# Patient Record
Sex: FEMALE | Race: WHITE | NOT HISPANIC OR LATINO | Employment: OTHER | URBAN - METROPOLITAN AREA
[De-identification: names, ages, dates, MRNs, and addresses within clinical notes are randomized per-mention and may not be internally consistent; named-entity substitution may affect disease eponyms.]

---

## 2023-01-01 ENCOUNTER — PATIENT OUTREACH (OUTPATIENT)
Dept: PRIMARY CARE | Facility: CLINIC | Age: 88
End: 2023-01-01
Payer: MEDICARE

## 2023-01-01 ENCOUNTER — HOSPITAL ENCOUNTER (EMERGENCY)
Facility: HOSPITAL | Age: 88
Discharge: ANOTHER HOSPITAL | End: 2023-11-30
Attending: EMERGENCY MEDICINE
Payer: MEDICARE

## 2023-01-01 ENCOUNTER — HOME VISIT (OUTPATIENT)
Dept: POST ACUTE CARE | Facility: EXTERNAL LOCATION | Age: 88
End: 2023-01-01

## 2023-01-01 ENCOUNTER — APPOINTMENT (OUTPATIENT)
Dept: SURGERY | Facility: CLINIC | Age: 88
End: 2023-01-01
Payer: MEDICARE

## 2023-01-01 ENCOUNTER — CLINICAL SUPPORT (OUTPATIENT)
Dept: SURGERY | Facility: CLINIC | Age: 88
End: 2023-01-01
Payer: MEDICARE

## 2023-01-01 ENCOUNTER — LAB REQUISITION (OUTPATIENT)
Dept: LAB | Facility: HOSPITAL | Age: 88
End: 2023-01-01
Payer: MEDICARE

## 2023-01-01 ENCOUNTER — APPOINTMENT (OUTPATIENT)
Dept: RADIOLOGY | Facility: HOSPITAL | Age: 88
End: 2023-01-01
Payer: MEDICARE

## 2023-01-01 ENCOUNTER — APPOINTMENT (OUTPATIENT)
Dept: RADIOLOGY | Facility: HOSPITAL | Age: 88
DRG: 392 | End: 2023-01-01
Payer: MEDICARE

## 2023-01-01 ENCOUNTER — DOCUMENTATION (OUTPATIENT)
Dept: PRIMARY CARE | Facility: CLINIC | Age: 88
End: 2023-01-01
Payer: MEDICARE

## 2023-01-01 ENCOUNTER — TELEPHONE (OUTPATIENT)
Dept: PRIMARY CARE | Facility: CLINIC | Age: 88
End: 2023-01-01
Payer: MEDICARE

## 2023-01-01 ENCOUNTER — NURSING HOME VISIT (OUTPATIENT)
Dept: POST ACUTE CARE | Facility: EXTERNAL LOCATION | Age: 88
End: 2023-01-01
Payer: MEDICARE

## 2023-01-01 ENCOUNTER — HOME VISIT (OUTPATIENT)
Dept: POST ACUTE CARE | Facility: EXTERNAL LOCATION | Age: 88
End: 2023-01-01
Payer: MEDICARE

## 2023-01-01 ENCOUNTER — HOSPITAL ENCOUNTER (INPATIENT)
Facility: HOSPITAL | Age: 88
LOS: 8 days | Discharge: NURSING, REHAB OR LONG-TERM CARE | DRG: 392 | End: 2023-12-08
Attending: EMERGENCY MEDICINE | Admitting: SURGERY
Payer: MEDICARE

## 2023-01-01 ENCOUNTER — HOME VISIT (OUTPATIENT)
Dept: PRIMARY CARE | Facility: CLINIC | Age: 88
End: 2023-01-01
Payer: MEDICARE

## 2023-01-01 ENCOUNTER — OFFICE VISIT (OUTPATIENT)
Dept: PRIMARY CARE | Facility: CLINIC | Age: 88
End: 2023-01-01
Payer: MEDICARE

## 2023-01-01 VITALS
WEIGHT: 110.1 LBS | HEART RATE: 73 BPM | BODY MASS INDEX: 26.06 KG/M2 | RESPIRATION RATE: 18 BRPM | DIASTOLIC BLOOD PRESSURE: 68 MMHG | SYSTOLIC BLOOD PRESSURE: 146 MMHG

## 2023-01-01 VITALS
SYSTOLIC BLOOD PRESSURE: 130 MMHG | HEART RATE: 59 BPM | TEMPERATURE: 97.2 F | OXYGEN SATURATION: 99 % | DIASTOLIC BLOOD PRESSURE: 47 MMHG | RESPIRATION RATE: 18 BRPM | HEIGHT: 60 IN | WEIGHT: 119.05 LBS | BODY MASS INDEX: 23.37 KG/M2

## 2023-01-01 VITALS
HEIGHT: 59 IN | DIASTOLIC BLOOD PRESSURE: 63 MMHG | OXYGEN SATURATION: 93 % | WEIGHT: 119.05 LBS | SYSTOLIC BLOOD PRESSURE: 126 MMHG | HEART RATE: 86 BPM | TEMPERATURE: 101.1 F | BODY MASS INDEX: 24 KG/M2 | RESPIRATION RATE: 14 BRPM

## 2023-01-01 VITALS
OXYGEN SATURATION: 97 % | TEMPERATURE: 98.3 F | SYSTOLIC BLOOD PRESSURE: 147 MMHG | DIASTOLIC BLOOD PRESSURE: 73 MMHG | HEART RATE: 71 BPM

## 2023-01-01 VITALS
HEIGHT: 55 IN | DIASTOLIC BLOOD PRESSURE: 50 MMHG | OXYGEN SATURATION: 96 % | WEIGHT: 111.6 LBS | SYSTOLIC BLOOD PRESSURE: 130 MMHG | BODY MASS INDEX: 25.83 KG/M2 | HEART RATE: 82 BPM

## 2023-01-01 VITALS
HEART RATE: 73 BPM | SYSTOLIC BLOOD PRESSURE: 132 MMHG | DIASTOLIC BLOOD PRESSURE: 65 MMHG | WEIGHT: 109.4 LBS | BODY MASS INDEX: 25.9 KG/M2 | RESPIRATION RATE: 18 BRPM

## 2023-01-01 VITALS
TEMPERATURE: 98 F | OXYGEN SATURATION: 90 % | SYSTOLIC BLOOD PRESSURE: 119 MMHG | DIASTOLIC BLOOD PRESSURE: 51 MMHG | HEART RATE: 63 BPM | RESPIRATION RATE: 18 BRPM

## 2023-01-01 VITALS — RESPIRATION RATE: 18 BRPM | DIASTOLIC BLOOD PRESSURE: 60 MMHG | HEART RATE: 75 BPM | SYSTOLIC BLOOD PRESSURE: 120 MMHG

## 2023-01-01 VITALS — DIASTOLIC BLOOD PRESSURE: 58 MMHG | SYSTOLIC BLOOD PRESSURE: 114 MMHG | HEART RATE: 74 BPM | RESPIRATION RATE: 18 BRPM

## 2023-01-01 DIAGNOSIS — I25.10 ASCVD (ARTERIOSCLEROTIC CARDIOVASCULAR DISEASE): Chronic | ICD-10-CM

## 2023-01-01 DIAGNOSIS — I10 ESSENTIAL (PRIMARY) HYPERTENSION: ICD-10-CM

## 2023-01-01 DIAGNOSIS — I61.9 NONTRAUMATIC INTRACEREBRAL HEMORRHAGE, UNSPECIFIED CEREBRAL LOCATION, UNSPECIFIED LATERALITY (MULTI): ICD-10-CM

## 2023-01-01 DIAGNOSIS — I61.5 IVH (INTRAVENTRICULAR HEMORRHAGE) (MULTI): ICD-10-CM

## 2023-01-01 DIAGNOSIS — E78.00 HYPERCHOLESTEROLEMIA: ICD-10-CM

## 2023-01-01 DIAGNOSIS — Z71.9 HEALTH EDUCATION/COUNSELING: ICD-10-CM

## 2023-01-01 DIAGNOSIS — F32.1 MODERATE MAJOR DEPRESSION (MULTI): ICD-10-CM

## 2023-01-01 DIAGNOSIS — Z48.03 CHANGE OR REMOVAL OF DRAINS: Primary | ICD-10-CM

## 2023-01-01 DIAGNOSIS — Z00.00 MEDICARE ANNUAL WELLNESS VISIT, SUBSEQUENT: ICD-10-CM

## 2023-01-01 DIAGNOSIS — M06.9 RHEUMATOID ARTHRITIS INVOLVING BOTH KNEES, UNSPECIFIED WHETHER RHEUMATOID FACTOR PRESENT (MULTI): ICD-10-CM

## 2023-01-01 DIAGNOSIS — E78.2 COMBINED HYPERLIPIDEMIA: Chronic | ICD-10-CM

## 2023-01-01 DIAGNOSIS — E03.9 HYPOTHYROIDISM, UNSPECIFIED: ICD-10-CM

## 2023-01-01 DIAGNOSIS — I10 PRIMARY HYPERTENSION: Primary | ICD-10-CM

## 2023-01-01 DIAGNOSIS — M06.9 RHEUMATOID ARTHRITIS INVOLVING BOTH KNEES, UNSPECIFIED WHETHER RHEUMATOID FACTOR PRESENT (MULTI): Primary | ICD-10-CM

## 2023-01-01 DIAGNOSIS — M15.9 PRIMARY OSTEOARTHRITIS INVOLVING MULTIPLE JOINTS: ICD-10-CM

## 2023-01-01 DIAGNOSIS — S32.019A CLOSED FRACTURE OF FIRST LUMBAR VERTEBRA, UNSPECIFIED FRACTURE MORPHOLOGY, INITIAL ENCOUNTER (MULTI): ICD-10-CM

## 2023-01-01 DIAGNOSIS — K57.92 DIVERTICULITIS: ICD-10-CM

## 2023-01-01 DIAGNOSIS — F32.1 DEPRESSION, MAJOR, SINGLE EPISODE, MODERATE (MULTI): ICD-10-CM

## 2023-01-01 DIAGNOSIS — K57.80 DIVERTICULITIS OF INTESTINE WITH ABSCESS WITHOUT BLEEDING, UNSPECIFIED PART OF INTESTINAL TRACT: ICD-10-CM

## 2023-01-01 DIAGNOSIS — K57.92 DIVERTICULITIS: Primary | ICD-10-CM

## 2023-01-01 DIAGNOSIS — I10 ESSENTIAL (PRIMARY) HYPERTENSION: Primary | Chronic | ICD-10-CM

## 2023-01-01 DIAGNOSIS — N39.0 URINARY TRACT INFECTION, SITE NOT SPECIFIED: ICD-10-CM

## 2023-01-01 DIAGNOSIS — K65.1 INTRA-ABDOMINAL ABSCESS (MULTI): ICD-10-CM

## 2023-01-01 DIAGNOSIS — S32.019A CLOSED FRACTURE OF FIRST LUMBAR VERTEBRA, UNSPECIFIED FRACTURE MORPHOLOGY, INITIAL ENCOUNTER (MULTI): Primary | ICD-10-CM

## 2023-01-01 DIAGNOSIS — E78.5 HYPERLIPIDEMIA, UNSPECIFIED: ICD-10-CM

## 2023-01-01 DIAGNOSIS — S62.101A WRIST FRACTURE, CLOSED, RIGHT, INITIAL ENCOUNTER: ICD-10-CM

## 2023-01-01 LAB
25(OH)D3 SERPL-MCNC: 46 NG/ML (ref 30–100)
ABO GROUP (TYPE) IN BLOOD: NORMAL
ALBUMIN SERPL BCP-MCNC: 2.5 G/DL (ref 3.4–5)
ALBUMIN SERPL BCP-MCNC: 2.8 G/DL (ref 3.4–5)
ALBUMIN SERPL BCP-MCNC: 2.8 G/DL (ref 3.4–5)
ALBUMIN SERPL BCP-MCNC: 3 G/DL (ref 3.4–5)
ALBUMIN SERPL BCP-MCNC: 3.4 G/DL (ref 3.4–5)
ALBUMIN SERPL BCP-MCNC: 3.5 G/DL (ref 3.4–5)
ALBUMIN SERPL BCP-MCNC: 4.1 G/DL (ref 3.4–5)
ALP SERPL-CCNC: 116 U/L (ref 33–136)
ALP SERPL-CCNC: 133 U/L (ref 33–136)
ALP SERPL-CCNC: 141 U/L (ref 33–136)
ALT SERPL W P-5'-P-CCNC: 18 U/L (ref 7–45)
ALT SERPL W P-5'-P-CCNC: 19 U/L (ref 7–45)
ALT SERPL W P-5'-P-CCNC: 20 U/L (ref 7–45)
ANION GAP SERPL CALC-SCNC: 13 MMOL/L (ref 10–20)
ANION GAP SERPL CALC-SCNC: 13 MMOL/L (ref 10–20)
ANION GAP SERPL CALC-SCNC: 15 MMOL/L (ref 10–20)
ANION GAP SERPL CALC-SCNC: 16 MMOL/L (ref 10–20)
ANION GAP SERPL CALC-SCNC: 16 MMOL/L (ref 10–20)
ANTIBODY SCREEN: NORMAL
APPEARANCE UR: ABNORMAL
APPEARANCE UR: CLEAR
APTT PPP: 30 SECONDS (ref 27–38)
AST SERPL W P-5'-P-CCNC: 16 U/L (ref 9–39)
AST SERPL W P-5'-P-CCNC: 18 U/L (ref 9–39)
AST SERPL W P-5'-P-CCNC: 20 U/L (ref 9–39)
BACTERIA #/AREA URNS AUTO: ABNORMAL /HPF
BACTERIA #/AREA URNS AUTO: ABNORMAL /HPF
BACTERIA BLD CULT: NORMAL
BACTERIA BLD CULT: NORMAL
BACTERIA FLD CULT: ABNORMAL
BACTERIA UR CULT: NO GROWTH
BACTERIA UR CULT: NORMAL
BASOPHILS # BLD AUTO: 0.03 X10*3/UL (ref 0–0.1)
BASOPHILS # BLD AUTO: 0.03 X10*3/UL (ref 0–0.1)
BASOPHILS # BLD AUTO: 0.04 X10*3/UL (ref 0–0.1)
BASOPHILS NFR BLD AUTO: 0.2 %
BASOPHILS NFR BLD AUTO: 0.3 %
BASOPHILS NFR BLD AUTO: 0.3 %
BILIRUB SERPL-MCNC: 0.3 MG/DL (ref 0–1.2)
BILIRUB SERPL-MCNC: 0.4 MG/DL (ref 0–1.2)
BILIRUB SERPL-MCNC: 0.5 MG/DL (ref 0–1.2)
BILIRUB UR STRIP.AUTO-MCNC: NEGATIVE MG/DL
BILIRUB UR STRIP.AUTO-MCNC: NEGATIVE MG/DL
BUN SERPL-MCNC: 12 MG/DL (ref 6–23)
BUN SERPL-MCNC: 14 MG/DL (ref 6–23)
BUN SERPL-MCNC: 21 MG/DL (ref 6–23)
BUN SERPL-MCNC: 21 MG/DL (ref 6–23)
BUN SERPL-MCNC: 7 MG/DL (ref 6–23)
BUN SERPL-MCNC: 8 MG/DL (ref 6–23)
BUN SERPL-MCNC: 9 MG/DL (ref 6–23)
CALCIUM SERPL-MCNC: 7.7 MG/DL (ref 8.6–10.6)
CALCIUM SERPL-MCNC: 8.2 MG/DL (ref 8.6–10.6)
CALCIUM SERPL-MCNC: 8.6 MG/DL (ref 8.6–10.3)
CALCIUM SERPL-MCNC: 8.6 MG/DL (ref 8.6–10.6)
CALCIUM SERPL-MCNC: 8.7 MG/DL (ref 8.6–10.3)
CALCIUM SERPL-MCNC: 8.8 MG/DL (ref 8.6–10.6)
CALCIUM SERPL-MCNC: 9.2 MG/DL (ref 8.6–10.3)
CHLORIDE SERPL-SCNC: 94 MMOL/L (ref 98–107)
CHLORIDE SERPL-SCNC: 96 MMOL/L (ref 98–107)
CHLORIDE SERPL-SCNC: 97 MMOL/L (ref 98–107)
CHLORIDE SERPL-SCNC: 99 MMOL/L (ref 98–107)
CHOLEST SERPL-MCNC: 265 MG/DL (ref 0–199)
CHOLESTEROL/HDL RATIO: 5.2
CK SERPL-CCNC: 104 U/L (ref 0–215)
CO2 SERPL-SCNC: 28 MMOL/L (ref 21–32)
CO2 SERPL-SCNC: 29 MMOL/L (ref 21–32)
CO2 SERPL-SCNC: 30 MMOL/L (ref 21–32)
CO2 SERPL-SCNC: 32 MMOL/L (ref 21–32)
CO2 SERPL-SCNC: 32 MMOL/L (ref 21–32)
COLOR UR: YELLOW
COLOR UR: YELLOW
CREAT SERPL-MCNC: 0.72 MG/DL (ref 0.5–1.05)
CREAT SERPL-MCNC: 0.72 MG/DL (ref 0.5–1.05)
CREAT SERPL-MCNC: 0.74 MG/DL (ref 0.5–1.05)
CREAT SERPL-MCNC: 0.79 MG/DL (ref 0.5–1.05)
CREAT SERPL-MCNC: 0.79 MG/DL (ref 0.5–1.05)
CREAT SERPL-MCNC: 0.86 MG/DL (ref 0.5–1.05)
CREAT SERPL-MCNC: 0.99 MG/DL (ref 0.5–1.05)
EOSINOPHIL # BLD AUTO: 0.02 X10*3/UL (ref 0–0.4)
EOSINOPHIL # BLD AUTO: 0.04 X10*3/UL (ref 0–0.4)
EOSINOPHIL # BLD AUTO: 0.06 X10*3/UL (ref 0–0.4)
EOSINOPHIL NFR BLD AUTO: 0.2 %
EOSINOPHIL NFR BLD AUTO: 0.3 %
EOSINOPHIL NFR BLD AUTO: 0.7 %
ERYTHROCYTE [DISTWIDTH] IN BLOOD BY AUTOMATED COUNT: 14.1 % (ref 11.5–14.5)
ERYTHROCYTE [DISTWIDTH] IN BLOOD BY AUTOMATED COUNT: 14.2 % (ref 11.5–14.5)
ERYTHROCYTE [DISTWIDTH] IN BLOOD BY AUTOMATED COUNT: 14.3 % (ref 11.5–14.5)
ERYTHROCYTE [DISTWIDTH] IN BLOOD BY AUTOMATED COUNT: 14.3 % (ref 11.5–14.5)
ERYTHROCYTE [DISTWIDTH] IN BLOOD BY AUTOMATED COUNT: 14.5 % (ref 11.5–14.5)
ERYTHROCYTE [DISTWIDTH] IN BLOOD BY AUTOMATED COUNT: 15 % (ref 11.5–14.5)
FLUAV RNA RESP QL NAA+PROBE: NOT DETECTED
FLUBV RNA RESP QL NAA+PROBE: NOT DETECTED
GFR SERPL CREATININE-BSD FRML MDRD: 54 ML/MIN/1.73M*2
GFR SERPL CREATININE-BSD FRML MDRD: 64 ML/MIN/1.73M*2
GFR SERPL CREATININE-BSD FRML MDRD: 71 ML/MIN/1.73M*2
GFR SERPL CREATININE-BSD FRML MDRD: 71 ML/MIN/1.73M*2
GFR SERPL CREATININE-BSD FRML MDRD: 77 ML/MIN/1.73M*2
GFR SERPL CREATININE-BSD FRML MDRD: 80 ML/MIN/1.73M*2
GFR SERPL CREATININE-BSD FRML MDRD: 80 ML/MIN/1.73M*2
GLUCOSE SERPL-MCNC: 108 MG/DL (ref 74–99)
GLUCOSE SERPL-MCNC: 116 MG/DL (ref 74–99)
GLUCOSE SERPL-MCNC: 78 MG/DL (ref 74–99)
GLUCOSE SERPL-MCNC: 85 MG/DL (ref 74–99)
GLUCOSE SERPL-MCNC: 88 MG/DL (ref 74–99)
GLUCOSE SERPL-MCNC: 89 MG/DL (ref 74–99)
GLUCOSE SERPL-MCNC: 99 MG/DL (ref 74–99)
GLUCOSE UR STRIP.AUTO-MCNC: NEGATIVE MG/DL
GLUCOSE UR STRIP.AUTO-MCNC: NEGATIVE MG/DL
GRAM STN SPEC: ABNORMAL
GRAM STN SPEC: ABNORMAL
HCT VFR BLD AUTO: 26.2 % (ref 36–46)
HCT VFR BLD AUTO: 29.8 % (ref 36–46)
HCT VFR BLD AUTO: 29.9 % (ref 36–46)
HCT VFR BLD AUTO: 30.3 % (ref 36–46)
HCT VFR BLD AUTO: 31.6 % (ref 36–46)
HCT VFR BLD AUTO: 31.9 % (ref 36–46)
HCT VFR BLD AUTO: 34.7 % (ref 36–46)
HCT VFR BLD AUTO: 36.8 % (ref 36–46)
HDLC SERPL-MCNC: 50.8 MG/DL
HGB BLD-MCNC: 10.3 G/DL (ref 12–16)
HGB BLD-MCNC: 11.2 G/DL (ref 12–16)
HGB BLD-MCNC: 8 G/DL (ref 12–16)
HGB BLD-MCNC: 9 G/DL (ref 12–16)
HGB BLD-MCNC: 9.2 G/DL (ref 12–16)
HGB BLD-MCNC: 9.4 G/DL (ref 12–16)
HGB BLD-MCNC: 9.6 G/DL (ref 12–16)
HGB BLD-MCNC: 9.9 G/DL (ref 12–16)
HOLD SPECIMEN: NORMAL
HYALINE CASTS #/AREA URNS AUTO: ABNORMAL /LPF
IMM GRANULOCYTES # BLD AUTO: 0.05 X10*3/UL (ref 0–0.5)
IMM GRANULOCYTES # BLD AUTO: 0.07 X10*3/UL (ref 0–0.5)
IMM GRANULOCYTES # BLD AUTO: 0.11 X10*3/UL (ref 0–0.5)
IMM GRANULOCYTES NFR BLD AUTO: 0.6 % (ref 0–0.9)
IMM GRANULOCYTES NFR BLD AUTO: 0.6 % (ref 0–0.9)
IMM GRANULOCYTES NFR BLD AUTO: 0.9 % (ref 0–0.9)
INR PPP: 1.2 (ref 0.9–1.1)
KETONES UR STRIP.AUTO-MCNC: ABNORMAL MG/DL
KETONES UR STRIP.AUTO-MCNC: NEGATIVE MG/DL
LEUKOCYTE ESTERASE UR QL STRIP.AUTO: ABNORMAL
LEUKOCYTE ESTERASE UR QL STRIP.AUTO: ABNORMAL
LYMPHOCYTES # BLD AUTO: 1.14 X10*3/UL (ref 0.8–3)
LYMPHOCYTES # BLD AUTO: 1.24 X10*3/UL (ref 0.8–3)
LYMPHOCYTES # BLD AUTO: 1.4 X10*3/UL (ref 0.8–3)
LYMPHOCYTES NFR BLD AUTO: 10 %
LYMPHOCYTES NFR BLD AUTO: 16.3 %
LYMPHOCYTES NFR BLD AUTO: 9.2 %
MAGNESIUM SERPL-MCNC: 1.75 MG/DL (ref 1.6–2.4)
MAGNESIUM SERPL-MCNC: 1.86 MG/DL (ref 1.6–2.4)
MAGNESIUM SERPL-MCNC: 2.09 MG/DL (ref 1.6–2.4)
MCH RBC QN AUTO: 24.8 PG (ref 26–34)
MCH RBC QN AUTO: 24.9 PG (ref 26–34)
MCH RBC QN AUTO: 25 PG (ref 26–34)
MCH RBC QN AUTO: 25 PG (ref 26–34)
MCH RBC QN AUTO: 25.1 PG (ref 26–34)
MCH RBC QN AUTO: 25.3 PG (ref 26–34)
MCH RBC QN AUTO: 25.4 PG (ref 26–34)
MCH RBC QN AUTO: 25.7 PG (ref 26–34)
MCHC RBC AUTO-ENTMCNC: 29.7 G/DL (ref 32–36)
MCHC RBC AUTO-ENTMCNC: 30.1 G/DL (ref 32–36)
MCHC RBC AUTO-ENTMCNC: 30.2 G/DL (ref 32–36)
MCHC RBC AUTO-ENTMCNC: 30.4 G/DL (ref 32–36)
MCHC RBC AUTO-ENTMCNC: 30.5 G/DL (ref 32–36)
MCHC RBC AUTO-ENTMCNC: 30.8 G/DL (ref 32–36)
MCHC RBC AUTO-ENTMCNC: 31 G/DL (ref 32–36)
MCHC RBC AUTO-ENTMCNC: 31.3 G/DL (ref 32–36)
MCV RBC AUTO: 80 FL (ref 80–100)
MCV RBC AUTO: 82 FL (ref 80–100)
MCV RBC AUTO: 83 FL (ref 80–100)
MCV RBC AUTO: 83 FL (ref 80–100)
MCV RBC AUTO: 84 FL (ref 80–100)
MCV RBC AUTO: 84 FL (ref 80–100)
MONOCYTES # BLD AUTO: 0.83 X10*3/UL (ref 0.05–0.8)
MONOCYTES # BLD AUTO: 1.17 X10*3/UL (ref 0.05–0.8)
MONOCYTES # BLD AUTO: 1.46 X10*3/UL (ref 0.05–0.8)
MONOCYTES NFR BLD AUTO: 11.7 %
MONOCYTES NFR BLD AUTO: 9.4 %
MONOCYTES NFR BLD AUTO: 9.7 %
NEUTROPHILS # BLD AUTO: 6.21 X10*3/UL (ref 1.6–5.5)
NEUTROPHILS # BLD AUTO: 9.55 X10*3/UL (ref 1.6–5.5)
NEUTROPHILS # BLD AUTO: 9.99 X10*3/UL (ref 1.6–5.5)
NEUTROPHILS NFR BLD AUTO: 72.4 %
NEUTROPHILS NFR BLD AUTO: 76.8 %
NEUTROPHILS NFR BLD AUTO: 80.4 %
NITRITE UR QL STRIP.AUTO: NEGATIVE
NITRITE UR QL STRIP.AUTO: NEGATIVE
NON-HDL CHOLESTEROL: 214 MG/DL (ref 0–149)
NRBC BLD-RTO: 0 /100 WBCS (ref 0–0)
PH UR STRIP.AUTO: 5 [PH]
PH UR STRIP.AUTO: 6 [PH]
PHOSPHATE SERPL-MCNC: 2.6 MG/DL (ref 2.5–4.9)
PHOSPHATE SERPL-MCNC: 3 MG/DL (ref 2.5–4.9)
PHOSPHATE SERPL-MCNC: 3.6 MG/DL (ref 2.5–4.9)
PHOSPHATE SERPL-MCNC: 4.1 MG/DL (ref 2.5–4.9)
PLATELET # BLD AUTO: 238 X10*3/UL (ref 150–450)
PLATELET # BLD AUTO: 243 X10*3/UL (ref 150–450)
PLATELET # BLD AUTO: 245 X10*3/UL (ref 150–450)
PLATELET # BLD AUTO: 279 X10*3/UL (ref 150–450)
PLATELET # BLD AUTO: 288 X10*3/UL (ref 150–450)
PLATELET # BLD AUTO: 293 X10*3/UL (ref 150–450)
PLATELET # BLD AUTO: 302 X10*3/UL (ref 150–450)
PLATELET # BLD AUTO: 302 X10*3/UL (ref 150–450)
POTASSIUM SERPL-SCNC: 3.6 MMOL/L (ref 3.5–5.3)
POTASSIUM SERPL-SCNC: 3.6 MMOL/L (ref 3.5–5.3)
POTASSIUM SERPL-SCNC: 3.7 MMOL/L (ref 3.5–5.3)
POTASSIUM SERPL-SCNC: 3.7 MMOL/L (ref 3.5–5.3)
POTASSIUM SERPL-SCNC: 3.8 MMOL/L (ref 3.5–5.3)
POTASSIUM SERPL-SCNC: 4.3 MMOL/L (ref 3.5–5.3)
POTASSIUM SERPL-SCNC: 4.7 MMOL/L (ref 3.5–5.3)
PROT SERPL-MCNC: 6.7 G/DL (ref 6.4–8.2)
PROT SERPL-MCNC: 7.2 G/DL (ref 6.4–8.2)
PROT SERPL-MCNC: 7.2 G/DL (ref 6.4–8.2)
PROT UR STRIP.AUTO-MCNC: ABNORMAL MG/DL
PROT UR STRIP.AUTO-MCNC: NEGATIVE MG/DL
PROTHROMBIN TIME: 13.5 SECONDS (ref 9.8–12.8)
RBC # BLD AUTO: 3.2 X10*6/UL (ref 4–5.2)
RBC # BLD AUTO: 3.6 X10*6/UL (ref 4–5.2)
RBC # BLD AUTO: 3.64 X10*6/UL (ref 4–5.2)
RBC # BLD AUTO: 3.7 X10*6/UL (ref 4–5.2)
RBC # BLD AUTO: 3.86 X10*6/UL (ref 4–5.2)
RBC # BLD AUTO: 3.94 X10*6/UL (ref 4–5.2)
RBC # BLD AUTO: 4.15 X10*6/UL (ref 4–5.2)
RBC # BLD AUTO: 4.36 X10*6/UL (ref 4–5.2)
RBC # UR STRIP.AUTO: ABNORMAL /UL
RBC # UR STRIP.AUTO: NEGATIVE /UL
RBC #/AREA URNS AUTO: ABNORMAL /HPF
RBC #/AREA URNS AUTO: ABNORMAL /HPF
RH FACTOR (ANTIGEN D): NORMAL
SARS-COV-2 RNA RESP QL NAA+PROBE: NOT DETECTED
SODIUM SERPL-SCNC: 134 MMOL/L (ref 136–145)
SODIUM SERPL-SCNC: 134 MMOL/L (ref 136–145)
SODIUM SERPL-SCNC: 135 MMOL/L (ref 136–145)
SODIUM SERPL-SCNC: 137 MMOL/L (ref 136–145)
SODIUM SERPL-SCNC: 139 MMOL/L (ref 136–145)
SP GR UR STRIP.AUTO: 1.01
SP GR UR STRIP.AUTO: 1.01
TSH SERPL-ACNC: 6.59 MIU/L (ref 0.44–3.98)
UROBILINOGEN UR STRIP.AUTO-MCNC: <2 MG/DL
UROBILINOGEN UR STRIP.AUTO-MCNC: <2 MG/DL
VIT B12 SERPL-MCNC: 1961 PG/ML (ref 211–911)
WBC # BLD AUTO: 12.4 X10*3/UL (ref 4.4–11.3)
WBC # BLD AUTO: 12.4 X10*3/UL (ref 4.4–11.3)
WBC # BLD AUTO: 5 X10*3/UL (ref 4.4–11.3)
WBC # BLD AUTO: 6.6 X10*3/UL (ref 4.4–11.3)
WBC # BLD AUTO: 6.7 X10*3/UL (ref 4.4–11.3)
WBC # BLD AUTO: 8.6 X10*3/UL (ref 4.4–11.3)
WBC # BLD AUTO: 9.2 X10*3/UL (ref 4.4–11.3)
WBC # BLD AUTO: 9.8 X10*3/UL (ref 4.4–11.3)
WBC #/AREA URNS AUTO: ABNORMAL /HPF
WBC #/AREA URNS AUTO: ABNORMAL /HPF
WBC CLUMPS #/AREA URNS AUTO: ABNORMAL /HPF

## 2023-01-01 PROCEDURE — 2500000002 HC RX 250 W HCPCS SELF ADMINISTERED DRUGS (ALT 637 FOR MEDICARE OP, ALT 636 FOR OP/ED)

## 2023-01-01 PROCEDURE — 85027 COMPLETE CBC AUTOMATED: CPT

## 2023-01-01 PROCEDURE — 99152 MOD SED SAME PHYS/QHP 5/>YRS: CPT

## 2023-01-01 PROCEDURE — 80069 RENAL FUNCTION PANEL: CPT | Performed by: PHYSICIAN ASSISTANT

## 2023-01-01 PROCEDURE — 49406 IMAGE CATH FLUID PERI/RETRO: CPT

## 2023-01-01 PROCEDURE — 71260 CT THORAX DX C+: CPT | Mod: FR

## 2023-01-01 PROCEDURE — 87086 URINE CULTURE/COLONY COUNT: CPT | Mod: OUT,GEALAB | Performed by: FAMILY MEDICINE

## 2023-01-01 PROCEDURE — 99349 HOME/RES VST EST MOD MDM 40: CPT | Performed by: FAMILY MEDICINE

## 2023-01-01 PROCEDURE — 1200000002 HC GENERAL ROOM WITH TELEMETRY DAILY

## 2023-01-01 PROCEDURE — 85025 COMPLETE CBC W/AUTO DIFF WBC: CPT

## 2023-01-01 PROCEDURE — 2500000004 HC RX 250 GENERAL PHARMACY W/ HCPCS (ALT 636 FOR OP/ED)

## 2023-01-01 PROCEDURE — 36415 COLL VENOUS BLD VENIPUNCTURE: CPT

## 2023-01-01 PROCEDURE — 82306 VITAMIN D 25 HYDROXY: CPT

## 2023-01-01 PROCEDURE — 80069 RENAL FUNCTION PANEL: CPT

## 2023-01-01 PROCEDURE — 85027 COMPLETE CBC AUTOMATED: CPT | Mod: OUT | Performed by: FAMILY MEDICINE

## 2023-01-01 PROCEDURE — 99285 EMERGENCY DEPT VISIT HI MDM: CPT | Performed by: EMERGENCY MEDICINE

## 2023-01-01 PROCEDURE — 2500000005 HC RX 250 GENERAL PHARMACY W/O HCPCS

## 2023-01-01 PROCEDURE — 83735 ASSAY OF MAGNESIUM: CPT | Performed by: PHYSICIAN ASSISTANT

## 2023-01-01 PROCEDURE — 82607 VITAMIN B-12: CPT

## 2023-01-01 PROCEDURE — 87636 SARSCOV2 & INF A&B AMP PRB: CPT

## 2023-01-01 PROCEDURE — 87181 SC STD AGAR DILUTION PER AGT: CPT | Performed by: STUDENT IN AN ORGANIZED HEALTH CARE EDUCATION/TRAINING PROGRAM

## 2023-01-01 PROCEDURE — 72128 CT CHEST SPINE W/O DYE: CPT | Mod: RCN,FR

## 2023-01-01 PROCEDURE — 2500000004 HC RX 250 GENERAL PHARMACY W/ HCPCS (ALT 636 FOR OP/ED): Performed by: STUDENT IN AN ORGANIZED HEALTH CARE EDUCATION/TRAINING PROGRAM

## 2023-01-01 PROCEDURE — 99309 SBSQ NF CARE MODERATE MDM 30: CPT | Performed by: FAMILY MEDICINE

## 2023-01-01 PROCEDURE — 3078F DIAST BP <80 MM HG: CPT | Performed by: FAMILY MEDICINE

## 2023-01-01 PROCEDURE — 2500000001 HC RX 250 WO HCPCS SELF ADMINISTERED DRUGS (ALT 637 FOR MEDICARE OP)

## 2023-01-01 PROCEDURE — 72125 CT NECK SPINE W/O DYE: CPT | Performed by: RADIOLOGY

## 2023-01-01 PROCEDURE — 97530 THERAPEUTIC ACTIVITIES: CPT | Mod: GP

## 2023-01-01 PROCEDURE — 85025 COMPLETE CBC W/AUTO DIFF WBC: CPT | Mod: OUT | Performed by: FAMILY MEDICINE

## 2023-01-01 PROCEDURE — 83735 ASSAY OF MAGNESIUM: CPT

## 2023-01-01 PROCEDURE — 97530 THERAPEUTIC ACTIVITIES: CPT | Mod: GO

## 2023-01-01 PROCEDURE — 80053 COMPREHEN METABOLIC PANEL: CPT

## 2023-01-01 PROCEDURE — 82310 ASSAY OF CALCIUM: CPT

## 2023-01-01 PROCEDURE — 87086 URINE CULTURE/COLONY COUNT: CPT | Mod: GEALAB

## 2023-01-01 PROCEDURE — 72100 X-RAY EXAM L-S SPINE 2/3 VWS: CPT | Mod: FR

## 2023-01-01 PROCEDURE — 96361 HYDRATE IV INFUSION ADD-ON: CPT

## 2023-01-01 PROCEDURE — 86850 RBC ANTIBODY SCREEN: CPT

## 2023-01-01 PROCEDURE — 72131 CT LUMBAR SPINE W/O DYE: CPT | Mod: RCN | Performed by: RADIOLOGY

## 2023-01-01 PROCEDURE — 99232 SBSQ HOSP IP/OBS MODERATE 35: CPT

## 2023-01-01 PROCEDURE — 96365 THER/PROPH/DIAG IV INF INIT: CPT | Mod: 59

## 2023-01-01 PROCEDURE — 96365 THER/PROPH/DIAG IV INF INIT: CPT

## 2023-01-01 PROCEDURE — 96372 THER/PROPH/DIAG INJ SC/IM: CPT

## 2023-01-01 PROCEDURE — G0439 PPPS, SUBSEQ VISIT: HCPCS | Performed by: FAMILY MEDICINE

## 2023-01-01 PROCEDURE — 97165 OT EVAL LOW COMPLEX 30 MIN: CPT | Mod: GO

## 2023-01-01 PROCEDURE — 72100 X-RAY EXAM L-S SPINE 2/3 VWS: CPT | Mod: FOREIGN READ | Performed by: RADIOLOGY

## 2023-01-01 PROCEDURE — 70450 CT HEAD/BRAIN W/O DYE: CPT

## 2023-01-01 PROCEDURE — 74177 CT ABD & PELVIS W/CONTRAST: CPT | Mod: FOREIGN READ | Performed by: RADIOLOGY

## 2023-01-01 PROCEDURE — 84443 ASSAY THYROID STIM HORMONE: CPT | Mod: OUT | Performed by: FAMILY MEDICINE

## 2023-01-01 PROCEDURE — 97162 PT EVAL MOD COMPLEX 30 MIN: CPT | Mod: GP | Performed by: PHYSICAL THERAPIST

## 2023-01-01 PROCEDURE — 86901 BLOOD TYPING SEROLOGIC RH(D): CPT

## 2023-01-01 PROCEDURE — 97535 SELF CARE MNGMENT TRAINING: CPT | Mod: GO

## 2023-01-01 PROCEDURE — 70450 CT HEAD/BRAIN W/O DYE: CPT | Performed by: RADIOLOGY

## 2023-01-01 PROCEDURE — 72128 CT CHEST SPINE W/O DYE: CPT | Mod: RCN | Performed by: RADIOLOGY

## 2023-01-01 PROCEDURE — 71260 CT THORAX DX C+: CPT | Mod: FOREIGN READ | Performed by: RADIOLOGY

## 2023-01-01 PROCEDURE — 81001 URINALYSIS AUTO W/SCOPE: CPT

## 2023-01-01 PROCEDURE — 99231 SBSQ HOSP IP/OBS SF/LOW 25: CPT

## 2023-01-01 PROCEDURE — 2500000004 HC RX 250 GENERAL PHARMACY W/ HCPCS (ALT 636 FOR OP/ED): Performed by: RADIOLOGY

## 2023-01-01 PROCEDURE — 99152 MOD SED SAME PHYS/QHP 5/>YRS: CPT | Performed by: RADIOLOGY

## 2023-01-01 PROCEDURE — 82550 ASSAY OF CK (CPK): CPT

## 2023-01-01 PROCEDURE — 1036F TOBACCO NON-USER: CPT | Performed by: FAMILY MEDICINE

## 2023-01-01 PROCEDURE — 99232 SBSQ HOSP IP/OBS MODERATE 35: CPT | Performed by: PHYSICIAN ASSISTANT

## 2023-01-01 PROCEDURE — 97116 GAIT TRAINING THERAPY: CPT | Mod: GP

## 2023-01-01 PROCEDURE — 1157F ADVNC CARE PLAN IN RCRD: CPT | Performed by: FAMILY MEDICINE

## 2023-01-01 PROCEDURE — 72131 CT LUMBAR SPINE W/O DYE: CPT | Mod: RCN

## 2023-01-01 PROCEDURE — 99222 1ST HOSP IP/OBS MODERATE 55: CPT

## 2023-01-01 PROCEDURE — C1769 GUIDE WIRE: HCPCS

## 2023-01-01 PROCEDURE — 85027 COMPLETE CBC AUTOMATED: CPT | Performed by: PHYSICIAN ASSISTANT

## 2023-01-01 PROCEDURE — 81001 URINALYSIS AUTO W/SCOPE: CPT | Mod: OUT | Performed by: FAMILY MEDICINE

## 2023-01-01 PROCEDURE — C1729 CATH, DRAINAGE: HCPCS

## 2023-01-01 PROCEDURE — 80051 ELECTROLYTE PANEL: CPT

## 2023-01-01 PROCEDURE — 97110 THERAPEUTIC EXERCISES: CPT | Mod: GP

## 2023-01-01 PROCEDURE — 2720000007 HC OR 272 NO HCPCS

## 2023-01-01 PROCEDURE — 49406 IMAGE CATH FLUID PERI/RETRO: CPT | Performed by: RADIOLOGY

## 2023-01-01 PROCEDURE — 99233 SBSQ HOSP IP/OBS HIGH 50: CPT

## 2023-01-01 PROCEDURE — 3075F SYST BP GE 130 - 139MM HG: CPT | Performed by: FAMILY MEDICINE

## 2023-01-01 PROCEDURE — 2550000001 HC RX 255 CONTRASTS

## 2023-01-01 PROCEDURE — 99221 1ST HOSP IP/OBS SF/LOW 40: CPT

## 2023-01-01 PROCEDURE — 83718 ASSAY OF LIPOPROTEIN: CPT | Mod: OUT | Performed by: FAMILY MEDICINE

## 2023-01-01 PROCEDURE — 85730 THROMBOPLASTIN TIME PARTIAL: CPT

## 2023-01-01 PROCEDURE — 87040 BLOOD CULTURE FOR BACTERIA: CPT | Mod: GEALAB

## 2023-01-01 PROCEDURE — 1159F MED LIST DOCD IN RCRD: CPT | Performed by: FAMILY MEDICINE

## 2023-01-01 PROCEDURE — 96375 TX/PRO/DX INJ NEW DRUG ADDON: CPT | Mod: 59

## 2023-01-01 PROCEDURE — 0W9J30Z DRAINAGE OF PELVIC CAVITY WITH DRAINAGE DEVICE, PERCUTANEOUS APPROACH: ICD-10-PCS | Performed by: RADIOLOGY

## 2023-01-01 PROCEDURE — 72125 CT NECK SPINE W/O DYE: CPT

## 2023-01-01 PROCEDURE — 99223 1ST HOSP IP/OBS HIGH 75: CPT

## 2023-01-01 PROCEDURE — 85610 PROTHROMBIN TIME: CPT

## 2023-01-01 PROCEDURE — 99214 OFFICE O/P EST MOD 30 MIN: CPT | Performed by: NURSE PRACTITIONER

## 2023-01-01 PROCEDURE — 99214 OFFICE O/P EST MOD 30 MIN: CPT | Performed by: FAMILY MEDICINE

## 2023-01-01 PROCEDURE — 36415 COLL VENOUS BLD VENIPUNCTURE: CPT | Performed by: PHYSICIAN ASSISTANT

## 2023-01-01 PROCEDURE — 80053 COMPREHEN METABOLIC PANEL: CPT | Mod: OUT | Performed by: FAMILY MEDICINE

## 2023-01-01 PROCEDURE — 99238 HOSP IP/OBS DSCHRG MGMT 30/<: CPT | Performed by: SURGERY

## 2023-01-01 PROCEDURE — 1210000001 HC SEMI-PRIVATE ROOM DAILY

## 2023-01-01 PROCEDURE — 2500000004 HC RX 250 GENERAL PHARMACY W/ HCPCS (ALT 636 FOR OP/ED): Performed by: PHYSICIAN ASSISTANT

## 2023-01-01 RX ORDER — OXYCODONE HYDROCHLORIDE 5 MG/1
5 TABLET ORAL EVERY 6 HOURS PRN
Status: DISCONTINUED | OUTPATIENT
Start: 2023-01-01 | End: 2023-01-01

## 2023-01-01 RX ORDER — ACETAMINOPHEN 500 MG
10 TABLET ORAL NIGHTLY
Status: DISCONTINUED | OUTPATIENT
Start: 2023-01-01 | End: 2023-01-01

## 2023-01-01 RX ORDER — ACETAMINOPHEN 500 MG
5 TABLET ORAL NIGHTLY PRN
Status: DISCONTINUED | OUTPATIENT
Start: 2023-01-01 | End: 2023-01-01 | Stop reason: HOSPADM

## 2023-01-01 RX ORDER — TRAZODONE HYDROCHLORIDE 50 MG/1
50 TABLET ORAL NIGHTLY
Status: DISCONTINUED | OUTPATIENT
Start: 2023-01-01 | End: 2023-01-01 | Stop reason: HOSPADM

## 2023-01-01 RX ORDER — MORPHINE SULFATE 2 MG/ML
2 INJECTION, SOLUTION INTRAMUSCULAR; INTRAVENOUS ONCE
Status: COMPLETED | OUTPATIENT
Start: 2023-01-01 | End: 2023-01-01

## 2023-01-01 RX ORDER — LIDOCAINE 560 MG/1
1 PATCH PERCUTANEOUS; TOPICAL; TRANSDERMAL DAILY
Qty: 14 PATCH | Refills: 0 | Status: SHIPPED | OUTPATIENT
Start: 2023-01-01 | End: 2023-01-01

## 2023-01-01 RX ORDER — TRIAMCINOLONE ACETONIDE 1 MG/G
1 CREAM TOPICAL 2 TIMES DAILY PRN
COMMUNITY

## 2023-01-01 RX ORDER — MEMANTINE HYDROCHLORIDE 10 MG/1
10 TABLET ORAL NIGHTLY
Status: DISCONTINUED | OUTPATIENT
Start: 2023-01-01 | End: 2023-01-01 | Stop reason: HOSPADM

## 2023-01-01 RX ORDER — HALOPERIDOL 5 MG/ML
0.5 INJECTION INTRAMUSCULAR EVERY 4 HOURS PRN
Status: DISCONTINUED | OUTPATIENT
Start: 2023-01-01 | End: 2023-01-01

## 2023-01-01 RX ORDER — ONDANSETRON 4 MG/1
4 TABLET, FILM COATED ORAL 2 TIMES DAILY PRN
COMMUNITY

## 2023-01-01 RX ORDER — ACETAMINOPHEN 500 MG
10 TABLET ORAL NIGHTLY
COMMUNITY
End: 2023-01-01 | Stop reason: HOSPADM

## 2023-01-01 RX ORDER — ONDANSETRON 4 MG/1
4 TABLET, ORALLY DISINTEGRATING ORAL EVERY 8 HOURS PRN
Status: DISCONTINUED | OUTPATIENT
Start: 2023-01-01 | End: 2023-01-01

## 2023-01-01 RX ORDER — GABAPENTIN 100 MG/1
100 CAPSULE ORAL NIGHTLY
COMMUNITY

## 2023-01-01 RX ORDER — OXYCODONE HYDROCHLORIDE 5 MG/1
5 TABLET ORAL EVERY 4 HOURS PRN
Status: DISCONTINUED | OUTPATIENT
Start: 2023-01-01 | End: 2023-01-01 | Stop reason: HOSPADM

## 2023-01-01 RX ORDER — ONDANSETRON 4 MG/1
4 TABLET, FILM COATED ORAL EVERY 8 HOURS PRN
Status: DISCONTINUED | OUTPATIENT
Start: 2023-01-01 | End: 2023-01-01 | Stop reason: HOSPADM

## 2023-01-01 RX ORDER — LATANOPROST 50 UG/ML
1 SOLUTION/ DROPS OPHTHALMIC NIGHTLY
Status: DISCONTINUED | OUTPATIENT
Start: 2023-01-01 | End: 2023-01-01 | Stop reason: HOSPADM

## 2023-01-01 RX ORDER — KETOROLAC TROMETHAMINE 15 MG/ML
15 INJECTION, SOLUTION INTRAMUSCULAR; INTRAVENOUS ONCE
Status: COMPLETED | OUTPATIENT
Start: 2023-01-01 | End: 2023-01-01

## 2023-01-01 RX ORDER — AMOXICILLIN 250 MG
2 CAPSULE ORAL 2 TIMES DAILY
Status: DISCONTINUED | OUTPATIENT
Start: 2023-01-01 | End: 2023-01-01 | Stop reason: HOSPADM

## 2023-01-01 RX ORDER — VANCOMYCIN HYDROCHLORIDE 1 G/200ML
1000 INJECTION, SOLUTION INTRAVENOUS ONCE
Status: COMPLETED | OUTPATIENT
Start: 2023-01-01 | End: 2023-01-01

## 2023-01-01 RX ORDER — ASPIRIN 81 MG/1
81 TABLET ORAL 2 TIMES DAILY
COMMUNITY
End: 2023-01-01 | Stop reason: HOSPADM

## 2023-01-01 RX ORDER — FAMOTIDINE 10 MG/1
10 TABLET ORAL NIGHTLY
COMMUNITY

## 2023-01-01 RX ORDER — ACETAMINOPHEN 325 MG/1
650 TABLET ORAL 2 TIMES DAILY
COMMUNITY
End: 2023-01-01 | Stop reason: HOSPADM

## 2023-01-01 RX ORDER — TRAZODONE HYDROCHLORIDE 50 MG/1
50 TABLET ORAL NIGHTLY
COMMUNITY
End: 2023-01-01 | Stop reason: HOSPADM

## 2023-01-01 RX ORDER — ACETAMINOPHEN 325 MG/1
650 TABLET ORAL EVERY 4 HOURS PRN
COMMUNITY
End: 2023-01-01 | Stop reason: HOSPADM

## 2023-01-01 RX ORDER — LORATADINE 10 MG/1
10 TABLET ORAL NIGHTLY
COMMUNITY

## 2023-01-01 RX ORDER — DULOXETIN HYDROCHLORIDE 30 MG/1
30 CAPSULE, DELAYED RELEASE ORAL 2 TIMES DAILY
Status: DISCONTINUED | OUTPATIENT
Start: 2023-01-01 | End: 2023-01-01 | Stop reason: HOSPADM

## 2023-01-01 RX ORDER — LEVOTHYROXINE SODIUM 88 UG/1
88 TABLET ORAL
COMMUNITY

## 2023-01-01 RX ORDER — ONDANSETRON HYDROCHLORIDE 2 MG/ML
4 INJECTION, SOLUTION INTRAVENOUS EVERY 8 HOURS PRN
Status: DISCONTINUED | OUTPATIENT
Start: 2023-01-01 | End: 2023-01-01

## 2023-01-01 RX ORDER — SODIUM CHLORIDE, SODIUM LACTATE, POTASSIUM CHLORIDE, CALCIUM CHLORIDE 600; 310; 30; 20 MG/100ML; MG/100ML; MG/100ML; MG/100ML
50 INJECTION, SOLUTION INTRAVENOUS CONTINUOUS
Status: DISCONTINUED | OUTPATIENT
Start: 2023-01-01 | End: 2023-01-01

## 2023-01-01 RX ORDER — FAMOTIDINE 20 MG/1
10 TABLET, FILM COATED ORAL NIGHTLY
Status: DISCONTINUED | OUTPATIENT
Start: 2023-01-01 | End: 2023-01-01 | Stop reason: HOSPADM

## 2023-01-01 RX ORDER — ADHESIVE BANDAGE
10 BANDAGE TOPICAL
COMMUNITY

## 2023-01-01 RX ORDER — MORPHINE SULFATE 2 MG/ML
INJECTION, SOLUTION INTRAMUSCULAR; INTRAVENOUS
Status: COMPLETED
Start: 2023-01-01 | End: 2023-01-01

## 2023-01-01 RX ORDER — DULOXETIN HYDROCHLORIDE 30 MG/1
30 CAPSULE, DELAYED RELEASE ORAL 2 TIMES DAILY
COMMUNITY

## 2023-01-01 RX ORDER — SODIUM CHLORIDE, SODIUM LACTATE, POTASSIUM CHLORIDE, CALCIUM CHLORIDE 600; 310; 30; 20 MG/100ML; MG/100ML; MG/100ML; MG/100ML
75 INJECTION, SOLUTION INTRAVENOUS CONTINUOUS
Status: DISCONTINUED | OUTPATIENT
Start: 2023-01-01 | End: 2023-01-01

## 2023-01-01 RX ORDER — ENOXAPARIN SODIUM 100 MG/ML
30 INJECTION SUBCUTANEOUS EVERY 12 HOURS
Status: DISCONTINUED | OUTPATIENT
Start: 2023-01-01 | End: 2023-01-01 | Stop reason: HOSPADM

## 2023-01-01 RX ORDER — AMLODIPINE BESYLATE 10 MG/1
10 TABLET ORAL DAILY
COMMUNITY

## 2023-01-01 RX ORDER — AMLODIPINE BESYLATE 10 MG/1
10 TABLET ORAL DAILY
Status: DISCONTINUED | OUTPATIENT
Start: 2023-01-01 | End: 2023-01-01 | Stop reason: HOSPADM

## 2023-01-01 RX ORDER — CIPROFLOXACIN 250 MG/1
250 TABLET, FILM COATED ORAL 2 TIMES DAILY
Qty: 6 TABLET | Refills: 0
Start: 2023-01-01 | End: 2023-01-01

## 2023-01-01 RX ORDER — ONDANSETRON HYDROCHLORIDE 2 MG/ML
INJECTION, SOLUTION INTRAVENOUS
Status: COMPLETED
Start: 2023-01-01 | End: 2023-01-01

## 2023-01-01 RX ORDER — POTASSIUM CHLORIDE 20 MEQ/1
40 TABLET, EXTENDED RELEASE ORAL ONCE
Status: COMPLETED | OUTPATIENT
Start: 2023-01-01 | End: 2023-01-01

## 2023-01-01 RX ORDER — LOPERAMIDE HYDROCHLORIDE 2 MG/1
2 CAPSULE ORAL 4 TIMES DAILY PRN
COMMUNITY

## 2023-01-01 RX ORDER — HYDROCORTISONE 1 %
1 CREAM (GRAM) TOPICAL 2 TIMES DAILY PRN
COMMUNITY

## 2023-01-01 RX ORDER — ONDANSETRON HYDROCHLORIDE 2 MG/ML
4 INJECTION, SOLUTION INTRAVENOUS EVERY 8 HOURS PRN
Status: DISCONTINUED | OUTPATIENT
Start: 2023-01-01 | End: 2023-01-01 | Stop reason: HOSPADM

## 2023-01-01 RX ORDER — GABAPENTIN 300 MG/1
300 CAPSULE ORAL NIGHTLY
Status: DISCONTINUED | OUTPATIENT
Start: 2023-01-01 | End: 2023-01-01 | Stop reason: HOSPADM

## 2023-01-01 RX ORDER — FENTANYL CITRATE 50 UG/ML
INJECTION, SOLUTION INTRAMUSCULAR; INTRAVENOUS
Status: COMPLETED | OUTPATIENT
Start: 2023-01-01 | End: 2023-01-01

## 2023-01-01 RX ORDER — LIDOCAINE 560 MG/1
1 PATCH PERCUTANEOUS; TOPICAL; TRANSDERMAL DAILY
Status: DISCONTINUED | OUTPATIENT
Start: 2023-01-01 | End: 2023-01-01 | Stop reason: HOSPADM

## 2023-01-01 RX ORDER — DEXTROSE, SODIUM CHLORIDE, SODIUM LACTATE, POTASSIUM CHLORIDE, AND CALCIUM CHLORIDE 5; .6; .31; .03; .02 G/100ML; G/100ML; G/100ML; G/100ML; G/100ML
50 INJECTION, SOLUTION INTRAVENOUS CONTINUOUS
Status: DISCONTINUED | OUTPATIENT
Start: 2023-01-01 | End: 2023-01-01

## 2023-01-01 RX ORDER — MIRTAZAPINE 15 MG/1
15 TABLET, FILM COATED ORAL NIGHTLY
Status: DISCONTINUED | OUTPATIENT
Start: 2023-01-01 | End: 2023-01-01 | Stop reason: HOSPADM

## 2023-01-01 RX ORDER — LEVOTHYROXINE SODIUM 88 UG/1
88 TABLET ORAL
Status: DISCONTINUED | OUTPATIENT
Start: 2023-01-01 | End: 2023-01-01 | Stop reason: HOSPADM

## 2023-01-01 RX ORDER — ACETAMINOPHEN 325 MG/1
975 TABLET ORAL EVERY 8 HOURS
Qty: 270 TABLET | Refills: 0 | Status: SHIPPED | OUTPATIENT
Start: 2023-01-01 | End: 2024-01-01

## 2023-01-01 RX ORDER — ASPIRIN 81 MG/1
81 TABLET ORAL 2 TIMES DAILY
Qty: 46 TABLET | Refills: 0 | Status: SHIPPED | OUTPATIENT
Start: 2023-01-01 | End: 2023-01-01

## 2023-01-01 RX ORDER — OXYCODONE HYDROCHLORIDE 5 MG/1
2.5 TABLET ORAL EVERY 6 HOURS PRN
Status: DISCONTINUED | OUTPATIENT
Start: 2023-01-01 | End: 2023-01-01

## 2023-01-01 RX ORDER — LATANOPROST 50 UG/ML
1 SOLUTION/ DROPS OPHTHALMIC NIGHTLY
COMMUNITY

## 2023-01-01 RX ORDER — ONDANSETRON HYDROCHLORIDE 2 MG/ML
4 INJECTION, SOLUTION INTRAVENOUS ONCE
Status: COMPLETED | OUTPATIENT
Start: 2023-01-01 | End: 2023-01-01

## 2023-01-01 RX ORDER — NALOXONE HYDROCHLORIDE 0.4 MG/ML
0.2 INJECTION, SOLUTION INTRAMUSCULAR; INTRAVENOUS; SUBCUTANEOUS EVERY 5 MIN PRN
Status: DISCONTINUED | OUTPATIENT
Start: 2023-01-01 | End: 2023-01-01 | Stop reason: HOSPADM

## 2023-01-01 RX ORDER — MIDAZOLAM HYDROCHLORIDE 1 MG/ML
INJECTION INTRAMUSCULAR; INTRAVENOUS
Status: COMPLETED | OUTPATIENT
Start: 2023-01-01 | End: 2023-01-01

## 2023-01-01 RX ORDER — DONEPEZIL HYDROCHLORIDE 10 MG/1
10 TABLET, FILM COATED ORAL NIGHTLY
COMMUNITY
End: 2024-01-01 | Stop reason: HOSPADM

## 2023-01-01 RX ORDER — DONEPEZIL HYDROCHLORIDE 10 MG/1
10 TABLET, FILM COATED ORAL NIGHTLY
Status: DISCONTINUED | OUTPATIENT
Start: 2023-01-01 | End: 2023-01-01 | Stop reason: HOSPADM

## 2023-01-01 RX ORDER — MIRTAZAPINE 15 MG/1
15 TABLET, FILM COATED ORAL NIGHTLY
Status: ON HOLD | COMMUNITY
End: 2024-01-01 | Stop reason: ALTCHOICE

## 2023-01-01 RX ORDER — MEMANTINE HYDROCHLORIDE 10 MG/1
10 TABLET ORAL NIGHTLY
Status: ON HOLD | COMMUNITY
End: 2024-01-01 | Stop reason: ENTERED-IN-ERROR

## 2023-01-01 RX ORDER — KETOROLAC TROMETHAMINE 15 MG/ML
INJECTION, SOLUTION INTRAMUSCULAR; INTRAVENOUS
Status: COMPLETED
Start: 2023-01-01 | End: 2023-01-01

## 2023-01-01 RX ORDER — METRONIDAZOLE 500 MG/1
500 TABLET ORAL 3 TIMES DAILY
Qty: 9 TABLET | Refills: 0 | Status: SHIPPED | OUTPATIENT
Start: 2023-01-01 | End: 2023-01-01

## 2023-01-01 RX ORDER — POLYETHYLENE GLYCOL 3350 17 G/17G
17 POWDER, FOR SOLUTION ORAL EVERY OTHER DAY
COMMUNITY

## 2023-01-01 RX ORDER — ACETAMINOPHEN 325 MG/1
975 TABLET ORAL EVERY 8 HOURS
Status: DISCONTINUED | OUTPATIENT
Start: 2023-01-01 | End: 2023-01-01 | Stop reason: HOSPADM

## 2023-01-01 RX ORDER — OMEPRAZOLE 40 MG/1
40 CAPSULE, DELAYED RELEASE ORAL DAILY
COMMUNITY

## 2023-01-01 RX ORDER — LANOLIN ALCOHOL/MO/W.PET/CERES
400 CREAM (GRAM) TOPICAL ONCE
Status: COMPLETED | OUTPATIENT
Start: 2023-01-01 | End: 2023-01-01

## 2023-01-01 RX ORDER — ASPIRIN 81 MG/1
81 TABLET ORAL 2 TIMES DAILY
Qty: 44 TABLET | Refills: 0
Start: 2023-01-01 | End: 2023-01-01

## 2023-01-01 RX ADMIN — MIRTAZAPINE 15 MG: 15 TABLET, FILM COATED ORAL at 20:31

## 2023-01-01 RX ADMIN — ACETAMINOPHEN 975 MG: 325 TABLET ORAL at 01:46

## 2023-01-01 RX ADMIN — LEVOTHYROXINE SODIUM 88 MCG: 0.09 TABLET ORAL at 08:23

## 2023-01-01 RX ADMIN — SODIUM CHLORIDE, SODIUM LACTATE, POTASSIUM CHLORIDE, AND CALCIUM CHLORIDE 1000 ML: 600; 310; 30; 20 INJECTION, SOLUTION INTRAVENOUS at 16:35

## 2023-01-01 RX ADMIN — ONDANSETRON 4 MG: 2 INJECTION INTRAMUSCULAR; INTRAVENOUS at 14:06

## 2023-01-01 RX ADMIN — AMLODIPINE BESYLATE 10 MG: 10 TABLET ORAL at 08:30

## 2023-01-01 RX ADMIN — MEMANTINE 10 MG: 10 TABLET ORAL at 22:14

## 2023-01-01 RX ADMIN — ENOXAPARIN SODIUM 30 MG: 100 INJECTION SUBCUTANEOUS at 22:31

## 2023-01-01 RX ADMIN — PIPERACILLIN SODIUM AND TAZOBACTAM SODIUM 3.38 G: 3; .375 INJECTION, SOLUTION INTRAVENOUS at 09:26

## 2023-01-01 RX ADMIN — PIPERACILLIN SODIUM AND TAZOBACTAM SODIUM 3.38 G: 3; .375 INJECTION, SOLUTION INTRAVENOUS at 02:02

## 2023-01-01 RX ADMIN — PIPERACILLIN SODIUM AND TAZOBACTAM SODIUM 3.38 G: 3; .375 INJECTION, SOLUTION INTRAVENOUS at 14:05

## 2023-01-01 RX ADMIN — GABAPENTIN 300 MG: 300 CAPSULE ORAL at 20:31

## 2023-01-01 RX ADMIN — LATANOPROST 1 DROP: 50 SOLUTION OPHTHALMIC at 21:00

## 2023-01-01 RX ADMIN — LEVOTHYROXINE SODIUM 88 MCG: 0.09 TABLET ORAL at 08:40

## 2023-01-01 RX ADMIN — PIPERACILLIN SODIUM AND TAZOBACTAM SODIUM 3.38 G: 3; .375 INJECTION, SOLUTION INTRAVENOUS at 03:37

## 2023-01-01 RX ADMIN — DULOXETINE HYDROCHLORIDE 30 MG: 30 CAPSULE, DELAYED RELEASE ORAL at 08:40

## 2023-01-01 RX ADMIN — PIPERACILLIN SODIUM AND TAZOBACTAM SODIUM 3.38 G: 3; .375 INJECTION, SOLUTION INTRAVENOUS at 02:00

## 2023-01-01 RX ADMIN — PIPERACILLIN SODIUM AND TAZOBACTAM SODIUM 3.38 G: 3; .375 INJECTION, SOLUTION INTRAVENOUS at 14:21

## 2023-01-01 RX ADMIN — SENNOSIDES AND DOCUSATE SODIUM 2 TABLET: 8.6; 5 TABLET ORAL at 20:26

## 2023-01-01 RX ADMIN — DONEPEZIL HYDROCHLORIDE 10 MG: 10 TABLET, FILM COATED ORAL at 22:14

## 2023-01-01 RX ADMIN — SENNOSIDES AND DOCUSATE SODIUM 2 TABLET: 8.6; 5 TABLET ORAL at 08:25

## 2023-01-01 RX ADMIN — SODIUM CHLORIDE, SODIUM LACTATE, POTASSIUM CHLORIDE, CALCIUM CHLORIDE AND DEXTROSE MONOHYDRATE 50 ML/HR: 5; 600; 310; 30; 20 INJECTION, SOLUTION INTRAVENOUS at 09:27

## 2023-01-01 RX ADMIN — ACETAMINOPHEN 975 MG: 325 TABLET ORAL at 00:46

## 2023-01-01 RX ADMIN — SODIUM CHLORIDE, SODIUM LACTATE, POTASSIUM CHLORIDE, CALCIUM CHLORIDE AND DEXTROSE MONOHYDRATE 50 ML/HR: 5; 600; 310; 30; 20 INJECTION, SOLUTION INTRAVENOUS at 08:35

## 2023-01-01 RX ADMIN — Medication: at 08:00

## 2023-01-01 RX ADMIN — FAMOTIDINE 10 MG: 20 TABLET, FILM COATED ORAL at 20:45

## 2023-01-01 RX ADMIN — LEVOTHYROXINE SODIUM 88 MCG: 0.09 TABLET ORAL at 08:25

## 2023-01-01 RX ADMIN — MEMANTINE 10 MG: 10 TABLET ORAL at 22:33

## 2023-01-01 RX ADMIN — PIPERACILLIN SODIUM AND TAZOBACTAM SODIUM 3.38 G: 3; .375 INJECTION, SOLUTION INTRAVENOUS at 08:40

## 2023-01-01 RX ADMIN — ACETAMINOPHEN 975 MG: 325 TABLET ORAL at 09:26

## 2023-01-01 RX ADMIN — ENOXAPARIN SODIUM 30 MG: 100 INJECTION SUBCUTANEOUS at 08:19

## 2023-01-01 RX ADMIN — PIPERACILLIN SODIUM AND TAZOBACTAM SODIUM 3.38 G: 3; .375 INJECTION, SOLUTION INTRAVENOUS at 20:25

## 2023-01-01 RX ADMIN — Medication 2 L/MIN: at 08:00

## 2023-01-01 RX ADMIN — FENTANYL CITRATE 50 MCG: 50 INJECTION, SOLUTION INTRAMUSCULAR; INTRAVENOUS at 12:16

## 2023-01-01 RX ADMIN — DONEPEZIL HYDROCHLORIDE 10 MG: 10 TABLET, FILM COATED ORAL at 20:25

## 2023-01-01 RX ADMIN — DULOXETINE HYDROCHLORIDE 30 MG: 30 CAPSULE, DELAYED RELEASE ORAL at 20:06

## 2023-01-01 RX ADMIN — LIDOCAINE 1 PATCH: 4 PATCH TOPICAL at 14:46

## 2023-01-01 RX ADMIN — DULOXETINE HYDROCHLORIDE 30 MG: 30 CAPSULE, DELAYED RELEASE ORAL at 20:31

## 2023-01-01 RX ADMIN — LEVOTHYROXINE SODIUM 88 MCG: 0.09 TABLET ORAL at 06:54

## 2023-01-01 RX ADMIN — LATANOPROST 1 DROP: 50 SOLUTION OPHTHALMIC at 22:14

## 2023-01-01 RX ADMIN — ACETAMINOPHEN 975 MG: 325 TABLET ORAL at 08:28

## 2023-01-01 RX ADMIN — SENNOSIDES AND DOCUSATE SODIUM 2 TABLET: 8.6; 5 TABLET ORAL at 22:17

## 2023-01-01 RX ADMIN — MORPHINE SULFATE 2 MG: 2 INJECTION, SOLUTION INTRAMUSCULAR; INTRAVENOUS at 14:06

## 2023-01-01 RX ADMIN — AMLODIPINE BESYLATE 10 MG: 10 TABLET ORAL at 08:20

## 2023-01-01 RX ADMIN — PIPERACILLIN SODIUM AND TAZOBACTAM SODIUM 3.38 G: 3; .375 INJECTION, SOLUTION INTRAVENOUS at 03:40

## 2023-01-01 RX ADMIN — DONEPEZIL HYDROCHLORIDE 10 MG: 10 TABLET, FILM COATED ORAL at 20:06

## 2023-01-01 RX ADMIN — SODIUM CHLORIDE, POTASSIUM CHLORIDE, SODIUM LACTATE AND CALCIUM CHLORIDE 50 ML/HR: 600; 310; 30; 20 INJECTION, SOLUTION INTRAVENOUS at 22:32

## 2023-01-01 RX ADMIN — SODIUM CHLORIDE, POTASSIUM CHLORIDE, SODIUM LACTATE AND CALCIUM CHLORIDE 75 ML/HR: 600; 310; 30; 20 INJECTION, SOLUTION INTRAVENOUS at 08:26

## 2023-01-01 RX ADMIN — ACETAMINOPHEN 975 MG: 325 TABLET ORAL at 09:30

## 2023-01-01 RX ADMIN — PIPERACILLIN SODIUM AND TAZOBACTAM SODIUM 3.38 G: 3; .375 INJECTION, SOLUTION INTRAVENOUS at 14:46

## 2023-01-01 RX ADMIN — DULOXETINE HYDROCHLORIDE 30 MG: 30 CAPSULE, DELAYED RELEASE ORAL at 08:20

## 2023-01-01 RX ADMIN — AMLODIPINE BESYLATE 10 MG: 10 TABLET ORAL at 08:40

## 2023-01-01 RX ADMIN — PIPERACILLIN SODIUM AND TAZOBACTAM SODIUM 3.38 G: 3; .375 INJECTION, SOLUTION INTRAVENOUS at 20:45

## 2023-01-01 RX ADMIN — SENNOSIDES AND DOCUSATE SODIUM 2 TABLET: 8.6; 5 TABLET ORAL at 08:40

## 2023-01-01 RX ADMIN — MIRTAZAPINE 15 MG: 15 TABLET, FILM COATED ORAL at 22:15

## 2023-01-01 RX ADMIN — MAGNESIUM OXIDE TAB 400 MG (241.3 MG ELEMENTAL MG) 400 MG: 400 (241.3 MG) TAB at 13:52

## 2023-01-01 RX ADMIN — PIPERACILLIN SODIUM AND TAZOBACTAM SODIUM 3.38 G: 3; .375 INJECTION, SOLUTION INTRAVENOUS at 09:51

## 2023-01-01 RX ADMIN — GABAPENTIN 300 MG: 300 CAPSULE ORAL at 20:25

## 2023-01-01 RX ADMIN — FAMOTIDINE 10 MG: 20 TABLET, FILM COATED ORAL at 20:05

## 2023-01-01 RX ADMIN — PIPERACILLIN SODIUM AND TAZOBACTAM SODIUM 3.38 G: 3; .375 INJECTION, SOLUTION INTRAVENOUS at 08:24

## 2023-01-01 RX ADMIN — KETOROLAC TROMETHAMINE 15 MG: 15 INJECTION, SOLUTION INTRAMUSCULAR; INTRAVENOUS at 14:25

## 2023-01-01 RX ADMIN — POTASSIUM CHLORIDE 40 MEQ: 1500 TABLET, EXTENDED RELEASE ORAL at 13:52

## 2023-01-01 RX ADMIN — DULOXETINE HYDROCHLORIDE 30 MG: 30 CAPSULE, DELAYED RELEASE ORAL at 09:26

## 2023-01-01 RX ADMIN — SENNOSIDES AND DOCUSATE SODIUM 2 TABLET: 8.6; 5 TABLET ORAL at 08:30

## 2023-01-01 RX ADMIN — MEMANTINE 10 MG: 10 TABLET ORAL at 20:25

## 2023-01-01 RX ADMIN — ENOXAPARIN SODIUM 30 MG: 100 INJECTION SUBCUTANEOUS at 08:30

## 2023-01-01 RX ADMIN — ACETAMINOPHEN 975 MG: 325 TABLET ORAL at 08:30

## 2023-01-01 RX ADMIN — ACETAMINOPHEN 975 MG: 325 TABLET ORAL at 17:33

## 2023-01-01 RX ADMIN — MIRTAZAPINE 15 MG: 15 TABLET, FILM COATED ORAL at 20:25

## 2023-01-01 RX ADMIN — MEMANTINE 10 MG: 10 TABLET ORAL at 21:32

## 2023-01-01 RX ADMIN — PIPERACILLIN SODIUM AND TAZOBACTAM SODIUM 3.38 G: 3; .375 INJECTION, SOLUTION INTRAVENOUS at 14:06

## 2023-01-01 RX ADMIN — LATANOPROST 1 DROP: 50 SOLUTION OPHTHALMIC at 21:31

## 2023-01-01 RX ADMIN — AMLODIPINE BESYLATE 10 MG: 10 TABLET ORAL at 09:26

## 2023-01-01 RX ADMIN — LATANOPROST 1 DROP: 50 SOLUTION OPHTHALMIC at 20:39

## 2023-01-01 RX ADMIN — PIPERACILLIN SODIUM AND TAZOBACTAM SODIUM 3.38 G: 3; .375 INJECTION, SOLUTION INTRAVENOUS at 01:58

## 2023-01-01 RX ADMIN — GABAPENTIN 300 MG: 300 CAPSULE ORAL at 20:05

## 2023-01-01 RX ADMIN — AMLODIPINE BESYLATE 10 MG: 10 TABLET ORAL at 08:23

## 2023-01-01 RX ADMIN — MIRTAZAPINE 15 MG: 15 TABLET, FILM COATED ORAL at 21:00

## 2023-01-01 RX ADMIN — DULOXETINE HYDROCHLORIDE 30 MG: 30 CAPSULE, DELAYED RELEASE ORAL at 08:25

## 2023-01-01 RX ADMIN — DULOXETINE HYDROCHLORIDE 30 MG: 30 CAPSULE, DELAYED RELEASE ORAL at 20:26

## 2023-01-01 RX ADMIN — ENOXAPARIN SODIUM 30 MG: 100 INJECTION SUBCUTANEOUS at 20:36

## 2023-01-01 RX ADMIN — FAMOTIDINE 10 MG: 20 TABLET, FILM COATED ORAL at 22:15

## 2023-01-01 RX ADMIN — DONEPEZIL HYDROCHLORIDE 10 MG: 10 TABLET, FILM COATED ORAL at 20:31

## 2023-01-01 RX ADMIN — IOHEXOL 75 ML: 350 INJECTION, SOLUTION INTRAVENOUS at 11:31

## 2023-01-01 RX ADMIN — DULOXETINE HYDROCHLORIDE 30 MG: 30 CAPSULE, DELAYED RELEASE ORAL at 08:23

## 2023-01-01 RX ADMIN — MIRTAZAPINE 15 MG: 15 TABLET, FILM COATED ORAL at 21:32

## 2023-01-01 RX ADMIN — OXYCODONE HYDROCHLORIDE 5 MG: 5 TABLET ORAL at 16:23

## 2023-01-01 RX ADMIN — ENOXAPARIN SODIUM 30 MG: 100 INJECTION SUBCUTANEOUS at 09:26

## 2023-01-01 RX ADMIN — LEVOTHYROXINE SODIUM 88 MCG: 0.09 TABLET ORAL at 08:43

## 2023-01-01 RX ADMIN — AMLODIPINE BESYLATE 10 MG: 10 TABLET ORAL at 09:52

## 2023-01-01 RX ADMIN — LATANOPROST 1 DROP: 50 SOLUTION OPHTHALMIC at 22:35

## 2023-01-01 RX ADMIN — Medication 5 MG: at 22:15

## 2023-01-01 RX ADMIN — ENOXAPARIN SODIUM 30 MG: 100 INJECTION SUBCUTANEOUS at 20:46

## 2023-01-01 RX ADMIN — Medication 10 MG: at 20:31

## 2023-01-01 RX ADMIN — ENOXAPARIN SODIUM 30 MG: 100 INJECTION SUBCUTANEOUS at 09:48

## 2023-01-01 RX ADMIN — LEVOTHYROXINE SODIUM 88 MCG: 0.09 TABLET ORAL at 08:19

## 2023-01-01 RX ADMIN — SODIUM CHLORIDE, POTASSIUM CHLORIDE, SODIUM LACTATE AND CALCIUM CHLORIDE 75 ML/HR: 600; 310; 30; 20 INJECTION, SOLUTION INTRAVENOUS at 01:47

## 2023-01-01 RX ADMIN — KETOROLAC TROMETHAMINE 15 MG: 15 INJECTION, SOLUTION INTRAMUSCULAR; INTRAVENOUS at 17:09

## 2023-01-01 RX ADMIN — ACETAMINOPHEN 975 MG: 325 TABLET ORAL at 18:26

## 2023-01-01 RX ADMIN — MIRTAZAPINE 15 MG: 15 TABLET, FILM COATED ORAL at 20:05

## 2023-01-01 RX ADMIN — Medication 10 MG: at 20:05

## 2023-01-01 RX ADMIN — PIPERACILLIN SODIUM AND TAZOBACTAM SODIUM 3.38 G: 3; .375 INJECTION, SOLUTION INTRAVENOUS at 08:30

## 2023-01-01 RX ADMIN — FAMOTIDINE 10 MG: 20 TABLET, FILM COATED ORAL at 20:25

## 2023-01-01 RX ADMIN — LIDOCAINE 1 PATCH: 4 PATCH TOPICAL at 13:52

## 2023-01-01 RX ADMIN — PIPERACILLIN SODIUM AND TAZOBACTAM SODIUM 3.38 G: 3; .375 INJECTION, SOLUTION INTRAVENOUS at 22:27

## 2023-01-01 RX ADMIN — PIPERACILLIN SODIUM AND TAZOBACTAM SODIUM 3.38 G: 3; .375 INJECTION, SOLUTION INTRAVENOUS at 16:30

## 2023-01-01 RX ADMIN — PIPERACILLIN SODIUM AND TAZOBACTAM SODIUM 3.38 G: 3; .375 INJECTION, SOLUTION INTRAVENOUS at 20:31

## 2023-01-01 RX ADMIN — DONEPEZIL HYDROCHLORIDE 10 MG: 10 TABLET, FILM COATED ORAL at 21:32

## 2023-01-01 RX ADMIN — PIPERACILLIN SODIUM AND TAZOBACTAM SODIUM 3.38 G: 3; .375 INJECTION, SOLUTION INTRAVENOUS at 02:41

## 2023-01-01 RX ADMIN — ONDANSETRON HYDROCHLORIDE 4 MG: 2 INJECTION, SOLUTION INTRAVENOUS at 14:06

## 2023-01-01 RX ADMIN — MEMANTINE 10 MG: 10 TABLET ORAL at 20:26

## 2023-01-01 RX ADMIN — DONEPEZIL HYDROCHLORIDE 10 MG: 10 TABLET, FILM COATED ORAL at 20:45

## 2023-01-01 RX ADMIN — LATANOPROST 1 DROP: 50 SOLUTION OPHTHALMIC at 20:25

## 2023-01-01 RX ADMIN — PIPERACILLIN SODIUM AND TAZOBACTAM SODIUM 3.38 G: 3; .375 INJECTION, SOLUTION INTRAVENOUS at 03:23

## 2023-01-01 RX ADMIN — SODIUM CHLORIDE, POTASSIUM CHLORIDE, SODIUM LACTATE AND CALCIUM CHLORIDE 500 ML: 600; 310; 30; 20 INJECTION, SOLUTION INTRAVENOUS at 10:12

## 2023-01-01 RX ADMIN — AMLODIPINE BESYLATE 10 MG: 10 TABLET ORAL at 08:24

## 2023-01-01 RX ADMIN — Medication 10 MG: at 20:25

## 2023-01-01 RX ADMIN — DULOXETINE HYDROCHLORIDE 30 MG: 30 CAPSULE, DELAYED RELEASE ORAL at 09:49

## 2023-01-01 RX ADMIN — Medication: at 01:30

## 2023-01-01 RX ADMIN — LEVOTHYROXINE SODIUM 88 MCG: 0.09 TABLET ORAL at 09:49

## 2023-01-01 RX ADMIN — ACETAMINOPHEN 975 MG: 325 TABLET ORAL at 02:00

## 2023-01-01 RX ADMIN — SODIUM CHLORIDE, POTASSIUM CHLORIDE, SODIUM LACTATE AND CALCIUM CHLORIDE 50 ML/HR: 600; 310; 30; 20 INJECTION, SOLUTION INTRAVENOUS at 16:25

## 2023-01-01 RX ADMIN — FAMOTIDINE 10 MG: 20 TABLET, FILM COATED ORAL at 20:36

## 2023-01-01 RX ADMIN — GABAPENTIN 300 MG: 300 CAPSULE ORAL at 22:16

## 2023-01-01 RX ADMIN — SODIUM CHLORIDE, POTASSIUM CHLORIDE, SODIUM LACTATE AND CALCIUM CHLORIDE 50 ML/HR: 600; 310; 30; 20 INJECTION, SOLUTION INTRAVENOUS at 01:59

## 2023-01-01 RX ADMIN — PIPERACILLIN SODIUM AND TAZOBACTAM SODIUM 3.38 G: 3; .375 INJECTION, SOLUTION INTRAVENOUS at 21:31

## 2023-01-01 RX ADMIN — PIPERACILLIN SODIUM AND TAZOBACTAM SODIUM 3.38 G: 3; .375 INJECTION, SOLUTION INTRAVENOUS at 17:36

## 2023-01-01 RX ADMIN — GABAPENTIN 300 MG: 300 CAPSULE ORAL at 20:34

## 2023-01-01 RX ADMIN — PIPERACILLIN SODIUM AND TAZOBACTAM SODIUM 3.38 G: 3; .375 INJECTION, SOLUTION INTRAVENOUS at 19:55

## 2023-01-01 RX ADMIN — DULOXETINE HYDROCHLORIDE 30 MG: 30 CAPSULE, DELAYED RELEASE ORAL at 22:15

## 2023-01-01 RX ADMIN — DULOXETINE HYDROCHLORIDE 30 MG: 30 CAPSULE, DELAYED RELEASE ORAL at 20:25

## 2023-01-01 RX ADMIN — MIRTAZAPINE 15 MG: 15 TABLET, FILM COATED ORAL at 20:45

## 2023-01-01 RX ADMIN — SENNOSIDES AND DOCUSATE SODIUM 2 TABLET: 8.6; 5 TABLET ORAL at 20:31

## 2023-01-01 RX ADMIN — MEMANTINE 10 MG: 10 TABLET ORAL at 20:31

## 2023-01-01 RX ADMIN — SENNOSIDES AND DOCUSATE SODIUM 2 TABLET: 8.6; 5 TABLET ORAL at 08:19

## 2023-01-01 RX ADMIN — PIPERACILLIN SODIUM AND TAZOBACTAM SODIUM 3.38 G: 3; .375 INJECTION, SOLUTION INTRAVENOUS at 13:52

## 2023-01-01 RX ADMIN — MEMANTINE 10 MG: 10 TABLET ORAL at 20:45

## 2023-01-01 RX ADMIN — DULOXETINE HYDROCHLORIDE 30 MG: 30 CAPSULE, DELAYED RELEASE ORAL at 21:32

## 2023-01-01 RX ADMIN — ACETAMINOPHEN 975 MG: 325 TABLET ORAL at 08:21

## 2023-01-01 RX ADMIN — ENOXAPARIN SODIUM 30 MG: 100 INJECTION SUBCUTANEOUS at 08:40

## 2023-01-01 RX ADMIN — ACETAMINOPHEN 975 MG: 325 TABLET ORAL at 08:40

## 2023-01-01 RX ADMIN — SODIUM CHLORIDE, POTASSIUM CHLORIDE, SODIUM LACTATE AND CALCIUM CHLORIDE 75 ML/HR: 600; 310; 30; 20 INJECTION, SOLUTION INTRAVENOUS at 00:07

## 2023-01-01 RX ADMIN — AMLODIPINE BESYLATE 10 MG: 10 TABLET ORAL at 08:28

## 2023-01-01 RX ADMIN — DULOXETINE HYDROCHLORIDE 30 MG: 30 CAPSULE, DELAYED RELEASE ORAL at 08:30

## 2023-01-01 RX ADMIN — PIPERACILLIN SODIUM AND TAZOBACTAM SODIUM 3.38 G: 3; .375 INJECTION, SOLUTION INTRAVENOUS at 08:23

## 2023-01-01 RX ADMIN — PIPERACILLIN SODIUM AND TAZOBACTAM SODIUM 3.38 G: 3; .375 INJECTION, SOLUTION INTRAVENOUS at 22:33

## 2023-01-01 RX ADMIN — LIDOCAINE 1 PATCH: 4 PATCH TOPICAL at 16:21

## 2023-01-01 RX ADMIN — FAMOTIDINE 10 MG: 20 TABLET, FILM COATED ORAL at 20:31

## 2023-01-01 RX ADMIN — PIPERACILLIN SODIUM AND TAZOBACTAM SODIUM 3.38 G: 3; .375 INJECTION, SOLUTION INTRAVENOUS at 08:27

## 2023-01-01 RX ADMIN — DULOXETINE HYDROCHLORIDE 30 MG: 30 CAPSULE, DELAYED RELEASE ORAL at 08:27

## 2023-01-01 RX ADMIN — MIDAZOLAM HYDROCHLORIDE 1 MG: 1 INJECTION, SOLUTION INTRAMUSCULAR; INTRAVENOUS at 12:16

## 2023-01-01 RX ADMIN — PIPERACILLIN SODIUM AND TAZOBACTAM SODIUM 3.38 G: 3; .375 INJECTION, SOLUTION INTRAVENOUS at 01:46

## 2023-01-01 RX ADMIN — VANCOMYCIN HYDROCHLORIDE 1000 MG: 1 INJECTION, SOLUTION INTRAVENOUS at 14:29

## 2023-01-01 RX ADMIN — LATANOPROST 1 DROP: 50 SOLUTION OPHTHALMIC at 20:45

## 2023-01-01 RX ADMIN — FAMOTIDINE 10 MG: 20 TABLET, FILM COATED ORAL at 21:32

## 2023-01-01 RX ADMIN — PIPERACILLIN SODIUM AND TAZOBACTAM SODIUM 3.38 G: 3; .375 INJECTION, SOLUTION INTRAVENOUS at 16:57

## 2023-01-01 SDOH — SOCIAL STABILITY: SOCIAL INSECURITY: HAS ANYONE EVER THREATENED TO HURT YOUR FAMILY OR YOUR PETS?: UNABLE TO ASSESS

## 2023-01-01 SDOH — SOCIAL STABILITY: SOCIAL INSECURITY: ABUSE: ADULT

## 2023-01-01 SDOH — SOCIAL STABILITY: SOCIAL INSECURITY: DO YOU FEEL ANYONE HAS EXPLOITED OR TAKEN ADVANTAGE OF YOU FINANCIALLY OR OF YOUR PERSONAL PROPERTY?: UNABLE TO ASSESS

## 2023-01-01 SDOH — SOCIAL STABILITY: SOCIAL INSECURITY: HAVE YOU HAD THOUGHTS OF HARMING ANYONE ELSE?: NO

## 2023-01-01 SDOH — SOCIAL STABILITY: SOCIAL INSECURITY: ARE THERE ANY APPARENT SIGNS OF INJURIES/BEHAVIORS THAT COULD BE RELATED TO ABUSE/NEGLECT?: UNABLE TO ASSESS

## 2023-01-01 SDOH — SOCIAL STABILITY: SOCIAL INSECURITY: WERE YOU ABLE TO COMPLETE ALL THE BEHAVIORAL HEALTH SCREENINGS?: YES

## 2023-01-01 SDOH — SOCIAL STABILITY: SOCIAL INSECURITY: ARE YOU OR HAVE YOU BEEN THREATENED OR ABUSED PHYSICALLY, EMOTIONALLY, OR SEXUALLY BY ANYONE?: UNABLE TO ASSESS

## 2023-01-01 SDOH — SOCIAL STABILITY: SOCIAL INSECURITY: DOES ANYONE TRY TO KEEP YOU FROM HAVING/CONTACTING OTHER FRIENDS OR DOING THINGS OUTSIDE YOUR HOME?: UNABLE TO ASSESS

## 2023-01-01 SDOH — SOCIAL STABILITY: SOCIAL INSECURITY: WERE YOU ABLE TO COMPLETE ALL THE BEHAVIORAL HEALTH SCREENINGS?: NO

## 2023-01-01 SDOH — SOCIAL STABILITY: SOCIAL INSECURITY: DO YOU FEEL UNSAFE GOING BACK TO THE PLACE WHERE YOU ARE LIVING?: UNABLE TO ASSESS

## 2023-01-01 ASSESSMENT — ENCOUNTER SYMPTOMS
CHILLS: 0
DYSURIA: 1
CONFUSION: 1
NAUSEA: 0
NAUSEA: 0
SHORTNESS OF BREATH: 0
DEPRESSION: 0
ACTIVITY CHANGE: 0
UNEXPECTED WEIGHT CHANGE: 0
DIAPHORESIS: 0
COUGH: 0
UNEXPECTED WEIGHT CHANGE: 0
HEADACHES: 0
FEVER: 1
NAUSEA: 0
NAUSEA: 0
ABDOMINAL PAIN: 1
APPETITE CHANGE: 0
LOSS OF SENSATION IN FEET: 0
APPETITE CHANGE: 0
APPETITE CHANGE: 0
ABDOMINAL DISTENTION: 0
APPETITE CHANGE: 0
DIFFICULTY URINATING: 1
OCCASIONAL FEELINGS OF UNSTEADINESS: 1
HEMATURIA: 0
UNEXPECTED WEIGHT CHANGE: 0
DIZZINESS: 0
PALPITATIONS: 0
EYE ITCHING: 0
NAUSEA: 1
FATIGUE: 1
APPETITE CHANGE: 0
APPETITE CHANGE: 1
UNEXPECTED WEIGHT CHANGE: 0
UNEXPECTED WEIGHT CHANGE: 0
EYE DISCHARGE: 0
FLANK PAIN: 0
CHEST TIGHTNESS: 0
ARTHRALGIAS: 1
NAUSEA: 0

## 2023-01-01 ASSESSMENT — PAIN SCALES - GENERAL
PAINLEVEL_OUTOF10: 0 - NO PAIN
PAINLEVEL_OUTOF10: 0 - NO PAIN
PAINLEVEL_OUTOF10: 4
PAINLEVEL_OUTOF10: 0 - NO PAIN
PAINLEVEL: 0-NO PAIN
PAINLEVEL_OUTOF10: 0 - NO PAIN
PAINLEVEL_OUTOF10: 10 - WORST POSSIBLE PAIN
PAINLEVEL_OUTOF10: 0 - NO PAIN

## 2023-01-01 ASSESSMENT — COGNITIVE AND FUNCTIONAL STATUS - GENERAL
TOILETING: A LOT
MOVING FROM LYING ON BACK TO SITTING ON SIDE OF FLAT BED WITH BEDRAILS: A LOT
MOVING TO AND FROM BED TO CHAIR: A LITTLE
MOBILITY SCORE: 13
STANDING UP FROM CHAIR USING ARMS: A LITTLE
MOVING FROM LYING ON BACK TO SITTING ON SIDE OF FLAT BED WITH BEDRAILS: A LOT
PERSONAL GROOMING: A LITTLE
WALKING IN HOSPITAL ROOM: A LOT
DAILY ACTIVITIY SCORE: 14
DRESSING REGULAR LOWER BODY CLOTHING: A LOT
MOBILITY SCORE: 12
CLIMB 3 TO 5 STEPS WITH RAILING: TOTAL
TURNING FROM BACK TO SIDE WHILE IN FLAT BAD: A LOT
EATING MEALS: A LITTLE
DAILY ACTIVITIY SCORE: 13
PERSONAL GROOMING: A LOT
MOBILITY SCORE: 12
STANDING UP FROM CHAIR USING ARMS: A LOT
EATING MEALS: A LOT
DAILY ACTIVITIY SCORE: 12
DRESSING REGULAR UPPER BODY CLOTHING: A LOT
HELP NEEDED FOR BATHING: A LOT
CLIMB 3 TO 5 STEPS WITH RAILING: A LOT
WALKING IN HOSPITAL ROOM: A LOT
HELP NEEDED FOR BATHING: A LOT
EATING MEALS: A LITTLE
MOVING FROM LYING ON BACK TO SITTING ON SIDE OF FLAT BED WITH BEDRAILS: A LOT
CLIMB 3 TO 5 STEPS WITH RAILING: TOTAL
STANDING UP FROM CHAIR USING ARMS: A LITTLE
DRESSING REGULAR UPPER BODY CLOTHING: A LOT
EATING MEALS: A LITTLE
MOVING TO AND FROM BED TO CHAIR: A LOT
CLIMB 3 TO 5 STEPS WITH RAILING: A LOT
DRESSING REGULAR LOWER BODY CLOTHING: A LOT
MOVING TO AND FROM BED TO CHAIR: A LITTLE
MOBILITY SCORE: 15
TURNING FROM BACK TO SIDE WHILE IN FLAT BAD: A LITTLE
TOILETING: A LOT
TOILETING: A LOT
HELP NEEDED FOR BATHING: A LOT
MOBILITY SCORE: 15
HELP NEEDED FOR BATHING: A LOT
MOVING FROM LYING ON BACK TO SITTING ON SIDE OF FLAT BED WITH BEDRAILS: A LOT
MOVING TO AND FROM BED TO CHAIR: A LITTLE
TURNING FROM BACK TO SIDE WHILE IN FLAT BAD: A LOT
CLIMB 3 TO 5 STEPS WITH RAILING: TOTAL
TURNING FROM BACK TO SIDE WHILE IN FLAT BAD: A LOT
TURNING FROM BACK TO SIDE WHILE IN FLAT BAD: A LITTLE
MOVING TO AND FROM BED TO CHAIR: A LOT
CLIMB 3 TO 5 STEPS WITH RAILING: A LOT
TURNING FROM BACK TO SIDE WHILE IN FLAT BAD: A LOT
MOBILITY SCORE: 12
DAILY ACTIVITIY SCORE: 12
DRESSING REGULAR UPPER BODY CLOTHING: A LITTLE
PATIENT BASELINE BEDBOUND: NO
DRESSING REGULAR LOWER BODY CLOTHING: A LOT
MOVING TO AND FROM BED TO CHAIR: A LOT
DRESSING REGULAR LOWER BODY CLOTHING: A LOT
DRESSING REGULAR LOWER BODY CLOTHING: A LOT
WALKING IN HOSPITAL ROOM: A LITTLE
STANDING UP FROM CHAIR USING ARMS: A LOT
TOILETING: A LITTLE
MOVING FROM LYING ON BACK TO SITTING ON SIDE OF FLAT BED WITH BEDRAILS: A LOT
WALKING IN HOSPITAL ROOM: A LITTLE
WALKING IN HOSPITAL ROOM: A LITTLE
DRESSING REGULAR UPPER BODY CLOTHING: A LOT
CLIMB 3 TO 5 STEPS WITH RAILING: TOTAL
HELP NEEDED FOR BATHING: A LOT
DRESSING REGULAR LOWER BODY CLOTHING: A LOT
MOVING FROM LYING ON BACK TO SITTING ON SIDE OF FLAT BED WITH BEDRAILS: A LITTLE
STANDING UP FROM CHAIR USING ARMS: A LITTLE
MOVING TO AND FROM BED TO CHAIR: A LOT
CLIMB 3 TO 5 STEPS WITH RAILING: TOTAL
DRESSING REGULAR UPPER BODY CLOTHING: A LOT
PERSONAL GROOMING: A LOT
PERSONAL GROOMING: A LITTLE
MOVING TO AND FROM BED TO CHAIR: A LOT
MOBILITY SCORE: 12
TOILETING: A LOT
WALKING IN HOSPITAL ROOM: A LOT
DAILY ACTIVITIY SCORE: 15
PERSONAL GROOMING: A LOT
MOVING FROM LYING ON BACK TO SITTING ON SIDE OF FLAT BED WITH BEDRAILS: A LOT
HELP NEEDED FOR BATHING: A LOT
TURNING FROM BACK TO SIDE WHILE IN FLAT BAD: A LITTLE
EATING MEALS: A LITTLE
HELP NEEDED FOR BATHING: A LOT
MOBILITY SCORE: 14
MOVING TO AND FROM BED TO CHAIR: A LOT
CLIMB 3 TO 5 STEPS WITH RAILING: TOTAL
MOBILITY SCORE: 11
DAILY ACTIVITIY SCORE: 12
MOVING FROM LYING ON BACK TO SITTING ON SIDE OF FLAT BED WITH BEDRAILS: A LOT
TURNING FROM BACK TO SIDE WHILE IN FLAT BAD: A LOT
WALKING IN HOSPITAL ROOM: A LOT
CLIMB 3 TO 5 STEPS WITH RAILING: TOTAL
TURNING FROM BACK TO SIDE WHILE IN FLAT BAD: A LITTLE
MOBILITY SCORE: 15
STANDING UP FROM CHAIR USING ARMS: A LOT
TURNING FROM BACK TO SIDE WHILE IN FLAT BAD: A LITTLE
DRESSING REGULAR UPPER BODY CLOTHING: A LOT
DRESSING REGULAR UPPER BODY CLOTHING: A LOT
TOILETING: A LOT
DRESSING REGULAR LOWER BODY CLOTHING: A LOT
PERSONAL GROOMING: A LITTLE
PERSONAL GROOMING: A LOT
STANDING UP FROM CHAIR USING ARMS: A LITTLE
MOVING FROM LYING ON BACK TO SITTING ON SIDE OF FLAT BED WITH BEDRAILS: A LOT
STANDING UP FROM CHAIR USING ARMS: A LOT
WALKING IN HOSPITAL ROOM: A LOT
TOILETING: TOTAL
STANDING UP FROM CHAIR USING ARMS: A LOT
WALKING IN HOSPITAL ROOM: A LITTLE
MOVING FROM LYING ON BACK TO SITTING ON SIDE OF FLAT BED WITH BEDRAILS: A LOT
DAILY ACTIVITIY SCORE: 16
MOVING TO AND FROM BED TO CHAIR: A LITTLE
STANDING UP FROM CHAIR USING ARMS: A LOT
EATING MEALS: A LOT
WALKING IN HOSPITAL ROOM: A LOT

## 2023-01-01 ASSESSMENT — PAIN - FUNCTIONAL ASSESSMENT

## 2023-01-01 ASSESSMENT — LIFESTYLE VARIABLES
AUDIT-C TOTAL SCORE: 0
HAVE PEOPLE ANNOYED YOU BY CRITICIZING YOUR DRINKING: NO
EVER FELT BAD OR GUILTY ABOUT YOUR DRINKING: NO
HOW MANY STANDARD DRINKS CONTAINING ALCOHOL DO YOU HAVE ON A TYPICAL DAY: PATIENT DOES NOT DRINK
EVER HAD A DRINK FIRST THING IN THE MORNING TO STEADY YOUR NERVES TO GET RID OF A HANGOVER: NO
SKIP TO QUESTIONS 9-10: 1
EVER FELT BAD OR GUILTY ABOUT YOUR DRINKING: NO
HOW OFTEN DO YOU HAVE 6 OR MORE DRINKS ON ONE OCCASION: NEVER
REASON UNABLE TO ASSESS: NO
PRESCIPTION_ABUSE_PAST_12_MONTHS: NO
HAVE PEOPLE ANNOYED YOU BY CRITICIZING YOUR DRINKING: NO
HAVE YOU EVER FELT YOU SHOULD CUT DOWN ON YOUR DRINKING: NO
REASON UNABLE TO ASSESS: NO
HAVE YOU EVER FELT YOU SHOULD CUT DOWN ON YOUR DRINKING: NO
EVER HAD A DRINK FIRST THING IN THE MORNING TO STEADY YOUR NERVES TO GET RID OF A HANGOVER: NO
HOW OFTEN DO YOU HAVE A DRINK CONTAINING ALCOHOL: NEVER
AUDIT-C TOTAL SCORE: 0
SUBSTANCE_ABUSE_PAST_12_MONTHS: NO

## 2023-01-01 ASSESSMENT — ACTIVITIES OF DAILY LIVING (ADL)
TAKING_MEDICATION: TOTAL CARE
BATHING: INDEPENDENT
WALKS IN HOME: NEEDS ASSISTANCE
FEEDING YOURSELF: INDEPENDENT
HEARING - LEFT EAR: FUNCTIONAL
JUDGMENT_ADEQUATE_SAFELY_COMPLETE_DAILY_ACTIVITIES: YES
DOING_HOUSEWORK: TOTAL CARE
ADEQUATE_TO_COMPLETE_ADL: YES
ASSISTIVE_DEVICE: WALKER
HOME_MANAGEMENT_TIME_ENTRY: 19
BATHING_ASSISTANCE: MAXIMAL
GROOMING: INDEPENDENT
HOME_MANAGEMENT_TIME_ENTRY: 25
PATIENT'S MEMORY ADEQUATE TO SAFELY COMPLETE DAILY ACTIVITIES?: NO
TOILETING: INDEPENDENT
GROCERY_SHOPPING: TOTAL CARE
DRESSING YOURSELF: INDEPENDENT
DRESSING: NEEDS ASSISTANCE
ADL_ASSISTANCE: NEEDS ASSISTANCE
LACK_OF_TRANSPORTATION: NO
BATHING: NEEDS ASSISTANCE
MANAGING_FINANCES: TOTAL CARE
HEARING - RIGHT EAR: FUNCTIONAL

## 2023-01-01 ASSESSMENT — COLUMBIA-SUICIDE SEVERITY RATING SCALE - C-SSRS
1. IN THE PAST MONTH, HAVE YOU WISHED YOU WERE DEAD OR WISHED YOU COULD GO TO SLEEP AND NOT WAKE UP?: NO
6. HAVE YOU EVER DONE ANYTHING, STARTED TO DO ANYTHING, OR PREPARED TO DO ANYTHING TO END YOUR LIFE?: NO
2. HAVE YOU ACTUALLY HAD ANY THOUGHTS OF KILLING YOURSELF?: NO
1. IN THE PAST MONTH, HAVE YOU WISHED YOU WERE DEAD OR WISHED YOU COULD GO TO SLEEP AND NOT WAKE UP?: NO
6. HAVE YOU EVER DONE ANYTHING, STARTED TO DO ANYTHING, OR PREPARED TO DO ANYTHING TO END YOUR LIFE?: NO
2. HAVE YOU ACTUALLY HAD ANY THOUGHTS OF KILLING YOURSELF?: NO

## 2023-01-01 ASSESSMENT — PAIN SCALES - WONG BAKER: WONGBAKER_NUMERICALRESPONSE: NO HURT

## 2023-01-01 ASSESSMENT — PATIENT HEALTH QUESTIONNAIRE - PHQ9
SUM OF ALL RESPONSES TO PHQ9 QUESTIONS 1 & 2: 0
2. FEELING DOWN, DEPRESSED OR HOPELESS: SEVERAL DAYS
1. LITTLE INTEREST OR PLEASURE IN DOING THINGS: NOT AT ALL
10. IF YOU CHECKED OFF ANY PROBLEMS, HOW DIFFICULT HAVE THESE PROBLEMS MADE IT FOR YOU TO DO YOUR WORK, TAKE CARE OF THINGS AT HOME, OR GET ALONG WITH OTHER PEOPLE: NOT DIFFICULT AT ALL
SUM OF ALL RESPONSES TO PHQ9 QUESTIONS 1 AND 2: 0
1. LITTLE INTEREST OR PLEASURE IN DOING THINGS: NOT AT ALL
2. FEELING DOWN, DEPRESSED OR HOPELESS: SEVERAL DAYS
1. LITTLE INTEREST OR PLEASURE IN DOING THINGS: NOT AT ALL
1. LITTLE INTEREST OR PLEASURE IN DOING THINGS: NOT AT ALL
2. FEELING DOWN, DEPRESSED OR HOPELESS: NOT AT ALL
10. IF YOU CHECKED OFF ANY PROBLEMS, HOW DIFFICULT HAVE THESE PROBLEMS MADE IT FOR YOU TO DO YOUR WORK, TAKE CARE OF THINGS AT HOME, OR GET ALONG WITH OTHER PEOPLE: NOT DIFFICULT AT ALL
2. FEELING DOWN, DEPRESSED OR HOPELESS: NOT AT ALL
SUM OF ALL RESPONSES TO PHQ9 QUESTIONS 1 AND 2: 1
SUM OF ALL RESPONSES TO PHQ9 QUESTIONS 1 AND 2: 1

## 2023-01-01 ASSESSMENT — PAIN DESCRIPTION - LOCATION
LOCATION: ABDOMEN
LOCATION: BACK

## 2023-01-01 ASSESSMENT — PAIN DESCRIPTION - ORIENTATION: ORIENTATION: LEFT;ANTERIOR

## 2023-03-13 NOTE — TELEPHONE ENCOUNTER
MD Beatrice Billingsley, LPN  Caller: Unspecified (Today,  8:39 AM)  Dw son , Luis, who said she's been more irritable, demanding and perhaps manipulative since escitalopram increased from 5 mg to 10 mg po every day, also myrbetriq no help and a lot more expensive than oxybutinin which also was not helpful, plan to dc myrbetriq

## 2023-03-13 NOTE — TELEPHONE ENCOUNTER
Son, Luis Phone# [phone]  He has some concerns about her mental status. Meds have been changed recently. He would like to discuss her behavior with the doctor.   Biopsy Type: H and E

## 2023-04-28 PROBLEM — I61.9 ICH (INTRACEREBRAL HEMORRHAGE) (MULTI): Status: ACTIVE | Noted: 2023-01-01

## 2023-04-28 PROBLEM — E78.00 HYPERCHOLESTEROLEMIA: Status: ACTIVE | Noted: 2023-01-01

## 2023-04-28 PROBLEM — M06.9 ARTHRITIS OR POLYARTHRITIS, RHEUMATOID (MULTI): Status: ACTIVE | Noted: 2023-01-01

## 2023-04-28 PROBLEM — E03.4 HYPOTHYROIDISM DUE TO ACQUIRED ATROPHY OF THYROID: Status: ACTIVE | Noted: 2023-01-01

## 2023-04-28 PROBLEM — I10 HYPERTENSION: Status: ACTIVE | Noted: 2023-01-01

## 2023-04-28 PROBLEM — L50.9 URTICARIA: Status: ACTIVE | Noted: 2023-01-01

## 2023-04-28 PROBLEM — S89.90XA INJURY OF TIBIA: Status: ACTIVE | Noted: 2023-01-01

## 2023-04-28 PROBLEM — S69.91XA INJURY OF RIGHT WRIST: Status: ACTIVE | Noted: 2023-01-01

## 2023-04-28 PROBLEM — I25.10 CAD (CORONARY ARTERY DISEASE): Status: ACTIVE | Noted: 2023-01-01

## 2023-04-28 PROBLEM — S49.92XA SHOULDER INJURY, LEFT, INITIAL ENCOUNTER: Status: ACTIVE | Noted: 2023-01-01

## 2023-04-28 PROBLEM — S39.93XA: Status: ACTIVE | Noted: 2023-01-01

## 2023-04-28 PROBLEM — I61.5 IVH (INTRAVENTRICULAR HEMORRHAGE) (MULTI): Status: ACTIVE | Noted: 2023-01-01

## 2023-04-28 PROBLEM — S69.91XA HAND INJURY, RIGHT, INITIAL ENCOUNTER: Status: ACTIVE | Noted: 2023-01-01

## 2023-04-28 PROBLEM — S62.101A WRIST FRACTURE, CLOSED, RIGHT, INITIAL ENCOUNTER: Status: ACTIVE | Noted: 2023-01-01

## 2023-04-28 PROBLEM — L73.9 FOLLICULITIS: Status: ACTIVE | Noted: 2023-01-01

## 2023-04-28 PROBLEM — D48.5 NEOPLASM OF UNCERTAIN BEHAVIOR OF SKIN: Status: ACTIVE | Noted: 2023-01-01

## 2023-05-01 NOTE — PROGRESS NOTES
"Subjective   Patient ID: Amena Chapa is a 89 y.o. female who presents for cortisone injection (Son said to go get a shot. Unable to review meds, she just takes \"a lot\").    HPI   Patient has hx of stable hypertension, hyperlipidemia.  Pt denies chest pain, shortness of breath and edema.  Patient's current treatment as listed in Rx.  Patient is compliant with treatment and complains of no side effects associated treatment.   Review of Systems   Constitutional:  Negative for appetite change and unexpected weight change.   Eyes:  Negative for visual disturbance.   Gastrointestinal:  Negative for nausea.   Patient has generalized osteoarthritis with diffuse aches and pains, has gotten a cortisone injection once a year in the past and is here for consideration for same    Objective   /50   Pulse 82   Ht 1.384 m (4' 6.5\")   Wt 50.6 kg (111 lb 9.6 oz)   SpO2 96%   BMI 26.42 kg/m²     Physical Exam  HENT:      Head: Normocephalic and atraumatic.      Nose: Nose normal.      Mouth/Throat:      Mouth: Mucous membranes are moist.      Pharynx: No oropharyngeal exudate.   Eyes:      Extraocular Movements: Extraocular movements intact.      Conjunctiva/sclera: Conjunctivae normal.      Pupils: Pupils are equal, round, and reactive to light.   Cardiovascular:      Rate and Rhythm: Normal rate and regular rhythm.   Pulmonary:      Effort: Pulmonary effort is normal.   Abdominal:      General: There is no distension.      Palpations: Abdomen is soft.   Musculoskeletal:      Cervical back: Normal range of motion and neck supple.   Lymphadenopathy:      Cervical: No cervical adenopathy.   Neurological:      General: No focal deficit present.      Mental Status: She is alert.   Psychiatric:         Attention and Perception: Attention normal.         Speech: Speech normal.         Behavior: Behavior is cooperative.     Both ears external auditory canal with moderate cerumen this is sprooned subsequent exam " normal    Assessment/Plan   Diagnoses and all orders for this visit:  Primary hypertension  Hypercholesterolemia  Primary osteoarthritis involving multiple joints  Comments:  Stay active  We will give cortisone injection today  Reviewed labs  Oral consent obtained after discussion of risk benefits under sterile conditions 1 mL of Xylocaine 1% NDC 32287-426-78 lot [id number] expires 6/26 with methylprednisolone NDC [address]-6 Lot AP [id number] expires 8/2024 injected into the left deltoid no complications  Recheck 3 months sooner if any problems arise

## 2023-08-02 NOTE — PROGRESS NOTES
Subjective   Patient ID: Amena Chapa is a 89 y.o. female who is assisted living/ home patient being seen and evaluated for multiple medical problems.    HPI   [facility] has noted weakness difficulty with ambulation, she also has been noted to be quickly fatiguing and is complaining of left lower extremity pain and low back pain  Also complains of restless legs and leg pain at nighttime and she has tried Sinemet which was successful for a while but she says is no longer helping  She appears to be depressed and is not sure why she still alive but no suicidality or homicidality  Review of Systems   Constitutional:  Negative for appetite change and unexpected weight change.   Eyes:  Negative for visual disturbance.   Gastrointestinal:  Negative for nausea.       Objective   /65   Pulse 73   Resp 18   Wt 49.6 kg (109 lb 6.4 oz)   BMI 25.90 kg/m²     Physical Exam  Constitutional:       General: She is not in acute distress.     Appearance: Normal appearance.   HENT:      Head: Normocephalic and atraumatic.   Eyes:      Conjunctiva/sclera: Conjunctivae normal.   Cardiovascular:      Rate and Rhythm: Normal rate and regular rhythm.   Pulmonary:      Effort: Pulmonary effort is normal.      Breath sounds: Normal breath sounds.   Abdominal:      Palpations: Abdomen is soft.   Musculoskeletal:      Cervical back: Neck supple.   Lymphadenopathy:      Cervical: No cervical adenopathy.   Neurological:      Mental Status: She is alert.   Psychiatric:         Mood and Affect: Mood is depressed. Affect is blunt.         Assessment/Plan   Diagnoses and all orders for this visit:  Rheumatoid arthritis involving both knees, unspecified whether rheumatoid factor present (CMS/[facility])  Comments:  We will have physical therapy see patient for gait and strength left lower extremity pain and back pain  Nontraumatic intracerebral hemorrhage, unspecified cerebral location, unspecified  laterality (CMS/HCC)  Comments:  Stable without signs of recurrent hemorrhage  Depression, major, single episode, moderate (CMS/HCC)  Comments:  Start duloxetine for pain and for depression  Patient has also lost weight and we will monitor  DC Sinemet  Aricept started 5 mg at bedtime for 30 days and then 10 mg at bedtime for memory loss  Reviewed labs from 5/4/2023 CMP lipids CBC and a TSH   Goals    None     Recheck 3 months sooner if any problems arise

## 2023-09-13 NOTE — TELEPHONE ENCOUNTER
Luis is requesting a call back regarding the PT. He did not go into detail, just that it involves the Pts care at [facility] and her health and care.

## 2023-09-14 NOTE — TELEPHONE ENCOUNTER
"Spoke with son, Mom is in a lot of pain back and legs, keeping her up at night she has not slept well in quite some time is exhausted and feels her depression is increasing because of it. Has had a few falls recently, but says she is fine, to the point she is crying all the time and \"can't live like this anymore\" Was hoping a strong pain medication could be given and something for sleep just temporarily to get her through this and allow her to sleep  "

## 2023-09-15 NOTE — TELEPHONE ENCOUNTER
Luis notified states Kavitha already discussed with him and they talked about the possibility of having some x-rays done also.

## 2023-11-01 PROBLEM — F32.1 MODERATE MAJOR DEPRESSION (MULTI): Status: ACTIVE | Noted: 2023-01-01

## 2023-11-01 NOTE — PROGRESS NOTES
Subjective   Patient ID: Amena Chapa is a 89 y.o. female who is assisted living/ home patient being seen and evaluated for multiple medical problems.    HPI   Patient's mood is improved and she is more active  No CP SOB edema  Psychiatry is seeing patient  Appetite improved    Review of Systems   Constitutional:  Negative for appetite change and unexpected weight change.   Eyes:  Negative for visual disturbance.   Gastrointestinal:  Negative for nausea.       Objective   /68   Pulse 73   Resp 18   Wt 49.9 kg (110 lb 1.6 oz)   BMI 26.06 kg/m²     Physical Exam  Constitutional:       General: She is not in acute distress.     Appearance: Normal appearance.   HENT:      Head: Normocephalic and atraumatic.   Eyes:      Conjunctiva/sclera: Conjunctivae normal.   Cardiovascular:      Rate and Rhythm: Normal rate and regular rhythm.   Pulmonary:      Effort: Pulmonary effort is normal.      Breath sounds: Normal breath sounds.   Abdominal:      Palpations: Abdomen is soft.   Musculoskeletal:      Cervical back: Neck supple.   Lymphadenopathy:      Cervical: No cervical adenopathy.   Neurological:      Mental Status: She is alert.         Assessment/Plan   Diagnoses and all orders for this visit:  Primary hypertension  Comments:  Treated with fairly good control  Rheumatoid arthritis involving both knees, unspecified whether rheumatoid factor present (CMS/HCC)  Comments:  Treated and controlled  IVH (intraventricular hemorrhage) (CMS/HCC)  Comments:  Patient with some mood disorder following stroke but improved with input from psychiatry  Moderate major depression (CMS/HCC)    Check labs and reviewed labs from May 4, 2023   Goals    None     Recheck 3 months sooner if any issues arise

## 2023-11-29 NOTE — TELEPHONE ENCOUNTER
Luis is requesting a call regarding the [facility] labs. Was informed it could take 24- 48 hours for a response back.

## 2023-11-30 PROBLEM — K57.92 DIVERTICULITIS: Status: ACTIVE | Noted: 2023-01-01

## 2023-11-30 NOTE — PROGRESS NOTES
Pharmacy Medication History Review    Amena Chapa is a 89 y.o. female admitted for No Principal Problem: There is no principal problem currently on the Problem List. Please update the Problem List and refresh.. Pharmacy reviewed the patient's [id number] medications and allergies for accuracy.    The list below reflects the updated PTA list. Comments regarding how patient may be taking medications differently can be found in the Admit Orders Activity  Prior to Admission Medications   Prescriptions Last Dose Informant Patient Reported? Taking?   DULoxetine (Cymbalta) 30 mg DR capsule   Yes No   Sig: Take 1 capsule (30 mg) by mouth 2 times a day. Noon and bedtime Do not crush or chew.   acetaminophen (Tylenol) 325 mg tablet   Yes No   Sig: Take 2 tablets (650 mg) by mouth 2 times a day. lunchtime and bedtime   acetaminophen (Tylenol) 325 mg tablet   Yes No   Sig: Take 2 tablets (650 mg) by mouth every 4 hours if needed for mild pain (1 - 3) or fever (temp greater than 38.0 C).   amLODIPine (Norvasc) 10 mg tablet   Yes No   Sig: Take 1 tablet (10 mg) by mouth once daily. afternoon   aspirin 81 mg EC tablet   Yes No   Sig: Take 1 tablet (81 mg) by mouth 2 times a day. Noon and bedtime   donepezil (Aricept) 10 mg tablet   Yes No   Sig: Take 1 tablet (10 mg) by mouth once daily at bedtime.   famotidine (Pepcid) 10 mg tablet   Yes No   Sig: Take 1 tablet (10 mg) by mouth once daily at bedtime.   gabapentin (Neurontin) 300 mg capsule   Yes No   Sig: Take 1 capsule (300 mg) by mouth once daily at bedtime.   hydrocortisone 1 % cream   Yes No   Sig: Apply 1 Application topically 2 times a day as needed.   latanoprost (Xalatan) 0.005 % ophthalmic solution   Yes No   Sig: Administer 1 drop into both eyes once daily at bedtime.   levothyroxine (Synthroid, Levoxyl) 88 mcg tablet   Yes No   Sig: Take 1 tablet (88 mcg) by mouth once daily in the morning. Take before meals.   loperamide (Imodium) 2 mg capsule   Yes No    Sig: Take 1 capsule (2 mg) by mouth 4 times a day as needed for diarrhea.   loratadine (Claritin) 10 mg tablet   Yes No   Sig: Take 1 tablet (10 mg) by mouth once daily at bedtime.   magnesium hydroxide (Milk of Magnesia) 400 mg/5 mL suspension   Yes No   Sig: Take 10 mL by mouth every other day if needed for constipation.   melatonin 5 mg tablet   Yes No   Sig: Take 2 tablets (10 mg) by mouth once daily at bedtime.   memantine (Namenda) 10 mg tablet   Yes No   Sig: Take 1 tablet (10 mg) by mouth once daily at bedtime.   mirtazapine (Remeron) 15 mg tablet   Yes No   Sig: Take 1 tablet (15 mg) by mouth once daily at bedtime.   omeprazole (PriLOSEC) 40 mg DR capsule   Yes No   Sig: Take 1 capsule (40 mg) by mouth once daily. Afternoon Do not crush or chew.   ondansetron (Zofran) 4 mg tablet   Yes No   Sig: Take 1 tablet (4 mg) by mouth 2 times a day as needed for nausea or vomiting.   polyethylene glycol (Glycolax, Miralax) 17 gram packet   Yes No   Sig: Take 17 g by mouth every other day.   traZODone (Desyrel) 50 mg tablet   Yes No   Sig: Take 1 tablet (50 mg) by mouth once daily at bedtime.   triamcinolone (Kenalog) 0.1 % cream   Yes No   Sig: Apply 1 Application topically 2 times a day as needed. To the left ulnar forearm      Facility-Administered Medications: None        The list below reflects the updated allergy list. Please review each documented allergy for additional clarification and justification.  Allergies  Reviewed by Vidhya Mcgraw RN on 11/30/2023   No Known Allergies       Sources used to complete the med history include medication orders from [facility] - medications current as of 11/30/23 @0755. Paperwork available in patient's physical chart.     Below are additional concerns with the patient's PTA list.  -None    Felisha Sanchez, PharmD, BCPS   Transitions of Care Pharmacist  [facility]  Please reach out via Secure Chat for questions, or if no response  call [phone] or [facility]

## 2023-11-30 NOTE — H&P
[facility]  TRAUMA SERVICE - HISTORY AND PHYSICAL / CONSULT    Patient Name: Amena Chapa  MRN: [id number]  Admit Date: 1130  : 1933  AGE: 89 y.o.   GENDER: female  ==============================================================================  MECHANISM OF INJURY / CHIEF COMPLAINT:   Amena Chapa is a 89 y/o female who presented to [facility] ED via EMS as an inter-facility transfer from [facility] s/p unwitnessed fall with resulting L1 Vert body fx and concerns for pelvic abscess secondary to acute diverticulitis. Per reports patient was found by staff on the floor at her assisted living facility this morning between her bed and bedside table this morning.  Staff is unsure how long she was on the ground floor. Pt. Is amnestic to the event and appears to be confabulating her story.  Patient son is also concerned that patient has been endorsing abdominal pain for a couple weeks, not sure about complaints of N/V/Diarrhea.  Patient's son also endorsing decreased appetite. Pt. Was taken to OSH where imaging revealed acute L1 Vert body fx and concerns for pelvic abscess secondary to diverticulitis. Pt. Was transferred to [facility] for orthospine, ACS, and trauma evaluations.     LOC (yes/no?): Unknown  Anticoagulant / Anti-platelet Rx? (for what dx?): ASA  Referring Facility Name (N/A for scene EMR run): Arrived via EMS as inter-facility transfer from [facility]     A: Airway intact  B: Breathing spontaneously, breath sounds are bilateral and equal. On 3 LNC.   C: Pulses 2+throughout and equal.    D: Pupils equal and reactive, GCS 14 (E4, V4, M6). Moving all 4 extremities  E: Patient exposed and additional injuries noted; Warm blankets placed on patient     Secondary Survey:  NEURO: A&O x2, GCS 14, CN II-XII grossly intact, ANDRES equally, muscle strength 5/5, no gross sensory deficits  HEAD: NC/AT, No lacerations or abrasions, no bony step offs, midface stable.  EENT: PERRL,  EOMI. Pupils 4-2mm b/l. external ear without laceration. Nasal septum midline, no crepitus or septal hematoma. Oral mucosa and tongue without lacerations, teeth in place.   NECK: No cervical spine tenderness or step offs, no lacerations or abrasions, trachea midline. No JVD.  RESPIRATORY/CHEST: No abrasions, contusions, crepitus or tenderness to palpation. Non-labored, equal chest expansion, CTAB, no W/R/R. On 3L NC.  CV: RRR, nml S1 and S2, no M/R/G. Pulses bilateral: 2+ radial, 2+DP, 2+PT,  2+femoral and 2+ carotid. No TTP of chest  ABDOMEN: soft, nontender, nondistended. No scars, abrasions or lacerations.  PELVIS: Stable to compression.  : nml external genitalia, no blood at urethral meatus  RECTAL: no gross blood noted on exam.  BACK/SPINE: Diffuse thoracic midline tenderness, w/o step-offs or deformities. Diffuse lumbar midline tenderness, w/o step-offs, or deformities.  No abrasions, hematomas or lacerations noted.  EXTREMITIES: No edema or cyanosis. Nml ROM w/o pain. No deformities, lacerations or contusions.     INJURIES:   L1 Vert Body Fx   Pelvic Abscess secondary to Acute diverticulitis     OTHER MEDICAL PROBLEMS:  HTN  HLD  RA  GERD  Aortic Stenosis  CAD w/ stent on ASA   Dementia A/O X 2 Baseline    INCIDENTAL FINDINGS:  Hiatal hernia    ==============================================================================  ADMISSION PLAN OF CARE:  -Will obtain CT Recons of T/L spine given Lumbar fxs   -No indication for C-collar    -Ortho spine consulted appreciate recs        - No acute orthopaedic intervention. Admit to Trauma for PT.       - WB: No restrictions       - Pain meds per primary team/ED       - Antibiotics: None from orthopaedic standpoint        - Diet: Okay for diet from orthopaedic standpoint  -Multimodal pain control  -Cont IV Zosyn   -mIVF while npo  -Will consult IR for pelvic abscess       ->  Non-emergent drainage may be performed 12/1 AM, hold AC and npo at midnight  -FU Med Rec,  resume home meds as appropriate  -Replete electrolytes as clinically indicated  -PT/OT when able    Dispo: Admit to RNF.     Patient seen and plan and disposition discussed with attending, Dr. Chambers.    Franko Beauchamp, MISTY-CNP; Adams Galvan PA-C  Trauma, Critical Care, and Acute Care Surgery  Floor: [id number]   TSICU: [id number]    Consultants notified (specialty, provider name, time): Orthopedics     ==============================================================================  PAST MEDICAL HISTORY:   PMH:   Past Medical History:   Diagnosis Date    Hyperlipidemia, unspecified     Dyslipidemia    Nonrheumatic aortic (valve) stenosis     Severe aortic stenosis    Personal history of other diseases of the digestive system     History of gastroesophageal reflux (GERD)    Personal history of other diseases of the musculoskeletal system and connective tissue     Personal history of arthritis     Last menstrual period: N/A     PSH:   Past Surgical History:   Procedure Laterality Date    HYSTERECTOMY  03/14/2014    Hysterectomy    OTHER SURGICAL HISTORY  03/14/2014    Shoulder Surgery Left    OTHER SURGICAL HISTORY  01/29/2021    Coronary artery stent placement    OTHER SURGICAL HISTORY  01/29/2021    Aortic valve replacement     FH: Non contributory   No family history on file.  SOCIAL HISTORY:    Smoking: Denies   Social History     Tobacco Use   Smoking Status Never   Smokeless Tobacco Never       Alcohol:    Social History     Substance and Sexual Activity   Alcohol Use Not Currently    Alcohol/week: 1.0 standard drink of alcohol    Types: 1 Glasses of wine per week       Drug use: Denies     MEDICATIONS: See Med Rec   Prior to Admission medications    Medication Sig Start Date End Date Taking? Authorizing Provider   acetaminophen (Tylenol) 325 mg tablet Take 2 tablets (650 mg) by mouth 2 times a day. lunchtime and bedtime    Historical Provider, MD   acetaminophen (Tylenol) 325 mg tablet Take 2 tablets (650 mg) by  mouth every 4 hours if needed for mild pain (1 - 3) or fever (temp greater than 38.0 C).    Historical Provider, MD   amLODIPine (Norvasc) 10 mg tablet Take 1 tablet (10 mg) by mouth once daily. afternoon    Historical Provider, MD   aspirin 81 mg EC tablet Take 1 tablet (81 mg) by mouth 2 times a day. Noon and bedtime    Historical Provider, MD   donepezil (Aricept) 10 mg tablet Take 1 tablet (10 mg) by mouth once daily at bedtime.    Historical Provider, MD   DULoxetine (Cymbalta) 30 mg DR capsule Take 1 capsule (30 mg) by mouth once daily. Noon and bedtime Do not crush or chew.    Historical Provider, MD   famotidine (Pepcid) 10 mg tablet Take 1 tablet (10 mg) by mouth once daily at bedtime.    Historical Provider, MD   gabapentin (Neurontin) 300 mg capsule Take 1 capsule (300 mg) by mouth once daily at bedtime.    Historical Provider, MD   hydrocortisone 1 % cream Apply 1 Application topically 2 times a day as needed.    Historical Provider, MD   latanoprost (Xalatan) 0.005 % ophthalmic solution Administer 1 drop into both eyes once daily at bedtime.    Historical Provider, MD   levothyroxine (Tirosint) 88 mcg capsule Take 1 capsule (88 mcg) by mouth once daily in the morning. Take before meals.    Historical Provider, MD   loperamide (Imodium) 2 mg capsule Take 1 capsule (2 mg) by mouth 4 times a day as needed for diarrhea.    Historical Provider, MD   loratadine (Claritin) 10 mg tablet Take 1 tablet (10 mg) by mouth once daily at bedtime.    Historical Provider, MD   magnesium hydroxide (Milk of Magnesia) 400 mg/5 mL suspension Take 10 mL by mouth every other day. As needed    Historical Provider, MD   melatonin 10 mg tablet Take 1 tablet (10 mg) by mouth once daily at bedtime.    Historical Provider, MD   memantine (Namenda) 10 mg tablet Take 1 tablet (10 mg) by mouth once daily at bedtime.    Historical Provider, MD   mirtazapine (Remeron) 15 mg tablet Take 1 tablet (15 mg) by mouth once daily at bedtime.     Historical Provider, MD   omeprazole (PriLOSEC) 40 mg DR capsule Take 1 capsule (40 mg) by mouth once daily. Afternoon Do not crush or chew.    Historical Provider, MD   ondansetron (Zofran) 4 mg tablet Take 1 tablet (4 mg) by mouth 2 times a day as needed for nausea or vomiting.    Historical Provider, MD   polyethylene glycol (Glycolax, Miralax) 17 gram packet Take 17 g by mouth every other day.    Historical Provider, MD   traZODone (Desyrel) 50 mg tablet Take 1 tablet (50 mg) by mouth once daily at bedtime.    Historical Provider, MD   triamcinolone (Kenalog) 0.1 % cream Apply 1 Application topically 2 times a day as needed. To the left ulnar forearm    Historical Provider, MD     ALLERGIES:   No Known Allergies    REVIEW OF SYSTEMS:  Review of Systems   Constitutional:  Positive for appetite change, fatigue and fever. Negative for activity change, chills and diaphoresis.   HENT:  Negative for congestion, dental problem, nosebleeds and sneezing.    Eyes:  Negative for discharge and itching.   Respiratory:  Negative for cough, chest tightness and shortness of breath.    Cardiovascular:  Negative for chest pain, palpitations and leg swelling.   Gastrointestinal:  Positive for abdominal pain and nausea. Negative for abdominal distention.   Endocrine: Negative for cold intolerance and heat intolerance.   Genitourinary:  Positive for difficulty urinating and dysuria. Negative for flank pain and hematuria.   Musculoskeletal:  Positive for arthralgias.   Neurological:  Negative for dizziness, syncope and headaches.   Psychiatric/Behavioral:  Positive for confusion.      PHYSICAL EXAM:  PRIMARY SURVEY:  Primary Survey  SECONDARY SURVEY/PHYSICAL EXAM:  Physical Exam  IMAGING SUMMARY:  (summary of findings, not a copy of dictation)  CT Head/Face: Neg  CT C-Spine: Neg  CT Chest/Abd/Pelvis: abscess within the pelvis, wall thickening of left side of the colon  CXR/PXR: not performed  Other(s):   CT T&L: superior endplate  compression of L1 vertebral body, grade 1 anterolisthesis of L4-L5, central canal stenosis of L3-L4 and L4-L5 levels, multilevel bilateral neuroforamina narrowing of lumbar spine, no definite acute thoracic vertebral body compression/fracture    LABS:  Results for orders placed or performed during the hospital encounter of 11/30/23 (from the past 24 hour(s))   Sars-CoV-2 PCR, Symptomatic   Result Value Ref Range    Coronavirus 2019, PCR Not Detected Not Detected   Influenza A, and B PCR   Result Value Ref Range    Flu A Result Not Detected Not Detected    Flu B Result Not Detected Not Detected   CBC and Auto Differential   Result Value Ref Range    WBC 12.4 (H) 4.4 - 11.3 x10*3/uL    nRBC 0.0 0.0 - 0.0 /100 WBCs    RBC 3.94 (L) 4.00 - 5.20 x10*6/uL    Hemoglobin 9.9 (L) 12.0 - 16.0 g/dL    Hematocrit 31.6 (L) 36.0 - 46.0 %    MCV 80 80 - 100 fL    MCH 25.1 (L) 26.0 - 34.0 pg    MCHC 31.3 (L) 32.0 - 36.0 g/dL    RDW 14.1 11.5 - 14.5 %    Platelets 279 150 - 450 x10*3/uL    Neutrophils % 80.4 40.0 - 80.0 %    Immature Granulocytes %, Automated 0.6 0.0 - 0.9 %    Lymphocytes % 9.2 13.0 - 44.0 %    Monocytes % 9.4 2.0 - 10.0 %    Eosinophils % 0.2 0.0 - 6.0 %    Basophils % 0.2 0.0 - 2.0 %    Neutrophils Absolute 9.99 (H) 1.60 - 5.50 x10*3/uL    Immature Granulocytes Absolute, Automated 0.07 0.00 - 0.50 x10*3/uL    Lymphocytes Absolute 1.14 0.80 - 3.00 x10*3/uL    Monocytes Absolute 1.17 (H) 0.05 - 0.80 x10*3/uL    Eosinophils Absolute 0.02 0.00 - 0.40 x10*3/uL    Basophils Absolute 0.03 0.00 - 0.10 x10*3/uL   Comprehensive metabolic panel   Result Value Ref Range    Glucose 108 (H) 74 - 99 mg/dL    Sodium 134 (L) 136 - 145 mmol/L    Potassium 3.6 3.5 - 5.3 mmol/L    Chloride 94 (L) 98 - 107 mmol/L    Bicarbonate 29 21 - 32 mmol/L    Anion Gap 15 10 - 20 mmol/L    Urea Nitrogen 14 6 - 23 mg/dL    Creatinine 0.74 0.50 - 1.05 mg/dL    eGFR 77 >60 mL/min/1.73m*2    Calcium 8.7 8.6 - 10.3 mg/dL    Albumin 3.4 3.4 - 5.0 g/dL     Alkaline Phosphatase 141 (H) 33 - 136 U/L    Total Protein 7.2 6.4 - 8.2 g/dL    AST 18 9 - 39 U/L    Bilirubin, Total 0.5 0.0 - 1.2 mg/dL    ALT 20 7 - 45 U/L   Magnesium   Result Value Ref Range    Magnesium 2.09 1.60 - 2.40 mg/dL   Creatine Kinase   Result Value Ref Range    Creatine Kinase 104 0 - 215 U/L   Urinalysis with Reflex Microscopic and Culture   Result Value Ref Range    Color, Urine Yellow Straw, Yellow    Appearance, Urine Clear Clear    Specific Gravity, Urine 1.013 1.005 - 1.035    pH, Urine 6.0 5.0, 5.5, 6.0, 6.5, 7.0, 7.5, 8.0    Protein, Urine 100 (2+) (N) NEGATIVE mg/dL    Glucose, Urine NEGATIVE NEGATIVE mg/dL    Blood, Urine SMALL (1+) (A) NEGATIVE    Ketones, Urine 5 (TRACE) (A) NEGATIVE mg/dL    Bilirubin, Urine NEGATIVE NEGATIVE    Urobilinogen, Urine <2.0 <2.0 mg/dL    Nitrite, Urine NEGATIVE NEGATIVE    Leukocyte Esterase, Urine TRACE (A) NEGATIVE   Extra Urine Gray Tube   Result Value Ref Range    Extra Tube Hold for add-ons.    Microscopic Only, Urine   Result Value Ref Range    WBC, Urine 6-10 (A) 1-5, NONE /HPF    RBC, Urine 3-5 NONE, 1-2, 3-5 /HPF    Bacteria, Urine 1+ (A) NONE SEEN /HPF    Hyaline Casts, Urine 1+ (A) NONE /LPF       I have reviewed all laboratory and imaging results ordered/pertinent for this encounter.

## 2023-11-30 NOTE — Clinical Note
Procedure complete. VSS. 8 Fr drain sutured in place. 1mg versed and 50 mcg fentanyl given. 15 ml drained. Stay fix in place. Pt back in transport to T8.

## 2023-11-30 NOTE — ED PROVIDER NOTES
HPI   Chief Complaint   Patient presents with    Abdominal Pain    Fall       Patient is a 89-year-old female past medical history of hyperlipidemia, RA, GERD, dementia, hypertension here as a transfer from [facility] emergency department in the setting of a fall while at [facility] today.  Patient found to have an acute L1 vertebral body fracture otherwise neurologically intact, and also found to have acute diverticulitis with possible abscess and UTI for which patient was covered with antibiotics.  Patient notes some left lower quadrant abdominal pain but denies any hematochezia, melena, mental status change, diarrhea, notes she still passing gas.  Was sent here for trauma and ACS evaluation.      History provided by:  EMS personnel   used: No                        [place] Coma Scale Score: 14                  Patient History   Past Medical History:   Diagnosis Date    Hyperlipidemia, unspecified     Dyslipidemia    Nonrheumatic aortic (valve) stenosis     Severe aortic stenosis    Personal history of other diseases of the digestive system     History of gastroesophageal reflux (GERD)    Personal history of other diseases of the musculoskeletal system and connective tissue     Personal history of arthritis     Past Surgical History:   Procedure Laterality Date    HYSTERECTOMY  03/14/2014    Hysterectomy    OTHER SURGICAL HISTORY  03/14/2014    Shoulder Surgery Left    OTHER SURGICAL HISTORY  01/29/2021    Coronary artery stent placement    OTHER SURGICAL HISTORY  01/29/2021    Aortic valve replacement     No family history on file.  Social History     Tobacco Use    Smoking status: Never    Smokeless tobacco: Never   Substance Use Topics    Alcohol use: Not Currently     Alcohol/week: 1.0 standard drink of alcohol     Types: 1 Glasses of wine per week    Drug use: Never       Physical Exam   ED Triage Vitals [11/30/23 1618]   Temp Heart Rate Resp BP   -- 60 16 (!) 91/46      SpO2 Temp  src Heart Rate Source Patient Position   98 % -- -- --      BP Location FiO2 (%)     -- --       Physical Exam  Constitutional:       Appearance: She is well-developed.   HENT:      Head: Normocephalic and atraumatic.      Right Ear: External ear normal.      Left Ear: External ear normal.      Nose: Nose normal. No congestion or rhinorrhea.      Mouth/Throat:      Pharynx: Oropharynx is clear.   Eyes:      Extraocular Movements: Extraocular movements intact.      Conjunctiva/sclera: Conjunctivae normal.      Pupils: Pupils are equal, round, and reactive to light.   Cardiovascular:      Rate and Rhythm: Normal rate and regular rhythm.      Pulses: Normal pulses.      Heart sounds: Normal heart sounds.   Pulmonary:      Effort: Pulmonary effort is normal.      Breath sounds: Normal breath sounds.   Abdominal:      Palpations: Abdomen is soft.      Comments: Moderate left lower quadrant tenderness without rebound or guarding   Musculoskeletal:         General: Normal range of motion.      Cervical back: Normal range of motion and neck supple. No rigidity or tenderness.   Skin:     General: Skin is warm and dry.      Capillary Refill: Capillary refill takes less than 2 seconds.   Neurological:      General: No focal deficit present.      Mental Status: She is alert and oriented to person, place, and time.      Cranial Nerves: No cranial nerve deficit.      Sensory: No sensory deficit.      Motor: No weakness.   Psychiatric:         Mood and Affect: Mood normal.         Behavior: Behavior normal.         ED Course & MDM        Medical Decision Making  Patient is a 89-year-old female here as a transfer from [facility] emergency department in the setting of an acute L1 fracture and acute diverticulitis with an abscess.  Patient presents hypotensive otherwise hemodynamically stable, no acute distress, mentating appropriately, afebrile and saturating well on room air.  Physical exam notable for chronically but acutely  nontoxic-appearing female, she does have tenderness over the left lower quadrant without rebound or guarding consistent with her known diagnosis of diverticulitis.  Patient started on broad-spectrum antibiotics, given blood pressure, fluid resuscitated with improvement in blood pressure.  Trauma surgery and ACS consulted given the after mentioned findings, trauma surgery consulted spine surgery who did not recommend acute operative intervention, patient admitted to the trauma floor service with IV antibiotics, and in spine precautions given the L1 fracture without any neurologic sequelae.  Patient's pain well-controlled in the emergency department without any decompensation in patient's state.    Amount and/or Complexity of Data Reviewed  Independent Historian: EMS  External Data Reviewed: notes.  Labs: ordered.    Risk  Decision regarding hospitalization.        Procedure  Procedures     Lazarus Najera MD  Resident  11/30/23 9360

## 2023-11-30 NOTE — PROGRESS NOTES
Pharmacy Medication History Review    Amena Chapa is a 89 y.o. female admitted for No Principal Problem: There is no principal problem currently on the Problem List. Please update the Problem List and refresh.. Pharmacy reviewed the patient's [id number] medications and allergies for accuracy.    The list below reflectives the updated PTA list. Please review each medication in order reconciliation for additional clarification and justification.  (Not in a hospital admission)       The list below reflectives the updated allergy list. Please review each documented allergy for additional clarification and justification.  Allergies  Reviewed by Paz Sam CPhT on 11/30/2023   No Known Allergies         Below are additional concerns with the patient's PTA list.      Paz Sam CPhT

## 2023-11-30 NOTE — ED PROVIDER NOTES
HPI   Chief Complaint   Patient presents with    Fall     Pt found on the floor in between her bed and bedside table this AM by [facility] staff. Hx of dementia. Pot denies pain on arrival to ED. No blood thinners.        Patient is a 89-year-old female with significant PMH of hyperlipidemia, RA, GERD, dementia, hypertension presents to the ED with cc of fall times today at [facility].  Patient was found by staff on the floor in between her bed and bedside table this morning.  Staff is unsure how long she was on the ground floor.  Patient is denying any pain for me.  Patient has had some medicine dose changes as reported by the son which she is concerned for may be the cause of this.  Patient son is also concerned that patient has been endorsing abdominal pain for a couple weeks and would like imaging.  Patient's son states patient has had decreased appetite.  Patient son states patient had a slight fever today here 99.5.  Patient denies any cough, rhinorrhea, congestion, chest pain, shortness of breath, nausea vomiting, diarrhea, constipation.  Patient does states she has abdominal pain.  Patient states this is not new.  Patient denies any neck pain or back pain.  Patient ROS is limited due to dementia.  Most of history obtained by the son.  Patient denies any tobacco alcohol or street drug abuse.                          No data recorded                Patient History   Past Medical History:   Diagnosis Date    Hyperlipidemia, unspecified     Dyslipidemia    Nonrheumatic aortic (valve) stenosis     Severe aortic stenosis    Personal history of other diseases of the digestive system     History of gastroesophageal reflux (GERD)    Personal history of other diseases of the musculoskeletal system and connective tissue     Personal history of arthritis     Past Surgical History:   Procedure Laterality Date    HYSTERECTOMY  03/14/2014    Hysterectomy    OTHER SURGICAL HISTORY  03/14/2014    Shoulder Surgery Left     OTHER SURGICAL HISTORY  01/29/2021    Coronary artery stent placement    OTHER SURGICAL HISTORY  01/29/2021    Aortic valve replacement     No family history on file.  Social History     Tobacco Use    Smoking status: Never    Smokeless tobacco: Never   Substance Use Topics    Alcohol use: Not Currently     Alcohol/week: 1.0 standard drink of alcohol     Types: 1 Glasses of wine per week    Drug use: Never       Physical Exam   ED Triage Vitals [11/30/23 0841]   Temp Heart Rate Resp BP   37.5 °C (99.5 °F) 84 16 150/57      SpO2 Temp src Heart Rate Source Patient Position   93 % -- -- --      BP Location FiO2 (%)     -- --       Physical Exam  Constitutional:       Appearance: Normal appearance.   HENT:      Head: Normocephalic.   Eyes:      Extraocular Movements: Extraocular movements intact.      Pupils: Pupils are equal, round, and reactive to light.   Cardiovascular:      Rate and Rhythm: Normal rate and regular rhythm.      Pulses: Normal pulses.      Heart sounds: Normal heart sounds.   Pulmonary:      Effort: Pulmonary effort is normal.      Breath sounds: No wheezing, rhonchi or rales.   Abdominal:      Palpations: Abdomen is soft.      Tenderness: There is abdominal tenderness. There is no right CVA tenderness, left CVA tenderness, guarding or rebound.   Musculoskeletal:         General: No swelling, tenderness or deformity. Normal range of motion.      Cervical back: Normal range of motion.      Comments: No mid spinous or paraspinous tenderness, no pain to the hips lower extremities upper extremities full range of motion of all extremities and digits.   Skin:     General: Skin is warm.      Capillary Refill: Capillary refill takes less than 2 seconds.   Neurological:      General: No focal deficit present.      Mental Status: She is alert.      Cranial Nerves: No cranial nerve deficit.      Sensory: No sensory deficit.      Motor: No weakness.   Psychiatric:         Mood and Affect: Mood normal.          ED Course & MDM   Diagnoses as of 11/30/23 1331   Closed fracture of first lumbar vertebra, unspecified fracture morphology, initial encounter (CMS/[facility])   Diverticulitis of intestine with abscess without bleeding, unspecified part of intestinal tract       Medical Decision Making  Medical Decision Making:  Patient presented as described in HPI. Patient case including ROS, PE, and treatment and plan discussed with ED attending if attached as cosigner. Due to patients presentation orders completed include as documented.  Patient presents to the ED with cc of fall at nursing home times today.  Patient was found between her bed and bedside table by the staff.  They are unsure how long she was there for.  Patient is denying any pain for me.  Patient son is very concerned with abdominal pain she has had for a couple weeks and with blood work showing slight anemia.  Patient send is wishing for a scan of the abdomen.  Patient is nontoxic-appearing, ANO x2 not at baseline according to the son, lung sounds are clear bilaterally, abdomen is soft and tender to the left upper and lower quadrant, no peripheral edema, no pain on palpation to mid spinous or paraspinous regions, hips lower extremities upper extremities and full range of motion of all extremities and digits.  No neurofocal deficits.  Patient is not on any blood thinners.  Pending labs and imaging.  Patient given fluids.  Flu, COVID, CMP, mag, CK all within normal limits.  UA shows UTI.  CT scan of head and neck are negative.  CT chest abdomen and pelvis reveals 6.6 x 5.0 cm gas and fluid collection within the pelvis consistent with an abscess likely related to underlying acute diverticulitis.  Wall thickening of the left side of the colon suggesting inflammatory or ischemic bowel disease.  Acute fracture of the L1 vertebral body.  No acute intrathoracic abnormality.  Patient's son educated on this.  I spoke with Dr. Correa surgery on-call who states recommends  we consult trauma team for the L1 fracture.  I spoke with Dr. Becki galo trauma who recommends patient go to [facility] for trauma consult.  I spoke with family who is agreeable to plan.  Patient started on IV antibiotics.  Patient remained stable in the ER.  Patient will be transferred.  Patient was advised to follow up with PCP or recommended provider in 2-3 days for another evaluation and exam. I advised patient/guardian to return or go to closest emergency room immediately if symptoms change, get worse, new symptoms develop prior to follow up. If there is no improvement in symptoms in the next 24 hours they are advised to return for further evaluation and exam. I also explained the plan and treatment course. Patient/guardian is in agreement with plan, treatment course, and follow up and states verbally that they will comply.    EKG interpretation performed at 1005: Normal sinus rhythm, left axis deviation, no acute signs of ischemia.  Ventricular rate 76 bpm.    Patient care discussed with: N/A  Social Determinants affecting care: N/A    Final diagnosis and disposition as below.  See CI    Transfer. I discussed the differential; results and admit plan with the patient and/or family/friend/caregiver if present.  I emphasized the importance of hospitalization need for re-evaluation/continued monitoring/interventions.. They agreed that if they feel their condition is worsening or if they have any other concern they should alert staff immediately for further assistance. I gave the patient an opportunity to ask all questions they had and answered all of them accordingly. The patient and/or family/friend/caregiver expressed understanding verbally and that they would comply.       Disposition:  transfer    Transfer. Discussed findings and treatment done here in ED with admitting physician. It would be a risk to discharge the patient in their condition due to possibility of deterioration in their condition and the need  for urgent interventions.    This note has been transcribed using voice recognition and may contain grammatical errors, misplaced words, incorrect words, incorrect phrases or other errors.        Labs Reviewed   CBC WITH AUTO DIFFERENTIAL - Abnormal       Result Value    WBC 12.4 (*)     nRBC 0.0      RBC 3.94 (*)     Hemoglobin 9.9 (*)     Hematocrit 31.6 (*)     MCV 80      MCH 25.1 (*)     MCHC 31.3 (*)     RDW 14.1      Platelets 279      Neutrophils % 80.4      Immature Granulocytes %, Automated 0.6      Lymphocytes % 9.2      Monocytes % 9.4      Eosinophils % 0.2      Basophils % 0.2      Neutrophils Absolute 9.99 (*)     Immature Granulocytes Absolute, Automated 0.07      Lymphocytes Absolute 1.14      Monocytes Absolute 1.17 (*)     Eosinophils Absolute 0.02      Basophils Absolute 0.03     COMPREHENSIVE METABOLIC PANEL - Abnormal    Glucose 108 (*)     Sodium 134 (*)     Potassium 3.6      Chloride 94 (*)     Bicarbonate 29      Anion Gap 15      Urea Nitrogen 14      Creatinine 0.74      eGFR 77      Calcium 8.7      Albumin 3.4      Alkaline Phosphatase 141 (*)     Total Protein 7.2      AST 18      Bilirubin, Total 0.5      ALT 20     URINALYSIS WITH REFLEX MICROSCOPIC AND CULTURE - Abnormal    Color, Urine Yellow      Appearance, Urine Clear      Specific Gravity, Urine 1.013      pH, Urine 6.0      Protein, Urine 100 (2+) (*)     Glucose, Urine NEGATIVE      Blood, Urine SMALL (1+) (*)     Ketones, Urine 5 (TRACE) (*)     Bilirubin, Urine NEGATIVE      Urobilinogen, Urine <2.0      Nitrite, Urine NEGATIVE      Leukocyte Esterase, Urine TRACE (*)    MICROSCOPIC ONLY, URINE - Abnormal    WBC, Urine 6-10 (*)     RBC, Urine 3-5      Bacteria, Urine 1+ (*)     Hyaline Casts, Urine 1+ (*)    MAGNESIUM - Normal    Magnesium 2.09     CREATINE KINASE - Normal    Creatine Kinase 104     SARS-COV-2 PCR, SYMPTOMATIC - Normal    Coronavirus 2019, PCR Not Detected      Narrative:     This assay has received FDA  Emergency Use Authorization (EUA) and is only authorized for the duration of time that circumstances exist to justify the authorization of the emergency use of in vitro diagnostic tests for the detection of SARS-CoV-2 virus and/or diagnosis of COVID-19 infection under section 564(b)(1) of the Act, 21 U.S.C. 360bbb-3(b)(1). This assay is an in vitro diagnostic nucleic acid amplification test for the qualitative detection of SARS-CoV-2 from nasopharyngeal specimens and has been validated for use at [facility]. Negative results do not preclude COVID-19 infections and should not be used as the sole basis for diagnosis, treatment, or other management decisions.     INFLUENZA A AND B PCR - Normal    Flu A Result Not Detected      Flu B Result Not Detected      Narrative:     This assay is an in vitro diagnostic multiplex nucleic acid amplification test for the detection and discrimination of Influenza A & B from nasopharyngeal specimens, and has been validated for use at [facility]. Negative results do not preclude Influenza A/B infections, and should not be used as the sole basis for diagnosis, treatment, or other management decisions. If Influenza A/B and RSV PCR results are negative, testing for Parainfluenza virus, Adenovirus and Metapneumovirus is routinely performed for [facility] pediatric oncology and intensive care inpatients, and is available on other patients by placing an add-on request.   URINE CULTURE   BLOOD CULTURE   BLOOD CULTURE   URINALYSIS WITH REFLEX MICROSCOPIC AND CULTURE    Narrative:     The following orders were created for panel order Urinalysis with Reflex Microscopic and Culture.  Procedure                               Abnormality         Status                     ---------                               -----------         ------                     Urinalysis with Reflex M...[[id number]]  Abnormal            Final result               Extra Urine Charles  Tube[[id number]]                            Final result                 Please view results for these tests on the individual orders.   EXTRA URINE GRAY TUBE    Extra Tube Hold for add-ons.        CT head wo IV contrast   Final Result   No evidence of acute cortical infarct or intracranial hemorrhage.                  MACRO:   None             Signed by: Judd Eastman 11/30/2023 11:46 AM   Dictation workstation:   [id number]      CT cervical spine wo IV contrast   Final Result   Multilevel spondylosis without acute osseous abnormality of the   cervical spine.        Interval increased anterolisthesis of C3 on C4 which now measures 5   mm, previously measuring 3 mm on 11/22/2022. MRI may be obtained for   further assessment of underlying ligamentous injury given the   interval change.        MACRO:   None        Signed by: Judd Eastman 11/30/2023 11:49 AM   Dictation workstation:   [id number]      CT chest abdomen pelvis w IV contrast   Final Result   1.6.6 x 5.0 cm gas and fluid collection within the pelvis   consistent with an abscess likely related to underlying acute   diverticulitis.  Wall thickening of the left-side of the colon   suggesting inflammatory or ischemic bowel disease   2.Acute fracture of the L1 vertebral body.   3.No acute intrathoracic abnormality.   4.Evidence of old granulomatous disease.   5.Small hiatal hernia.   Signed by Dion Martin MD           Procedure  Procedures     Amanda Snider PA-C  11/30/23 3340

## 2023-12-01 NOTE — CARE PLAN
The patient's goals for the shift include      The clinical goals for the shift include pain control    Problem: Fall/Injury  Goal: Not fall by end of shift  Outcome: Progressing  Goal: Be free from injury by end of the shift  Outcome: Progressing  Goal: Verbalize understanding of personal risk factors for fall in the hospital  Outcome: Progressing  Goal: Verbalize understanding of risk factor reduction measures to prevent injury from fall in the home  Outcome: Progressing  Goal: Use assistive devices by end of the shift  Outcome: Progressing  Goal: Pace activities to prevent fatigue by end of the shift  Outcome: Progressing     Problem: Pain  Goal: My pain/discomfort is manageable  Outcome: Progressing     Problem: Safety  Goal: Patient will be injury free during hospitalization  Outcome: Progressing  Goal: I will remain free of falls  Outcome: Progressing     Problem: Daily Care  Goal: Daily care needs are met  Outcome: Progressing     Problem: Psychosocial Needs  Goal: Demonstrates ability to cope with hospitalization/illness  Outcome: Progressing  Goal: Collaborate with me, my family, and caregiver to identify my specific goals  Outcome: Progressing     Problem: Discharge Barriers  Goal: My discharge needs are met  Outcome: Progressing     Problem: Pain  Goal: Takes deep breaths with improved pain control throughout the shift  Outcome: Progressing  Goal: Turns in bed with improved pain control throughout the shift  Outcome: Progressing  Goal: Walks with improved pain control throughout the shift  Outcome: Progressing  Goal: Performs ADL's with improved pain control throughout shift  Outcome: Progressing  Goal: Participates in PT with improved pain control throughout the shift  Outcome: Progressing  Goal: Free from opioid side effects throughout the shift  Outcome: Progressing  Goal: Free from acute confusion related to pain meds throughout the shift  Outcome: Progressing     Problem: Skin  Goal: Decreased wound  size/increased tissue granulation at next dressing change  Outcome: Progressing  Goal: Participates in plan/prevention/treatment measures  Outcome: Progressing  Goal: Prevent/manage excess moisture  Outcome: Progressing  Goal: Prevent/minimize sheer/friction injuries  Outcome: Progressing  Goal: Promote/optimize nutrition  Outcome: Progressing  Goal: Promote skin healing  Outcome: Progressing

## 2023-12-01 NOTE — PROGRESS NOTES
Patient was transferred to the floor today, and have received a PT rec of Mod Int, with an AMPAC Score of 11. Patient will need a SNF list to make a well informed decision as to the facility she prefers to provide skilled care. The Care Coordination team will follow patient until discharged.

## 2023-12-01 NOTE — CONSULTS
Orthopaedic Surgery Consult H&P    HPI:   Orthopaedic Problems/Injuries:  L1 compression fx  Other Injuries: none      HPI: 89F (RA, CAD, prior traumatic head bleed) transfer from [facility] after being found down for unknown duration at nursing home. Unable to ambulate, baseline ambulates w walker. Denies back pain.   PMH: per above/EMR  PSH: per above/EMR  SocHx:      -  Denies tobacco use      -  Denies EtOH use      -  Denies other drug use  FamHx:  Non-contributory to this patient's acute orthopaedic problem.   Allergies: Reviewed in EMR  Meds: Reviewed in EMR    ROS      - 14 point ROS negative except as above    Physical Exam:  Gen: AOx3, NAD  HEENT: normocephalic atraumatic  Psych: appropriate mood and affect  Resp: nonlabored breathing  Cardiac: Extremities WWP, RRR to peripheral palpation  Neuro: CN 2-12 grossly intact  Skin: no rashes    Spine Exam:    C5: SILT   Deltoid 5/5 Left; 5/5 Right  C6: SILT   Wrist Ext: 5/5 Left; 5/5 Right  C7: SILT   Triceps: 5/5 Left; 5/5 Right  C8: SILT   Finger flexion: 5/5 Left; 5/5 Right  T1: SILT    Interossei: 5/5 Left; 5/5 Right    Bicep Reflex 2+   Bilaterally  Tran: Negative    L1: SILT       L2: SILT      Hip flexors 5/5 Left; 5/5 Right  L3: SILT      Knee extension 5/5 Left; 5/5 Right  L4: SILT      Tib Ant. (Dorsiflexion) 5/5 Left; 5/5 Right  L5: SILT      EHL 5/5 Left; 5/5 Right  S1: SILT      Plantarflexion 5/5 Left; 5/5 Right    Patellar reflex: 2+   Bilaterally    Babinkski: Intact  No clonus    A full secondary exam was performed and all relevant findings discussed and noted above.    Results for orders placed or performed during the hospital encounter of 11/30/23 (from the past 24 hour(s))   Sars-CoV-2 PCR, Symptomatic   Result Value Ref Range    Coronavirus 2019, PCR Not Detected Not Detected   Influenza A, and B PCR   Result Value Ref Range    Flu A Result Not Detected Not Detected    Flu B Result Not Detected Not Detected   CBC and Auto  Differential   Result Value Ref Range    WBC 12.4 (H) 4.4 - 11.3 x10*3/uL    nRBC 0.0 0.0 - 0.0 /100 WBCs    RBC 3.94 (L) 4.00 - 5.20 x10*6/uL    Hemoglobin 9.9 (L) 12.0 - 16.0 g/dL    Hematocrit 31.6 (L) 36.0 - 46.0 %    MCV 80 80 - 100 fL    MCH 25.1 (L) 26.0 - 34.0 pg    MCHC 31.3 (L) 32.0 - 36.0 g/dL    RDW 14.1 11.5 - 14.5 %    Platelets 279 150 - 450 x10*3/uL    Neutrophils % 80.4 40.0 - 80.0 %    Immature Granulocytes %, Automated 0.6 0.0 - 0.9 %    Lymphocytes % 9.2 13.0 - 44.0 %    Monocytes % 9.4 2.0 - 10.0 %    Eosinophils % 0.2 0.0 - 6.0 %    Basophils % 0.2 0.0 - 2.0 %    Neutrophils Absolute 9.99 (H) 1.60 - 5.50 x10*3/uL    Immature Granulocytes Absolute, Automated 0.07 0.00 - 0.50 x10*3/uL    Lymphocytes Absolute 1.14 0.80 - 3.00 x10*3/uL    Monocytes Absolute 1.17 (H) 0.05 - 0.80 x10*3/uL    Eosinophils Absolute 0.02 0.00 - 0.40 x10*3/uL    Basophils Absolute 0.03 0.00 - 0.10 x10*3/uL   Comprehensive metabolic panel   Result Value Ref Range    Glucose 108 (H) 74 - 99 mg/dL    Sodium 134 (L) 136 - 145 mmol/L    Potassium 3.6 3.5 - 5.3 mmol/L    Chloride 94 (L) 98 - 107 mmol/L    Bicarbonate 29 21 - 32 mmol/L    Anion Gap 15 10 - 20 mmol/L    Urea Nitrogen 14 6 - 23 mg/dL    Creatinine 0.74 0.50 - 1.05 mg/dL    eGFR 77 >60 mL/min/1.73m*2    Calcium 8.7 8.6 - 10.3 mg/dL    Albumin 3.4 3.4 - 5.0 g/dL    Alkaline Phosphatase 141 (H) 33 - 136 U/L    Total Protein 7.2 6.4 - 8.2 g/dL    AST 18 9 - 39 U/L    Bilirubin, Total 0.5 0.0 - 1.2 mg/dL    ALT 20 7 - 45 U/L   Magnesium   Result Value Ref Range    Magnesium 2.09 1.60 - 2.40 mg/dL   Creatine Kinase   Result Value Ref Range    Creatine Kinase 104 0 - 215 U/L   Urinalysis with Reflex Microscopic and Culture   Result Value Ref Range    Color, Urine Yellow Straw, Yellow    Appearance, Urine Clear Clear    Specific Gravity, Urine 1.013 1.005 - 1.035    pH, Urine 6.0 5.0, 5.5, 6.0, 6.5, 7.0, 7.5, 8.0    Protein, Urine 100 (2+) (N) NEGATIVE mg/dL    Glucose,  Urine NEGATIVE NEGATIVE mg/dL    Blood, Urine SMALL (1+) (A) NEGATIVE    Ketones, Urine 5 (TRACE) (A) NEGATIVE mg/dL    Bilirubin, Urine NEGATIVE NEGATIVE    Urobilinogen, Urine <2.0 <2.0 mg/dL    Nitrite, Urine NEGATIVE NEGATIVE    Leukocyte Esterase, Urine TRACE (A) NEGATIVE   Extra Urine Gray Tube   Result Value Ref Range    Extra Tube Hold for add-ons.    Microscopic Only, Urine   Result Value Ref Range    WBC, Urine 6-10 (A) 1-5, NONE /HPF    RBC, Urine 3-5 NONE, 1-2, 3-5 /HPF    Bacteria, Urine 1+ (A) NONE SEEN /HPF    Hyaline Casts, Urine 1+ (A) NONE /LPF       CT thoracic spine wo IV contrast   Final Result   1.  Superior endplate compression of the L1 vertebral body as   described above which is new finding compared to the prior CT from   2021.   2.  The level degenerative disc disease of the lumbar spine as   described above.   3.  Grade 1 anterolisthesis of L4-L5 described above.   4.  Central canal stenosis is visualized at the.  L3-L4, L4-L5 levels.   5.  Multilevel bilateral neuroforamina narrowing of the lumbar spine   as outlined above.   6.  No definite acute thoracic vertebral body compression/fracture.   7.  Multilevel degenerative disc disease of the thoracic spine as   described above.   8. coronary artery calcifications.   Signed by Se Vidal MD      CT lumbar spine wo IV contrast   Final Result   1.  Superior endplate compression of the L1 vertebral body as   described above which is new finding compared to the prior CT from   2021.   2.  The level degenerative disc disease of the lumbar spine as   described above.   3.  Grade 1 anterolisthesis of L4-L5 described above.   4.  Central canal stenosis is visualized at the.  L3-L4, L4-L5 levels.   5.  Multilevel bilateral neuroforamina narrowing of the lumbar spine   as outlined above.   6.  No definite acute thoracic vertebral body compression/fracture.   7.  Multilevel degenerative disc disease of the thoracic spine as   described above.   8.  coronary artery calcifications.   Signed by Se Vidal MD      Consult to Interventional Radiology    (Results Pending)   XR lumbar spine 2-3 views    (Results Pending)       Assessment:  Orthopaedic Problems/Injuries:  L1 compression fx  Other Injuries: none      HPI: 89F (RA, CAD, prior traumatic head bleed) transfer from [facility] above after being found down for unknown duration at nursing home. Unable to ambulate, baseline ambulates w walker. Denies back pain. On exam, NTTP entire spine. 5/5 bilateral UE/LE, Neg Tran bilaterally, BR/DE 2+, Neg clonus/babinski. CT w significant loss of vertebral height at L1. Lumbar Uprights stable.    Of note, CT abdomen w fluid collection concerning for acute diverticulitis w abscess.    Plan:  -  No acute orthopaedic intervention. Admit to Trauma for PT.  - WB: No restrictions  - Pain meds per primary team/ED  - Antibiotics: None from orthopaedic standpoint   - Diet: Okay for diet from orthopaedic standpoint      Dispo per primary/ED        >Patient should follow up w/ Dr. Leahy  in 2 weeks (pt may call [phone] to schedule).       Alfred Johnson MD  On Call Resident - PGY-2 Orthopedic Surgery  [facility]  Epic Message Preferred  Pager: [phone]    This patient was seen within 30 minutes of initial consult.  _________________________________________________________    This patient will be followed peripherally by the Ortho Spine Team while inpatient. See team members and contacts below:       First call: John Cornejo, PGY-2   Second call: Navid Hendrix, PGY-4

## 2023-12-01 NOTE — PROGRESS NOTES
[facility]  TRAUMA SERVICE - PROGRESS NOTE    Patient Name: Amena Chapa  MRN: [id number]  Admit Date: 1130  : 1933  AGE: 89 y.o.   GENDER: female  ==============================================================================  MECHANISM OF INJURY:   89 y/o female present s/p unwitnessed fall at SNF.     LOC (yes/no?): Unknown   Anticoagulant / Anti-platelet Rx? (for what dx?): ASA  Referring Facility Name (N/A for scene EMR run): Arrived via EMS    INJURIES:   L1 Vert Body Fx   Pelvic Abscess secondary to Acute diverticulitis    OTHER MEDICAL PROBLEMS:  HTN  HLD  RA  GERD  Aortic Stenosis  CAD w/ stent on ASA   Dementia A/O X 2 Baseline    INCIDENTAL FINDINGS:  Hiatal Hernia     PROCEDURES:      ==============================================================================  TODAY'S ASSESSMENT AND PLAN OF CARE:  ##L1 Vert Body Fx  -Ortho Spine consulted aprec recs        ->No acute orthopaedic intervention. Admit to Trauma for PT.       -> WB: No restrictions       -> Pain meds per primary team/ED       -> Antibiotics: None from orthopaedic standpoint        -> Diet: Okay for diet from orthopaedic standpoint  -Multimodal w/ Sched Tylenol, Oxy 2.5/5 PRN  -PT/OT eval     ##Pelvic Abscess 2/2 Diverticulitis   -IR Consulted for Drain placement aprec recs       ->Possible procedure today   -Cont IV ABX Zosyn day      ##Comorbidity: HTN, HLD, RA, GERD, Aortic Stenosis, CAD, Dementia   -Cont home Amlodipine, Aricept, Cymbalta, Pepcid, Gabapentin, Synthroid, Melatonin, Mirtazapine, and Xalatan  -Holding Trazodone     ##FEN/GI/: Pre-hospital UIT   -Keep NPO pending IR Procedure  -mIVF   -Q4Hr I/O's   -Check and replace lytes as indicated   -No indication for Durant     ##PPX:  -SCD's  -Holding Lovenox per IR request     Dispo: Cont RNF    Pt. Seen and plan discussed with my attenidng Dr. Becki Beauchamp, APRN-CNP  Trauma Surgery    [id number]    ==============================================================================  CHIEF COMPLAINT / OVERNIGHT EVENTS:   No OVN events or concerns, Pt. Remained in ED throughout much of night. Pt. Seen this AM, found asleep awakens to verbal stimuli, alert to person and place. Pt. Denies physical complaints of pain at this time. Pt. On 2LNC and HDS.      MEDICAL HISTORY / ROS:  Admission history and ROS reviewed. Pertinent changes as follows:      PHYSICAL EXAM:  Heart Rate:  [56-86]   Temp:  [36.7 °C (98 °F)-38.4 °C (101.1 °F)]   Resp:  [14-33]   BP: ()/(44-63)   Height:  [152.4 cm (5')]   Weight:  [54 kg (119 lb 0.8 oz)]   SpO2:  [90 %-100 %]   Physical Exam    IMAGING SUMMARY:  (summary of new imaging findings, not a copy of dictation)      I have reviewed all medications, laboratory results, and imaging pertinent for today's encounter.

## 2023-12-01 NOTE — SIGNIFICANT EVENT
IR consulted for pelvic abscess drainage. Same day CT imaging was reviewed demonstrating a moderately sized pelvic abscess. Given that patient is hemodynamically stable andafebrile with mild leukocytosis and current appropriate medical management, non-emergent drainage may be performed tomorrow. Please keep patient NPO and hold any anticoagulation in anticipation for possible drainage. Case was discussed with the trauma team.    Brendan Benitez MD  Diagnostic Radiology, PGY-3

## 2023-12-01 NOTE — HOSPITAL COURSE
Patient is a 89-year-old female who presented to [facility] as a transfer from outside hospital s/p unwitnessed fall with resulting L1 vertebral body fracture and concern for pelvic abscess secondary to acute diverticulitis. Per reports, patient was found by staff on the floor at her assisted living facility between her bed and bedside table. Unknown downtime. Patient is amnestic to the event and appears to be confabulating her story. Patient's son is also concerned the patient has been endorsing abdominal pain for couple weeks, not sure about complaints of nausea vomiting or diarrhea. Patient taken to outside hospital where imaging revealed acute L1 vertebral body fracture and concerns for pelvic abscess secondary to diverticulitis. Transferred to [facility] for orthospine and trauma evaluations. Orthospine consulted for assistance with L1 vertebral body fracture, no acute interventions or restrictions. Patient also had a positive urinalysis at outside hospital. Due to positive urinalysis and pelvic abscess, patient was started on IV Zosyn for a 7 day course. Blood cultures and urine cultures obtained with no growth to date. IR consulted for pelvic abscess, plan to drain 12/4. Geriatrics consulted for assistance with medical co-morbidities. Patient will additionally need a colonoscopy and colorectal follow up after discharge. PT/OT recommended moderate intensity rehab.    At the time of discharge, patient's pain was controlled with oral analgesia, patient was urinating, having BMs, sleeping, and eating well. Based on PT/OT's recommendation, patient was discharged to SNF on 12/8 with scripts and follow up appointments. Discharge plan was discussed with the patient and all of the patient's questions were answered. Patient and family agreeable to plan.

## 2023-12-01 NOTE — PROGRESS NOTES
"Physical Therapy    Physical Therapy Evaluation    Patient Name: Amena Chapa  MRN: [id number]  Today's Date: 12/1/2023   Time Calculation  Start Time: 0915  Stop Time: 0928  Time Calculation (min): 13 min    Assessment/Plan   PT Assessment  PT Assessment Results: Decreased strength, Decreased range of motion, Decreased endurance, Impaired balance, Decreased mobility, Decreased coordination, Decreased cognition, Impaired judgement, Decreased safety awareness  Rehab Prognosis: Good  End of Session Communication: Bedside nurse  Assessment Comment: pt admitted after unobserved fall with l1 compression fx. pt would benefit from skilled services to help pt return to PLOF . pt is not at baseline.  End of Session Patient Position: Bed, 2 rail up  IP OR SWING BED PT PLAN  Inpatient or Swing Bed: Inpatient  PT Plan  PT Plan: Skilled PT  PT Frequency: 3 times per week  PT Discharge Recommendations: Moderate intensity level of continued care  PT Recommended Transfer Status: Assist x1  PT - OK to Discharge: Yes (EVal completed and recs made.)      Subjective   General Visit Information:  General  Reason for Referral: pt found down at NH with new L1 compression fx  Past Medical History Relevant to Rehab: RA , CAD prior trauma head bleed  Family/Caregiver Present: No  Prior to Session Communication: Bedside nurse  Patient Position Received: Bed, 2 rail up  General Comment: pt lethargic but answering to name. \" i don't understand where i am or what I am doing here, please just get me out of here\"  Home Living:  Home Living  Type of Home:  (pt is poor historian but per EMR came from NH)  Prior Level of Function:  Prior Function Per Pt/Caregiver Report  Level of [place]: Other (Comment) (EMR states that she ambualted with a ww at baseline)  Precautions:     Vital Signs:  Vital Signs  Heart Rate: 77  Heart Rate Source: Monitor  SpO2: 96 % (on 1.5 L NC O2)  BP: 131/56  BP Location: Right arm  BP Method: Automatic  Patient " Position: Lying    Objective   Pain:  Pain Assessment  Pain Assessment: 0-10  Pain Score: 0 - No pain  Cognition:  Cognition  Orientation Level: Other (Comment) (confused at times. pt oriented to name during our session and that is it.)    General Assessments:  Activity Tolerance  Endurance: Tolerates less than 10 min exercise, no significant change in vital signs    Sensation  Light Touch: Not tested    Static Sitting Balance  Static Sitting-Balance Support: Feet supported  Static Sitting-Level of Assistance: Moderate assistance (pt wanting to push posteriorly)    Static Standing Balance  Static Standing-Balance Support: Bilateral upper extremity supported  Static Standing-Level of Assistance: Maximum assistance  Functional Assessments:  Bed Mobility  Bed Mobility: Yes  Bed Mobility 1  Bed Mobility 1: Supine to sitting  Level of Assistance 1: Maximum assistance  Bed Mobility 2  Bed Mobility  2: Sitting to supine  Level of Assistance 2: Dependent (pt attempted to return to supine but started to slip off the bed so became a dependent lift to ensure safety)  Bed Mobility 3  Bed Mobility 3: Scooting  Level of Assistance 3: Dependent    Transfers  Transfer: Yes  Transfer 1  Transfer From 1: Sit to  Transfer to 1: Stand  Technique 1: Sit to stand  Transfer Level of Assistance 1: Arm in arm assistance, Maximum assistance, Maximum verbal cues  Transfers 2  Transfer From 2: Stand to  Transfer to 2: Sit  Technique 2: Stand to sit  Transfer Level of Assistance 2: Arm in arm assistance, Maximum assistance    Ambulation/Gait Training  Ambulation/Gait Training Performed: Yes  Ambulation/Gait Training 1  Surface 1: Level tile  Device 1: No device  Assistance 1: Arm in arm assistance, Maximum assistance  Quality of Gait 1: Antalgic (pt attempted to weight shift to step forward with L LE and unable to weight bear enough through L LE to complete. Pt also needed max cues for sequencing.)  Comments/Distance (ft) 1: took one step with  R only       Extremity/Trunk Assessments:  RLE   RLE : Exceptions to WFL  Strength RLE  RLE Overall Strength: Greater than or equal to 3/5 as evidenced by functional mobility  LLE   LLE : Exceptions to WFL  Strength LLE  LLE Overall Strength: Greater than or equal to 3/5 as evidenced by functional mobility  Outcome Measures:  [facility] Basic Mobility  Turning from your back to your side while in a flat bed without using bedrails: A lot  Moving from lying on your back to sitting on the side of a flat bed without using bedrails: A lot  Moving to and from bed to chair (including a wheelchair): A lot  Standing up from a chair using your arms (e.g. wheelchair or bedside chair): A lot  To walk in hospital room: A lot  Climbing 3-5 steps with railing: Total  Basic Mobility - Total Score: 11    Encounter Problems       Encounter Problems (Active)       Balance       STG - Maintains static standing balance with upper extremity support with CGA and WW  (Progressing)       Start:  12/01/23    Expected End:  12/15/23       INTERVENTIONS:  1. Practice standing with minimal support.  2. Educate patient about standing tolerance.  3. Educate patient about independence with gait, transfers, and ADL's.  4. Educate patient about use of assistive device.  5. Educate patient about self-directed care.         STG - Maintains dynamic sitting balance with upper extremity support with SBA        Start:  12/01/23    Expected End:  12/15/23       INTERVENTIONS:  1. Practice sitting on the edge of a bed/mat with minimal support.  2. Educate patient about maintining total hip precautions while maintaining balance.  3. Educate patient about pressure relief.  4. Educate patient about use of assistive device.            Mobility       STG - Patient will ambulate 25 ft with ww and CGA  (Progressing)       Start:  12/01/23    Expected End:  12/15/23               Transfers       STG - Transfer from bed to chair with  CGA and ww  (Progressing)        Start:  12/01/23    Expected End:  12/15/23            STG - Patient will perform bed mobility with CGA  (Progressing)       Start:  12/01/23    Expected End:  12/15/23                   Education Documentation  Precautions, taught by Veronika Veronica, PT at 12/1/2023 10:55 AM.  Learner: Patient  Readiness: Eager  Method: Explanation  Response: Needs Reinforcement    Body Mechanics, taught by Veronika Veronica, PT at 12/1/2023 10:55 AM.  Learner: Patient  Readiness: Eager  Method: Explanation  Response: Needs Reinforcement    Mobility Training, taught by Veronika Veronica, PT at 12/1/2023 10:55 AM.  Learner: Patient  Readiness: Eager  Method: Explanation  Response: Needs Reinforcement    Education Comments  No comments found.

## 2023-12-02 NOTE — CARE PLAN
The patient's goals for the shift include      The clinical goals for the shift include pain control

## 2023-12-02 NOTE — PROGRESS NOTES
RUPAL spoke with POA son Luis [phone]. He is not seeking SNF admission. Wants patient to return to [facility] with therapy in home. RUPAL built and sent referral to [facility] for return. ADOD after 7 day med regiment.

## 2023-12-02 NOTE — PROGRESS NOTES
.[facility]  TRAUMA SERVICE - PROGRESS NOTE    Patient Name: Amena Chapa  MRN: [id number]  Admit Date: 1130  : 1933  AGE: 89 y.o.   GENDER: female  ==============================================================================  MECHANISM OF INJURY:   89 y/o female present s/p unwitnessed fall at SNF.     LOC (yes/no?): Unknown   Anticoagulant / Anti-platelet Rx? (for what dx?): ASA  Referring Facility Name (N/A for scene EMR run): Arrived via EMS    INJURIES:   L1 Vert Body Fx   Pelvic Abscess secondary to Acute diverticulitis    OTHER MEDICAL PROBLEMS:  HTN  HLD  RA  GERD  Aortic Stenosis  CAD w/ stent on ASA   Dementia A/O X 2 Baseline    INCIDENTAL FINDINGS:  Hiatal Hernia     PROCEDURES:  None as of     ==============================================================================  TODAY'S ASSESSMENT AND PLAN OF CARE:  ##L1 Vert Body Fx  -Ortho Spine consulted aprec recs        ->No acute orthopaedic intervention. Admit to Trauma for PT.       -> WB: No restrictions       -> Pain meds per primary team/ED       -> Antibiotics: None from orthopaedic standpoint        -> Diet: Okay for diet from orthopaedic standpoint  -Multimodal w/ Sched Tylenol, Oxy 2.5/5 PRN  -PT/OT eval     ##Pelvic Abscess 2/2 Diverticulitis   -IR Consulted for Drain placement aprec recs       ->IR to eval for drain placement on  (Monday)  -Diet for now, NPO  night  -Cont IV ABX Zosyn day 3/7   -Blood cultures/Urine culture, NGTD    ##Comorbidity: HTN, HLD, RA, GERD, Aortic Stenosis, CAD, Dementia   -Cont home Amlodipine, Aricept, Cymbalta, Pepcid, Gabapentin, Synthroid, Melatonin, Mirtazapine, and Xalatan  -Holding Trazodone     ##FEN/GI/: Pre-hospital UTI   -Diet resumed, make NPO Jaron night  -mIVF   -Q4Hr I/O's   -Check and replace lytes as indicated   -No indication for Durant     ##PPX:  -SCD's  -Lovenox (Hold on  night per IR)    Dispo: Continued nursing floor  care, pain control, ongoing PT/OT/SW. Upcoming IR drain placment. Clinical course to determine    Pt. Seen and plan discussed with Dr. Татьяна Linda MPAS, M,Ed., PAARETHA  Trauma, Critical Care, Acute Care Surgery  Secure Chat Preferred  #[phone]    ==============================================================================  CHIEF COMPLAINT / OVERNIGHT EVENTS:   No OVN events or concerns, able to get some sleep. Pt. Seen this AM, alert to person and place. Pt. Denies physical complaints of pain at this time. Pt. On 2LNC and HDS.    MEDICAL HISTORY / ROS:  Admission history and ROS reviewed. Pertinent changes as follows: NONE      PHYSICAL EXAM:  Heart Rate:  [58-80]   Temp:  [36.4 °C (97.5 °F)-37.4 °C (99.3 °F)]   Resp:  [15-17]   BP: (105-127)/(45-56)   SpO2:  [93 %-96 %]     Physical Exam  Constitutional:       General: She is not in acute distress.     Appearance: She is not ill-appearing or toxic-appearing.      Comments: Not in extremis   HENT:      Head: Normocephalic.      Nose: No congestion or rhinorrhea.      Mouth/Throat:      Mouth: Mucous membranes are moist.      Pharynx: No oropharyngeal exudate or posterior oropharyngeal erythema.      Comments: Age related dental decay  Eyes:      General: No scleral icterus.     Pupils: Pupils are equal, round, and reactive to light.   Cardiovascular:      Rate and Rhythm: Normal rate and regular rhythm.      Pulses: Normal pulses.      Heart sounds: Normal heart sounds. No murmur heard.     No gallop.   Pulmonary:      Effort: Pulmonary effort is normal. No respiratory distress.      Breath sounds: Normal breath sounds. No wheezing or rales.   Chest:      Chest wall: No tenderness.   Abdominal:      General: Bowel sounds are normal.      Palpations: Abdomen is soft.      Comments: +BM this admission   Musculoskeletal:         General: No swelling, tenderness or signs of injury.      Cervical back: Normal range of motion. No rigidity or tenderness.    Skin:     General: Skin is warm and dry.      Capillary Refill: Capillary refill takes less than 2 seconds.      Coloration: Skin is not jaundiced.      Findings: No erythema or rash.   Neurological:      Mental Status: Mental status is at baseline. She is disoriented.      Comments: A&Ox2, GCS 14 (baseline)   Psychiatric:         Mood and Affect: Mood normal.        IMAGING SUMMARY:   Please refer to imaging studies for all historical imaging and interpretations.       I have reviewed all medications, laboratory results, and imaging pertinent for today's encounter.

## 2023-12-03 NOTE — PROGRESS NOTES
Occupational Therapy    Evaluation/Treatment    Patient Name: Amena Chapa  MRN: [id number]  : 1933  Today's Date: 23  Time Calculation  Start Time: 1040  Stop Time: 1114  Time Calculation (min): 34 min       Assessment:  Medical Staff Made Aware: No  End of Session Communication: Bedside nurse  End of Session Patient Position: Bed, 3 rail up, Alarm on  Medical Staff Made Aware: No  Plan:  Treatment Interventions: ADL retraining, Functional transfer training  OT Frequency: 3 times per week  OT Discharge Recommendations: Moderate intensity level of continued care  OT Recommended Transfer Status: Assist of 1  OT - OK to Discharge: Yes (OT evaluatoin completed; recommendations provided re discharge)  Treatment Interventions: ADL retraining, Functional transfer training    Subjective   Current Problem:  1. Diverticulitis        2. Closed fracture of first lumbar vertebra, unspecified fracture morphology, initial encounter (CMS/[facility])        3. Intra-abdominal abscess (CMS/[facility])          General:   OT Received On: 23  General  Reason for Referral: unwitnessed fall; L1 compression fx  Past Medical History Relevant to Rehab: RA , CAD prior trauma head bleed  Prior to Session Communication: Bedside nurse  Patient Position Received: Bed, 3 rail up  Preferred Learning Style: auditory  General Comment: Pt supine upon arrival, agreeable to participate in therapy. Pt with BM incontinence requiring pericare.  Precautions:  Medical Precautions: Fall precautions  Vital Signs:  Heart Rate: 65  SpO2: 94 %  BP: (!) 102/42  Pain:  Pain Assessment  Pain Assessment: 0-10  Pain Score: 0 - No pain    Objective   Cognition:  Overall Cognitive Status: Impaired at baseline  Orientation Level:  (oriented to self, hospital)  Attention:  (distractible)  Memory: Exceptions to WFL  Short-Term Memory: Impaired  Working Memory: Impaired  Safety/Judgement: Exceptions to WFL  Insight: Moderate  Task Initiation: Initiates with  cues  Planning: Reduced planning skills           Home Living:  Type of Home:  (assisted living facility)  Lives With: Alone  Bathroom Equipment: Shower chair with back  Prior Function:  Receives Help From:  (son, staff at facility)  ADL Assistance: Needs assistance (per patient, able to complete toileting and dressing ADLs and goes to dining room for meals, requires assistance for bathing)  Ambulatory Assistance:  (uses FWW)  IADL History:     ADL:  Eating Assistance: Minimal  Grooming Assistance: Minimal  Bathing Assistance: Maximal  UE Dressing Assistance: Moderate  LE Dressing Assistance: Maximal (to don socks)  Toileting Assistance with Device: Maximal (BM incontinence; max assist for pericare)  Functional Assistance: Minimal (side steps with FWW)  Activities of Daily Living:          Activity Tolerance:  Endurance: Tolerates less than 10 min exercise, no significant change in vital signs  Functional Standing Tolerance:     Bed Mobility/Transfers: Bed Mobility  Bed Mobility: Yes  Bed Mobility 1  Bed Mobility 1: Supine to sitting, Sitting to supine (via log roll)  Level of Assistance 1: Moderate assistance  Bed Mobility Comments 1:  (use of bed rails)  Bed Mobility 2  Bed Mobility  2: Rolling right, Rolling left  Level of Assistance 2: Minimum assistance (use of bed rails)    Transfers  Transfer: Yes  Transfer 1  Technique 1: Sit to stand, Stand to sit  Transfer Device 1: Walker  Transfer Level of Assistance 1: Minimum assistance                           Vision:Vision - Basic Assessment  Current Vision: Wears glasses all the time  Sensation:  Light Touch: No apparent deficits  Strength:  Strength Comments: BUE grossly 4+/5  Other Activity:     Home Environment:     Perception:  Inattention/Neglect: Appears intact  Coordination:  Movements are Fluid and Coordinated: Yes   Hand Function:  Hand Function  Gross Grasp: Functional  Coordination: Functional  Extremities: RUE   RUE : Within Functional Limits and LUE    LUE: Within Functional Limits      Outcome Measures: [facility] Daily Activity  Putting on and taking off regular lower body clothing: A lot  Bathing (including washing, rinsing, drying): A lot  Putting on and taking off regular upper body clothing: A lot  Toileting, which includes using toilet, bedpan or urinal: A little  Taking care of personal grooming such as brushing teeth: A little  Eating Meals: A little  Daily Activity - Total Score: 15        Education Documentation  Body Mechanics, taught by Chrissy Lennon OT at 12/3/2023  2:17 PM.  Learner: Patient  Readiness: Acceptance  Method: Explanation, Demonstration  Response: Needs Reinforcement    Precautions, taught by Chrissy Lennon OT at 12/3/2023  2:17 PM.  Learner: Patient  Readiness: Acceptance  Method: Explanation, Demonstration  Response: Needs Reinforcement    ADL Training, taught by Chrissy Lennon OT at 12/3/2023  2:17 PM.  Learner: Patient  Readiness: Acceptance  Method: Explanation, Demonstration  Response: Needs Reinforcement    Education Comments  No comments found.        Treatment: Pt agreeable to participate in therapy. Pt completing log roll right and left x 2 trails each side with min assist and use of bed rails. Pericare performed with maximum assistance following BM accident. Pt completing supine to sit transfer with log roll technique with moderate assistance to bring trunk upright. Pt able to maintain sitting balance at EOB and complete sit to stand x 2 trials taking right lateral side steps using FWW with minimum assistance. Pt returned to supine via log roll with moderate assistance for positioning of trunk and bringing BLE onto surface of bed. Pt boosted to HOB with max x 2. Pt with reduced activity tolerance and generalized weakness impacting independence with functional mobility and ADLs.        Goals:  Encounter Problems       Encounter Problems (Active)       ADLs       Patient with complete upper body dressing with modified independent  level of assistance   with  while edge of bed. (Progressing)       Start:  12/03/23    Expected End:  12/17/23            Patient with complete lower body dressing with modified independent level of assistance donning pants without a zipper/fasteners and socks with PRN adaptive equipment while edge of bed  and/or supportive standing (Progressing)       Start:  12/03/23    Expected End:  12/17/23            Patient will complete daily grooming tasks brushing teeth and washing face/hair with modified independent level of assistance and PRN adaptive equipment while edge of bed  and/or supportive standing. (Progressing)       Start:  12/03/23    Expected End:  12/17/23            Patient will complete toileting including hygiene clothing management/hygiene with modified independent level of assistance and adaptive equipment as needed (I.e.raised toilet seat and/or grab bars). (Progressing)       Start:  12/03/23    Expected End:  12/17/23               Balance       STG - Maintains static standing balance with upper extremity support with CGA and WW  (Progressing)       Start:  12/01/23    Expected End:  12/03/23       INTERVENTIONS:  1. Practice standing with minimal support.  2. Educate patient about standing tolerance.  3. Educate patient about independence with gait, transfers, and ADL's.  4. Educate patient about use of assistive device.  5. Educate patient about self-directed care.         STG - Maintains dynamic sitting balance with upper extremity support with SBA        Start:  12/01/23    Expected End:  12/03/23       INTERVENTIONS:  1. Practice sitting on the edge of a bed/mat with minimal support.  2. Educate patient about maintining total hip precautions while maintaining balance.  3. Educate patient about pressure relief.  4. Educate patient about use of assistive device.            MOBILITY       Patient will perform Functional mobility mod  Household distances/Community Distances with modified  independent level of assistance and least restrictive device in order to improve safety and functional mobility. (Progressing)       Start:  12/03/23    Expected End:  12/17/23               Mobility       STG - Patient will ambulate 25 ft with ww and CGA  (Progressing)       Start:  12/01/23    Expected End:  12/03/23               TRANSFERS       Patient will perform bed mobility modified independent level of assistance and bed rails in order to improve safety and independence with mobility (Progressing)       Start:  12/03/23    Expected End:  12/17/23            Patient will complete functional transfer to/from toilet and/or room chair with least restrictive device with modified independent level of assistance. (Progressing)       Start:  12/03/23    Expected End:  12/17/23               Transfers       STG - Transfer from bed to chair with  CGA and ww  (Progressing)       Start:  12/01/23    Expected End:  12/03/23            STG - Patient will perform bed mobility with CGA  (Progressing)       Start:  12/01/23    Expected End:  12/03/23

## 2023-12-03 NOTE — PROGRESS NOTES
[facility]  TRAUMA SERVICE - PROGRESS NOTE    Patient Name: Amena Chapa  MRN: [id number]  Admit Date: 1130  : 1933  AGE: 89 y.o.   GENDER: female  ==============================================================================  MECHANISM OF INJURY:   89 y/o female present s/p unwitnessed fall at SNF.     LOC (yes/no?): Unknown   Anticoagulant / Anti-platelet Rx? (for what dx?): ASA  Referring Facility Name (N/A for scene EMR run): Arrived via EMS    INJURIES:   L1 Vert Body Fx   Pelvic Abscess secondary to Acute diverticulitis    OTHER MEDICAL PROBLEMS:  HTN  HLD  RA  GERD  Aortic Stenosis  CAD w/ stent on ASA   Dementia A/O X 2 Baseline    INCIDENTAL FINDINGS:  Hiatal Hernia     PROCEDURES:  None as of 12/3 (pending IR drain placement)    ==============================================================================  TODAY'S ASSESSMENT AND PLAN OF CARE:  ##L1 Vert Body Fx  -Ortho Spine consulted aprec recs        ->No acute orthopaedic intervention. Admit to Trauma for PT.       -> WB: No restrictions       -> Pain meds per primary team/ED       -> Antibiotics: None from orthopaedic standpoint        -> Diet: Okay for diet from orthopaedic standpoint  -Multimodal w/ Sched Tylenol, Oxy 2.5/5 PRN  -PT/OT eval     ##Pelvic Abscess 2/2 Diverticulitis   -IR Consulted for Drain placement aprec recs       ->IR to eval for drain placement on  (Monday)  -Diet for now, NPO  night  -Cont IV ABX Zosyn day 3/7   -Blood cultures/Urine culture, NGTD x2 days  -Will need colonoscopy and colorectal follow up after discharge (to be scheduled)    ##Comorbidity: HTN, HLD, RA, GERD, Aortic Stenosis, CAD, Dementia   -Cont home Amlodipine, Aricept, Cymbalta, Pepcid, Gabapentin, Synthroid, Melatonin, Mirtazapine, and Xalatan  -Holding Trazodone   -Follow up geriatric consult     ##FEN/GI/: Pre-hospital UTI   -NPO  night  -mIVF   -Q4Hr I/O's   -Check and replace  lymisha as indicated     -Hypokalemia/hyupomagnesmeia on 12/3 --> corrected  -No indication for [place]     ##PPX:  -SCD's  -Lovenox (Hold on Sunday night per IR)    Dispo: Continued nursing floor care, pain control, ongoing PT/OT/SW. Patient recc'd for SNF but family wants her to retrun to assisted living for in home PT/OT on discharge. Pending IR drain placment. Clinical course to determine    Pt. Seen and plan discussed with Dr. Татьяна Linda, ÁNGELS, M,Ed., PA-C  Trauma, Critical Care, Acute Care Surgery  Secure Chat Preferred  #[phone]    ==============================================================================  CHIEF COMPLAINT / OVERNIGHT EVENTS:   Patient was seen sitting up in bed appearing comfortable. Patient in no acute distress, able to get sleep overnight. Patient denies any new headaches, chest pain, SOB, nausea, vomiting, or acute pain.  No acute events overnight per nursing staff.      MEDICAL HISTORY / ROS:  Admission history and ROS reviewed. Pertinent changes as follows: NONE      PHYSICAL EXAM:  Heart Rate:  [51-83]   Temp:  [36.4 °C (97.5 °F)-37.7 °C (99.9 °F)]   Resp:  [17-18]   BP: (112-142)/(46-62)   SpO2:  [93 %-97 %]     Physical Exam  Constitutional:       General: She is not in acute distress.     Appearance: She is not ill-appearing or toxic-appearing.      Comments: Not in extremis   HENT:      Head: Normocephalic.      Nose: No congestion or rhinorrhea.      Mouth/Throat:      Mouth: Mucous membranes are moist.      Pharynx: No oropharyngeal exudate or posterior oropharyngeal erythema.      Comments: Age related dental decay  Eyes:      General: No scleral icterus.     Pupils: Pupils are equal, round, and reactive to light.   Cardiovascular:      Rate and Rhythm: Normal rate and regular rhythm.      Pulses: Normal pulses.      Heart sounds: Normal heart sounds. No murmur heard.     No gallop.   Pulmonary:      Effort: Pulmonary effort is normal. No respiratory distress.       Breath sounds: Normal breath sounds. No wheezing or rales.      Comments: Speaks in full sentences, no overnight oxygen requirements  Chest:      Chest wall: No tenderness.   Abdominal:      General: Bowel sounds are normal.      Palpations: Abdomen is soft.      Tenderness: There is abdominal tenderness.      Comments: +BM this admission. Patient complaining of slight global abdominal tenderness on palpation. Abdomen not distended, no masses or gross organomegaly   Musculoskeletal:         General: No swelling, tenderness or signs of injury.      Cervical back: Normal range of motion. No rigidity or tenderness.   Skin:     General: Skin is warm and dry.      Capillary Refill: Capillary refill takes less than 2 seconds.      Coloration: Skin is not jaundiced.      Findings: No erythema or rash.      Comments: Age related skin changes   Neurological:      Mental Status: Mental status is at baseline. She is disoriented.      Comments: A&Ox2, GCS 14 (baseline)   Psychiatric:         Mood and Affect: Mood normal.        IMAGING SUMMARY:   Please refer to imaging studies for all historical imaging and interpretations.     I have reviewed all medications, laboratory results, and imaging pertinent for today's encounter.

## 2023-12-03 NOTE — PROGRESS NOTES
Patient discussed with medical team. Patient is pending IR and [place]ony consult. Patient's son wishes for her to return to [facility] where she resides instead of SNF. A referral was submitted to [place], awaiting response. SW will continue to follow to assist with a safe discharge plan.    Delphien Leon LCSW

## 2023-12-03 NOTE — CARE PLAN
The patient's goals for the shift include      The clinical goals for the shift include pt. will eat and stay awake

## 2023-12-04 NOTE — PRE-PROCEDURE NOTE
Interventional Radiology Preprocedure Note    Indication for procedure:  pelvic fluid collection    Relevant review of systems: NA    Relevant Labs:   Lab Results   Component Value Date    CREATININE 0.72 12/03/2023    EGFR 80 12/03/2023    INR 1.2 (H) 12/02/2023    PROTIME 13.5 (H) 12/02/2023       Planned Sedation/Anesthesia: Moderate    Airway assessment: normal    Directed physical examination:    Physical Exam  HENT:      Head: Normocephalic and atraumatic.   Cardiovascular:      Rate and Rhythm: Normal rate.   Pulmonary:      Effort: Pulmonary effort is normal.   Neurological:      Mental Status: She is alert.          Mallampati: III (soft and hard palate and base of uvula visible)    ASA Score: ASA 3 - Patient with moderate systemic disease with functional limitations    Benefits, risks and alternatives of procedure and planned sedation have been discussed with the patient and/or their representative. All questions answered and they agree to proceed.

## 2023-12-04 NOTE — POST-PROCEDURE NOTE
Interventional Radiology Brief Postprocedure Note    Attending: Tricia Coelho MD    Assistant: Yasmin Street MD    Diagnosis: pelvic abscess    Consent was obtained and a timeout was performed.    Description of procedure: Limited ultrasonographic images were obtained through the [pelvis] for the purposes of needle guidance were taken. The images demonstrate [the complex appearing fluid collection adjacent to the bladder.].     Patient was placed in [supine] position and prepped in the usual sterile manner. Lidocaine was used for local anesthesia. Utilizing ultrasound guidance a 19G Yueh needle was used to access the fluid collection. A 035 wire was then used to maintain access in the collection after removal of the Yueh needle. Cloudy, yellow and blood-tinged fluid was aspirated. An [8] [place] pigtail drainage catheter was placed in the collection. The pigtail drain was formed, connected to drain, and sutured/secured in place.     Postprocedure images demonstrated [a replaced pigtail catheter within the collection no evidence of hemorrhage.]      [Fluid sample was sent to the laboratory for further analysis.]     The patient tolerated the procedure well without any immediate complications.     Anesthesia:  Local, moderate sedation with 50 mcg fentanyl and 1 mg versed.    Complications: None    Estimated Blood Loss: minimal    Yellow, cloudy, blood tinged fluid was aspirated and sent to the lab for further analysis.    See detailed result report with images in PACS.    The patient tolerated the procedure well without incident or complication and is in stable condition.

## 2023-12-04 NOTE — PROGRESS NOTES
Physical Therapy    Physical Therapy Treatment    Patient Name: Amena Chapa  MRN: [id number]  Today's Date: 12/4/2023  Time Calculation  Start Time: 1431  Stop Time: 1500  Time Calculation (min): 29 min       Assessment/Plan   PT Assessment  End of Session Communication: Bedside nurse  Assessment Comment: pt remains appropriate for mod intensity therapy upon discharge 2/2 impaired balance and transfers.  End of Session Patient Position: Up in chair, Alarm on     PT Plan  PT Plan: Skilled PT  PT Frequency: 3 times per week  PT Discharge Recommendations: Moderate intensity level of continued care  PT Recommended Transfer Status: Assist x1  PT - OK to Discharge: Yes (EVal completed and recs made.)      General Visit Information:   PT  Visit  PT Received On: 12/04/23  Prior to Session Communication: Bedside nurse  Patient Position Received: Bed, 3 rail up, Alarm on  Family/Caregiver Present: Yes  Caregiver Feedback: son and DIL present and supportive throughout sessio  General Comment: pt supine, alert and agreeable for therapy. pt desat on RA ambulating ~8ft to 81% SpO2. recovered seated EOB with 3Ldonned. RN aware. pt requires min-modA with FWW for transfers 2/2 unsteadiness.     Subjective   Precautions:  Precautions  Medical Precautions: Fall precautions  Vital Signs:   Desat to 81 on RA with ambulating. At 98% on 3L at start of session. Resovled to 93-94% on 3L O2. RN aware.     Objective   Pain:  Pain Assessment  Pain Assessment: 0-10  Pain Score: 0 - No pain  Cognition:  Cognition  Overall Cognitive Status: Impaired  Orientation Level: Disoriented to time, Disoriented to situation  Lines/Tubes/Drains:  Closed/Suction Drain Left;Ventral RLQ Bulb 8 Fr. (Active)   Number of days: 0       External Urinary Catheter Female (Active)   Number of days: 3       PT Treatments:     Therapeutic Activity  Therapeutic Activity Performed: Yes  Therapeutic Activity 1: pt sat EOB ~5 minutes with education on PLB 2/2 to desat  with ambulating.     Bed Mobility  Bed Mobility: Yes  Bed Mobility 1  Bed Mobility 1: Sitting to supine  Level of Assistance 1: Minimum assistance, Moderate verbal cues  Bed Mobility 2  Bed Mobility  2: Scooting  Level of Assistance 2: Moderate assistance (use of draw sheet to pull hips anteriorly)  Ambulation/Gait Training  Ambulation/Gait Training Performed: Yes  Ambulation/Gait Training 1  Surface 1: Level tile  Device 1: No device  Assistance 1: Moderate verbal cues, Minimum assistance  Quality of Gait 1: Narrow base of support, Antalgic (pt required cues on starting gait with LLE 2/2 having LLE lag behind with initial forward steps. improved after sitting then standing with FWW. forward flexed posture. cues on sequencing throughout)  Comments/Distance (ft) 1: 2ftx1; 8ftx1  Transfers  Transfer: Yes  Transfer 1  Transfer From 1: Bed to  Transfer to 1: Stand  Technique 1: Sit to stand  Transfer Device 1:  (1st trial with arm and arm; trials 2-3 with FWW)  Transfer Level of Assistance 1: Minimum assistance, Moderate verbal cues  Trials/Comments 1: 3  Transfers 2  Transfer From 2: Stand to  Transfer to 2: Bed, Chair with arms  Technique 2: Stand to sit  Transfer Device 2: Walker  Transfer Level of Assistance 2: Minimum assistance, Minimal verbal cues  Trials/Comments 2: 3             Outcome Measures:  [facility] Basic Mobility  Turning from your back to your side while in a flat bed without using bedrails: A lot  Moving from lying on your back to sitting on the side of a flat bed without using bedrails: A little  Moving to and from bed to chair (including a wheelchair): A little  Standing up from a chair using your arms (e.g. wheelchair or bedside chair): A little  To walk in hospital room: A little  Climbing 3-5 steps with railing: Total  Basic Mobility - Total Score: 15        Education Documentation  Precautions, taught by Sherrill Zhu, PT at 12/4/2023  3:29 PM.  Learner: Patient  Readiness:  Acceptance  Method: Explanation  Response: Needs Reinforcement    Body Mechanics, taught by Sherrill Zhu PT at 12/4/2023  3:29 PM.  Learner: Patient  Readiness: Acceptance  Method: Explanation  Response: Needs Reinforcement    Mobility Training, taught by Sherrill Zhu PT at 12/4/2023  3:29 PM.  Learner: Patient  Readiness: Acceptance  Method: Explanation  Response: Needs Reinforcement    Education Comments  No comments found.          OP EDUCATION:       Encounter Problems       Encounter Problems (Active)       Balance       STG - Maintains static standing balance with upper extremity support with CGA and WW  (Progressing)       Start:  12/01/23    Expected End:  12/11/23       INTERVENTIONS:  1. Practice standing with minimal support.  2. Educate patient about standing tolerance.  3. Educate patient about independence with gait, transfers, and ADL's.  4. Educate patient about use of assistive device.  5. Educate patient about self-directed care.         STG - Maintains dynamic sitting balance with upper extremity support with SBA  (Progressing)       Start:  12/01/23    Expected End:  12/11/23       INTERVENTIONS:  1. Practice sitting on the edge of a bed/mat with minimal support.  2. Educate patient about maintining total hip precautions while maintaining balance.  3. Educate patient about pressure relief.  4. Educate patient about use of assistive device.            Mobility       STG - Patient will ambulate 25 ft with ww and CGA  (Progressing)       Start:  12/01/23    Expected End:  12/11/23               Pain - Adult          Transfers       STG - Transfer from bed to chair with  CGA and ww  (Progressing)       Start:  12/01/23    Expected End:  12/11/23            STG - Patient will perform bed mobility with CGA  (Progressing)       Start:  12/01/23    Expected End:  12/11/23 12/04/23 at 3:30 PM   Sherrill Zhu, MANAN   Rehab Office: [phone]

## 2023-12-04 NOTE — PROGRESS NOTES
Referral sent to [facility] for patient return. SW called facility at [phone]. SW explained OT has recommended moderate intensity and we are awaiting PT recommendations. The facility explained that if the patient is recommended for SNF, she will not be able to return to the [place]. SW called patient son, Luis [phone], to provide update about recommendations, as well as what they facility is saying. Patient son, still wants for her to return to [facility], even though the facility is refusing to accept her back with current recommendations. Luis is requesting a call from PT or OT, as he feels there is no reason for to go to a SNF. SW will continue to follow to facilitate discharge plan.       Maeve Brown, ESPERANZA

## 2023-12-04 NOTE — CONSULTS
"Inpatient consult to Geriatric Medicine  Consult performed by: Rachell Dang MD  Consult ordered by: Franko Beauchamp, APRN-CNP        Primary Team: Trauma    Admit Date: 11/30/2023    Emergency Contact:   Extended Emergency Contact Information  Primary Emergency Contact: BRITTANY CHAPA  Address: [address]           [place] [address] [facility]  Home Phone: [phone]  Work Phone: [phone]  Mobile Phone: [phone]  Relation: Child  Secondary Emergency Contact: JUAN C CHAPA  Address: [address]           [place] [address] [facility]  Home Phone: [phone]  Work Phone: [phone]  Mobile Phone: [phone]  Relation: None     Reason For Consult: elderly trauma    History Of Present Illness:  Amena Chapa is a 89 y.o. female with PMH depression, AS s/p AVR (2014), HTN, HLD, RA, hypothyroidism, GERD, recurrent falls admitted on 11/30 after a fall c/b acute L1 vertebral body fracture. Patient is a resident of [facility] and was found by staff on the floor between her bed and bedside table, time down is unclear. Workup significant for acute L1 vertebral body fracture. Ortho was consulted, being managed nonoperatively. Workup otherwise notable for CT abd/pelvis showing acute diverticulitis c/b pelvic abscess. Currently on Zosyn with plan for drain placement by IR 12/4.     Of note, patient had a fall approx. 1 year ago (11/2022) with resultant left hip fracture, underwent closed reduction with IM nailing at that time. In the past several years, patient has had several falls c/b fractures of various bones as well as intracerebral hemorrhage.    Also of note, patient has never had formal cognitive testing although is currently on donepezil and memantine. Donepezil started 08/2023 by PCP due to \"memory concerns.\" Unable to find evidence for who started memantine and when.     History per patient:  Patient reports she got out of bed to " "get dressed and fell.  No further history provided by patient.    History per family/caregiver: (obtained in addition due to patient’s suspected underlying cognitive impairment)  Spoke with son Luis and daughter in law at bedside thi afternoon.  They report that patient has been living at [facility] for the last 2 years.  Patient initially moved there due to recurrent falls and need for increased socialization after her  passed away.  Patient has a long history of depression which seemingly worsened after the death of her .  Per son and daughter-in-law, at baseline patient is alert and oriented to person, place, time.  They have noted some instances where the patient forgets words or names but notes that \"she is 89 years old.\"  Otherwise they deny any other short-term forgetfulness as well as long-term forgetfulness.  At baseline she is largely independent with BADLs, requires assistance only with bathing and urinary continence (she wears Dipends).  She is able to use the phone independently, but relies on the assisted living facility for other IADLs such as shopping, cooking, housekeeping, laundry, transportation, medications, finances.  Son reports she ambulates with a walker but does not require use of other assisted devices such as glasses or hearing aids.  Of note English is her second language which may confound assessment of cognition and/or orientation.    What matters most to the patient:  [place], family notes she is \"stubborn in a good way\"     Prior to Admission Meds  Prior to Admission Medications   Prescriptions Last Dose Informant Patient Reported? Taking?   DULoxetine (Cymbalta) 30 mg DR capsule Unknown  Yes No   Sig: Take 1 capsule (30 mg) by mouth 2 times a day. Noon and bedtime Do not crush or chew.   acetaminophen (Tylenol) 325 mg tablet Unknown  Yes No   Sig: Take 2 tablets (650 mg) by mouth 2 times a day. lunchtime and bedtime   acetaminophen (Tylenol) 325 mg " tablet Unknown  Yes No   Sig: Take 2 tablets (650 mg) by mouth every 4 hours if needed for mild pain (1 - 3) or fever (temp greater than 38.0 C).   amLODIPine (Norvasc) 10 mg tablet Unknown  Yes No   Sig: Take 1 tablet (10 mg) by mouth once daily. afternoon   aspirin 81 mg EC tablet Unknown  Yes No   Sig: Take 1 tablet (81 mg) by mouth 2 times a day. Noon and bedtime   donepezil (Aricept) 10 mg tablet Unknown  Yes No   Sig: Take 1 tablet (10 mg) by mouth once daily at bedtime.   famotidine (Pepcid) 10 mg tablet Unknown  Yes No   Sig: Take 1 tablet (10 mg) by mouth once daily at bedtime.   gabapentin (Neurontin) 300 mg capsule Unknown  Yes No   Sig: Take 1 capsule (300 mg) by mouth once daily at bedtime.   hydrocortisone 1 % cream Unknown  Yes No   Sig: Apply 1 Application topically 2 times a day as needed.   latanoprost (Xalatan) 0.005 % ophthalmic solution Unknown  Yes No   Sig: Administer 1 drop into both eyes once daily at bedtime.   levothyroxine (Synthroid, Levoxyl) 88 mcg tablet Unknown  Yes No   Sig: Take 1 tablet (88 mcg) by mouth once daily in the morning. Take before meals.   loperamide (Imodium) 2 mg capsule Unknown  Yes No   Sig: Take 1 capsule (2 mg) by mouth 4 times a day as needed for diarrhea.   loratadine (Claritin) 10 mg tablet Unknown  Yes No   Sig: Take 1 tablet (10 mg) by mouth once daily at bedtime.   magnesium hydroxide (Milk of Magnesia) 400 mg/5 mL suspension Unknown  Yes No   Sig: Take 10 mL by mouth every other day if needed for constipation.   melatonin 5 mg tablet Unknown  Yes No   Sig: Take 2 tablets (10 mg) by mouth once daily at bedtime.   memantine (Namenda) 10 mg tablet Unknown  Yes No   Sig: Take 1 tablet (10 mg) by mouth once daily at bedtime.   mirtazapine (Remeron) 15 mg tablet Unknown  Yes No   Sig: Take 1 tablet (15 mg) by mouth once daily at bedtime.   omeprazole (PriLOSEC) 40 mg DR capsule Unknown  Yes No   Sig: Take 1 capsule (40 mg) by mouth once daily. Afternoon Do not  crush or chew.   ondansetron (Zofran) 4 mg tablet Unknown  Yes No   Sig: Take 1 tablet (4 mg) by mouth 2 times a day as needed for nausea or vomiting.   polyethylene glycol (Glycolax, Miralax) 17 gram packet Unknown  Yes No   Sig: Take 17 g by mouth every other day.   traZODone (Desyrel) 50 mg tablet Unknown  Yes No   Sig: Take 1 tablet (50 mg) by mouth once daily at bedtime.   triamcinolone (Kenalog) 0.1 % cream Unknown  Yes No   Sig: Apply 1 Application topically 2 times a day as needed. To the left ulnar forearm      Facility-Administered Medications: None        Current Meds in Hospital  Current Facility-Administered Medications   Medication Dose Route Frequency Provider Last Rate Last Admin    acetaminophen (Tylenol) tablet 975 mg  975 mg oral q8h Adams Galvan PA-C   975 mg at 12/03/23 0840    amLODIPine (Norvasc) tablet 10 mg  10 mg oral Daily Adams Galvan PA-C   10 mg at 12/04/23 0823    donepezil (Aricept) tablet 10 mg  10 mg oral Nightly Adams Galvan PA-C   10 mg at 12/03/23 2025    DULoxetine (Cymbalta) DR capsule 30 mg  30 mg oral BID Adams Galvan PA-C   30 mg at 12/04/23 0823    [Held by provider] enoxaparin (Lovenox) syringe 30 mg  30 mg subcutaneous q12h Adams Galvan PA-C   30 mg at 12/03/23 0840    famotidine (Pepcid) tablet 10 mg  10 mg oral Nightly Adams Galvan PA-C   10 mg at 12/03/23 2025    gabapentin (Neurontin) capsule 300 mg  300 mg oral Nightly Adams Galvan PA-C   300 mg at 12/03/23 2025    haloperidol lactate (Haldol) injection 0.5 mg  0.5 mg intravenous q4h PRN MACHO Bryant        lactated Ringer's infusion  75 mL/hr intravenous Continuous MACHO Bryant 75 mL/hr at 12/03/23 0147 75 mL/hr at 12/03/23 0147    latanoprost (Xalatan) 0.005 % ophthalmic solution 1 drop  1 drop Both Eyes Nightly Adams Galvan PA-C   1 drop at 12/03/23 2025    levothyroxine (Synthroid, Levoxyl) tablet 88 mcg  88 mcg oral Daily before breakfast Adams Galvan PA-C    88 mcg at 12/04/23 0823    lidocaine 4 % patch 1 patch  1 patch transdermal Daily MACHO Bryant   1 patch at 12/03/23 1352    melatonin tablet 10 mg  10 mg oral Nightly Adams Galvan PA-C   10 mg at 12/03/23 2025    memantine (Namenda) tablet 10 mg  10 mg oral Nightly Adams Galvan PA-C   10 mg at 12/03/23 2025    mirtazapine (Remeron) tablet 15 mg  15 mg oral Nightly Adams Galvan PA-C   15 mg at 12/03/23 2025    naloxone (Narcan) injection 0.2 mg  0.2 mg intravenous q5 min PRN Adams Galvan PA-C        ondansetron (Zofran) tablet 4 mg  4 mg oral q8h PRN MACHO Bryant        Or    ondansetron (Zofran) injection 4 mg  4 mg intravenous q8h PRN MACHO Bryant        oxyCODONE (Roxicodone) immediate release tablet 5 mg  5 mg oral q4h PRN MACHO Bryant   5 mg at 12/01/23 1623    oxygen (O2) therapy   inhalation Continuous - 02/gases Adams Galvan PA-C   Start at 12/04/23 0800    piperacillin-tazobactam-dextrose (Zosyn) IV 3.375 g  3.375 g intravenous q6h Lazarus Najera  mL/hr at 12/04/23 0824 3.375 g at 12/04/23 0824    sennosides-docusate sodium (Dawna-Colace) 8.6-50 mg per tablet 2 tablet  2 tablet oral BID Adams Galvan PA-C   2 tablet at 12/03/23 0840    [Held by provider] traZODone (Desyrel) tablet 50 mg  50 mg oral Nightly Adams Galvan PA-C            The patient's outpatient electronic medical record (EMR) is reviewed, notable information includes several falls in the last several years complicated by various rib, skull fractures and intracerebral hemorrhage.      Past Medical History  Past Medical History:   Diagnosis Date    Hyperlipidemia, unspecified     Dyslipidemia    Nonrheumatic aortic (valve) stenosis     Severe aortic stenosis    Personal history of other diseases of the digestive system     History of gastroesophageal reflux (GERD)    Personal history of other diseases of the musculoskeletal system and connective tissue     Personal  history of arthritis        Surgical History  Past Surgical History:   Procedure Laterality Date    HYSTERECTOMY  03/14/2014    Hysterectomy    OTHER SURGICAL HISTORY  03/14/2014    Shoulder Surgery Left    OTHER SURGICAL HISTORY  01/29/2021    Coronary artery stent placement    OTHER SURGICAL HISTORY  01/29/2021    Aortic valve replacement        Family History  No FH dementia or memory issues     Social History  -Alcohol use: No  -Tobacco use: No  -Illicit drug use: No  -Exercise: None  -Spiritual needs: Baptism    Occupation: Retired from [employer]  Highest Level of Education: Less than High School (2nd grade)  Community Resources: none, lives in Assisted Living Facility  [place]: No  Current living environment: [facility]    Activities of Daily Living:  Basic ADLs: (I= independent, A= assistance, D= dependent)  Bathing: A , Dressing: I, Toileting: I, Transferring: I, Continence: D , Feeding: I    Mcconnell Index:  4  Instrumental ADLs: (I= independent, A= assistance, D= dependent)  Ability to use phone: I, Shopping: D , Cooking: D , Housekeeping: D , Laundry: D , Transportation: D , Medications: D , Handle Finances: D    Cora Scale:  1    Allergies  Patient has no known allergies.    ROS:  -Negative for pain  -Denies dyspnea, chest pain, palpitations  -Endorses intermittent reflux   -Denies abdominal pain, n/v     Documents on file and valid:  Advance Directive/Living Will: Yes   Health Care Power of : Yes - son Luis (documents scanned into EMR)  Code Status: per conversation with HCPOA/son on 12/4, patient wishes to be DNR/DNI      Confusion Assessment Method (CAM)  Not on file.    Emily Cognitive Assessment (MoCA)  Not on file.    Geriatric Depression Scale (GDS)  Not on file.    Mini-Cog (Early Dementia Detection)  Not on file.    Folstein Mini-Mental State Exam (MMSE)  Not on file.    Patient Health Questionnaire (PHQ-9)  Not on file.     Physical  Exam  Constitutional:       General: She is not in acute distress.     Appearance: Normal appearance. She is not ill-appearing or diaphoretic.   HENT:      Head: Normocephalic and atraumatic.      Right Ear: External ear normal.      Left Ear: External ear normal.      Nose: Nose normal.      Mouth/Throat:      Mouth: Mucous membranes are moist.      Pharynx: Oropharynx is clear.   Eyes:      General: No scleral icterus.     Extraocular Movements: Extraocular movements intact.      Conjunctiva/sclera: Conjunctivae normal.   Cardiovascular:      Rate and Rhythm: Normal rate and regular rhythm.      Pulses: Normal pulses.      Heart sounds: Normal heart sounds. No murmur heard.     No friction rub. No gallop.   Pulmonary:      Effort: Pulmonary effort is normal. No respiratory distress.      Breath sounds: Normal breath sounds. No wheezing, rhonchi or rales.   Abdominal:      General: Abdomen is flat. Bowel sounds are normal. There is no distension.      Palpations: Abdomen is soft.      Tenderness: There is no abdominal tenderness. There is no guarding or rebound.   Musculoskeletal:         General: No swelling. Normal range of motion.      Right lower leg: No edema.      Left lower leg: No edema.   Skin:     General: Skin is warm and dry.      Coloration: Skin is not jaundiced.      Findings: No lesion or rash.   Neurological:      General: No focal deficit present.      Mental Status: She is alert.      Comments: AO to person/place. Not able to name specific hospital. Does not know year or month.         Visit Vitals  /59   Pulse 65   Temp 36 °C (96.8 °F) (Temporal)   Resp 18   Ht 1.524 m (5')   Wt 54 kg (119 lb 0.8 oz)   SpO2 97% Comment: on 2 l. nc   BMI 23.25 kg/m²   Smoking Status Never   BSA 1.51 m²      Last Recorded Vitals      12/3/2023     3:20 PM 12/3/2023     7:42 PM 12/3/2023    11:35 PM 12/4/2023     4:59 AM 12/4/2023     7:27 AM 12/4/2023     9:08 AM 12/4/2023     9:37 AM   Vitals   Systolic 122  136 135 143 128 147 134   Diastolic 52 54 56 63 56 63 59   Heart Rate 60 65 64 65 61 66 65   Temp 36.6 °C (97.9 °F) 36.9 °C (98.4 °F) 37.2 °C (99 °F) 37.4 °C (99.3 °F) 37 °C (98.6 °F) 37 °C (98.6 °F) 36 °C (96.8 °F)   Resp 17 18 16  17 16 18      Vitals:    11/30/23 1618   Weight: 54 kg (119 lb 0.8 oz)        Confusion Assessment Method(CAM) for diagnosis of delirium:    1.  Acute onset or fluctuating course: absent/present: Absent  2.  Inattention: absent/present: Absent  3.  Disorganized thinking: absent/present: Absent  4.  Altered level of consciousness: absent/present: Absent  CAM: negative    AT Score For Assessment of Delirium and Cognitive Impairment:    Alertness: 0  Normal(fully alert,but not agitated, throughout assessment)=0  Mild sleepiness for <10 seconds after walking, then normal=0  Clearly abnormal=4  2.  AMT4: 2  No mistakes=0  One mistake=1  Two or more mistakes/untestable=2  3.  Attention: 1  Achieves seven months or more correctly=0  Starts but scores <7 months/ refuses to start=1  Untestable(cannot start because unwell, drowsy, inattentive)=2  4.  Acute: 0  No=0  Yes=4    Total Score: 3  4 or above: Possible delirium +/- cognitive impairment  1-3: Possible cognitive impairment  0: Delirium or severe cognitive impairment unlikely(but delirium still possible if (4) information incomplete)     Relevant Results  Thyroid Stimulating Hormone   Date Value Ref Range Status   11/02/2023 6.59 (H) 0.44 - 3.98 mIU/L Final   08/02/2022 5.04 (H) 0.27 - 4.20 MIU/L Final     Comment:     Performed at [place] [address]     TSH   Date Value Ref Range Status   05/04/2023 1.41 0.44 - 3.98 mIU/L Final     Comment:      TSH testing is performed using different testing    methodology at [facility] than at other    [facility] hospitals. Direct result comparisons should    only be made within the same method.       Vitamin B12   Date Value Ref Range Status   06/04/2020 922 211 -  946 PG/ML Final     Comment:     Deficient=<174 pg/ml   Dargskkjbeeat=238-052 pg/ml  Performed at [address] [address]       Vitamin B-12   Date Value Ref Range Status   01/05/2021 990 (H) 211 - 911 pg/mL Final     Vitamin D, 25-Hydroxy   Date Value Ref Range Status   01/05/2021 50 ng/mL Final     Comment:     .  DEFICIENCY:         < 20   NG/ML  INSUFFICIENCY:      20-29  NG/ML  SUFFICIENCY:         NG/ML    THIS ASSAY ACCURATELY QUANTIFIES THE SUM OF  VITAMIN D3, 25-HYDROXY AND VIT D2,25-HYDROXY.       No results found for this or any previous visit (from the past 24 hour(s)).    Recent Imaging Results  XR lumbar spine 2-3 views  Narrative: STUDY:  Lumbar Spine Radiographs; 11/30/2023 at 8:21 PM  INDICATION:  Fracture.  COMPARISON:  None available.  ACCESSION NUMBER(S):  [id number]  ORDERING CLINICIAN:  STEPHANY PAEZ  TECHNIQUE:  3 view(s) of the lumbar spine.  FINDINGS:    AP alignment is near-anatomic.  There is osteopenia.  There is  redemonstration of compression fracture at L1 with anterior wedging  which appears grossly stable.  There is disc space narrowing and mild  endplate degenerative changes with minimal anterolisthesis L4 on L5  which appears stable.  Visualized portions of the sacrum and pelvis  are intact with mild arthrosis of the sacroiliac joints.  There are  postsurgical changes left hip.  There are atherosclerotic changes of  the abdominal aorta.  Impression: Redemonstration of compression fracture at L1.  Degenerative changes  which appear similar to prior CT.  Osteopenia.  Signed by Se Ortiz, DO  CT lumbar spine wo IV contrast  Narrative: STUDY:  CT Thoracic Spine and Lumbar Spine without IV Contrast; 11/30/2023  6:45 PM   INDICATION:  Trauma.   COMPARISON:  None available.   ACCESSION NUMBER(S):  [id number], [id number]  ORDERING CLINICIAN:  MIGUELITO LEÓN  TECHNIQUE:  CT of the thoracic spine and lumbar spine was performed  without  intravenous or intrathecal contrast.  Sagittal and coronal  reconstructions were generated.    Automated mA/kV exposure control was utilized and patient examination  was performed in strict accordance with principles of ALARA.  FINDINGS:  Thoracic spine:  The alignment is anatomic.  There is no fracture or traumatic  subluxation.  .  The vertebral body heights are well maintained.  There is a multilevel  degenerative disc space narrowing of the mid to lower thoracic spine..   There is confluent anterior osteophytosis/flowing ossification.  No  significant central canal stenosis is demonstrated.  The neural  foramina are patent throughout.    The paravertebral soft tissues are within normal limits.  The  visualized chest and abdomen are unremarkable.  Lumbar spine:  The alignment is anatomic.  There is a compression of the superior  endplate of the L1 vertebral body (216-42) which is new compared to  the prior abdominal pelvic CT from 2021.  There is approximately  15-20% loss of normal vertebral body height..  .  There is multilevel degenerative disc space narrowing of the lumbar  spine most prominently noted at the L1-L2 levels.  A vacuum disc  phenomenon is noted at the L4-L5 level.  There is a grade 1  anterolisthesis of L4-L5..  There is mild central canal stenosis with  associated disc bulge noted at the L3-L4 level.  There is a diffuse  disc bulge noted at the L4-L5 level with central canal stenosis and  bilateral neuroforamina at this level.  There is bilateral  neuroforaminal narrowing noted at the L3-L4, L4-L5 levels.  Narrowing.   There is a disc bulge noted the L5-S1 level.  There is mild bilateral  neuroforaminal narrowing.. .    The paravertebral soft tissues are within normal limits.  The  visualized abdomen is unremarkable.Coronary artery calcifications are  visualized  Impression: 1.  Superior endplate compression of the L1 vertebral body as  described above which is new finding compared to the prior  CT from  2021.  2.  The level degenerative disc disease of the lumbar spine as  described above.  3.  Grade 1 anterolisthesis of L4-L5 described above.  4.  Central canal stenosis is visualized at the.  L3-L4, L4-L5 levels.  5.  Multilevel bilateral neuroforamina narrowing of the lumbar spine  as outlined above.  6.  No definite acute thoracic vertebral body compression/fracture.  7.  Multilevel degenerative disc disease of the thoracic spine as  described above.  8. coronary artery calcifications.  Signed by Se Vidal MD  CT thoracic spine wo IV contrast  Narrative: STUDY:  CT Thoracic Spine and Lumbar Spine without IV Contrast; 11/30/2023  6:45 PM   INDICATION:  Trauma.   COMPARISON:  None available.   ACCESSION NUMBER(S):  [id number], [id number]  ORDERING CLINICIAN:  MIGUELITO LEÓN  TECHNIQUE:  CT of the thoracic spine and lumbar spine was performed  without intravenous or intrathecal contrast.  Sagittal and coronal  reconstructions were generated.    Automated mA/kV exposure control was utilized and patient examination  was performed in strict accordance with principles of ALARA.  FINDINGS:  Thoracic spine:  The alignment is anatomic.  There is no fracture or traumatic  subluxation.  .  The vertebral body heights are well maintained.  There is a multilevel  degenerative disc space narrowing of the mid to lower thoracic spine..   There is confluent anterior osteophytosis/flowing ossification.  No  significant central canal stenosis is demonstrated.  The neural  foramina are patent throughout.    The paravertebral soft tissues are within normal limits.  The  visualized chest and abdomen are unremarkable.  Lumbar spine:  The alignment is anatomic.  There is a compression of the superior  endplate of the L1 vertebral body (216-42) which is new compared to  the prior abdominal pelvic CT from 2021.  There is approximately  15-20% loss of normal vertebral body height..  .  There is multilevel degenerative disc  space narrowing of the lumbar  spine most prominently noted at the L1-L2 levels.  A vacuum disc  phenomenon is noted at the L4-L5 level.  There is a grade 1  anterolisthesis of L4-L5..  There is mild central canal stenosis with  associated disc bulge noted at the L3-L4 level.  There is a diffuse  disc bulge noted at the L4-L5 level with central canal stenosis and  bilateral neuroforamina at this level.  There is bilateral  neuroforaminal narrowing noted at the L3-L4, L4-L5 levels.  Narrowing.   There is a disc bulge noted the L5-S1 level.  There is mild bilateral  neuroforaminal narrowing.. .    The paravertebral soft tissues are within normal limits.  The  visualized abdomen is unremarkable.Coronary artery calcifications are  visualized  Impression: 1.  Superior endplate compression of the L1 vertebral body as  described above which is new finding compared to the prior CT from  2021.  2.  The level degenerative disc disease of the lumbar spine as  described above.  3.  Grade 1 anterolisthesis of L4-L5 described above.  4.  Central canal stenosis is visualized at the.  L3-L4, L4-L5 levels.  5.  Multilevel bilateral neuroforamina narrowing of the lumbar spine  as outlined above.  6.  No definite acute thoracic vertebral body compression/fracture.  7.  Multilevel degenerative disc disease of the thoracic spine as  described above.  8. coronary artery calcifications.  Signed by Se Vidal MD  CT chest abdomen pelvis w IV contrast  Narrative: STUDY:  CT Chest, Abdomen, and Pelvis with IV Contrast; 11/30/2023 at 11:29  AM.  INDICATION:  Fall and left sided abdominal pain.  COMPARISON:  XR chest 11/22/2022; CT pelvis 11/22/2022; XR pelvis 3/28/2022; XR  abdomen 3/9/2022; CT AP 7/12/2021.  ACCESSION NUMBER(S):  [id number]  ORDERING CLINICIAN:  STEPHANY PAEZ  TECHNIQUE:  CT of the chest, abdomen, and pelvis was performed.  Contiguous axial  images were obtained at 3 mm slice thickness through the chest,  abdomen,  and pelvis.  Coronal and sagittal reconstructions at 3 mm  slice thickness were performed.  Omnipaque 350 75 mL was administered  intravenously.    FINDINGS:  CHEST:  MEDIASTINUM:  The heart is normal in size without pericardial effusion.  Central  vascular structures opacify normally.    LUNGS/PLEURA:  There is no pleural effusion, pleural thickening, or pneumothorax.   The airways are patent.  Lungs are clear without consolidation, interstitial disease, or  suspicious nodules.  LYMPH NODES:  Thoracic lymph nodes are not enlarged.  ABDOMEN:     LIVER:  No hepatomegaly.  Smooth surface contour.  Multiple small calcified  granulomata.     BILE DUCTS:  No intrahepatic or extrahepatic biliary ductal dilatation.     GALLBLADDER:  The gallbladder is distended but not inflamed.  No calcified  gallstones.  STOMACH:  Stomach is nondistended which would account for the wall thickening.   Small hiatal hernia.     PANCREAS:  No masses or ductal dilatation.     SPLEEN:  Multiple calcified granulomata.     ADRENAL GLANDS:  No thickening or nodules.     KIDNEYS AND URETERS:  Kidneys are normal in size and location.  No renal or ureteral  calculi.     PELVIS:     BLADDER:  Posterior wall thickening likely related to the pelvic abscess further  described below.     REPRODUCTIVE ORGANS:  Uterus is absent.  Stable 2.6 cm left ovarian cyst.     BOWEL:  6.6 x 5.0 cm gas and fluid collection noted within the pelvis  posterior to the urinary bladder and adjacent to the thick-walled  sigmoid colon.  Collection may represent abscess related to enteritis  left-sided colon which may represent inflammatory or ischemic bowel.     VESSELS:  No abnormalities identified.  Abdominal aorta is normal in caliber.      PERITONEUM/RETROPERITONEUM/LYMPH NODES:  No free fluid.  No pneumoperitoneum.  No lymphadenopathy.     ABDOMINAL WALL:  No abnormalities identified.  SOFT TISSUES:   No abnormalities identified.     BONES:  Acute fracture of the  superior aspect of the L1 vertebral body noted  with approximately 50% loss of normal involvement of the pedicles or  lamina.  Impression: 1.6.6 x 5.0 cm gas and fluid collection within the pelvis  consistent with an abscess likely related to underlying acute  diverticulitis.  Wall thickening of the left-side of the colon  suggesting inflammatory or ischemic bowel disease  2.Acute fracture of the L1 vertebral body.  3.No acute intrathoracic abnormality.  4.Evidence of old granulomatous disease.  5.Small hiatal hernia.  Signed by Dion Martin MD  CT cervical spine wo IV contrast  Narrative: Interpreted By:  Judd Eastman,   STUDY:  CT CERVICAL SPINE WO IV CONTRAST;  11/30/2023 11:29 am      INDICATION:  Signs/Symptoms:fall.      COMPARISON:  None.      ACCESSION NUMBER(S):  [id number]      ORDERING CLINICIAN:  STEPHANY PAEZ      TECHNIQUE:  Axial CT images of the cervical spine are obtained. Axial, coronal  and sagittal reconstructions are provided for review.      FINDINGS:          Fractures: There is no evidence for an acute fracture of the cervical  spine.      Vertebral Alignment: No posttraumatic malalignment. 5 mm  anterolisthesis of C3 on C4, increased since previous examination  when it measured 3 mm.      Craniocervical Junction: The odontoid process and craniocervical  junction are intact.      Vertebrae/Disc Spaces:  Multilevel disc space narrowing. Multilevel  facet arthropathy Multilevel uncovertebral hypertrophy.Multilevel  osteophyte formation.      Prevertebral/Paraspinal Soft Tissues: The prevertebral and paraspinal  soft tissues are unremarkable.          Impression: Multilevel spondylosis without acute osseous abnormality of the  cervical spine.      Interval increased anterolisthesis of C3 on C4 which now measures 5  mm, previously measuring 3 mm on 11/22/2022. MRI may be obtained for  further assessment of underlying ligamentous injury given the  interval change.      MACRO:  None       Signed by: Judd Eastman 11/30/2023 11:49 AM  Dictation workstation:   [id number]  CT head wo IV contrast  Narrative: Interpreted By:  Judd Eastman,   STUDY:  CT HEAD WO IV CONTRAST;  11/30/2023 11:29 am      INDICATION:  fall.      COMPARISON:  Head CT 11/22/2022      ACCESSION NUMBER(S):  [id number]      ORDERING CLINICIAN:  STEPHANY PAEZ      TECHNIQUE:  Noncontrast axial CT scan of head was performed. Angled reformats in  brain and bone windows were generated. The images were reviewed in  bone, brain, blood and soft tissue windows.      FINDINGS:  CSF Spaces: The ventricles, sulci and basal cisterns are within  normal limits. There is no extraaxial fluid collection.      Parenchyma: Confluent periventricular and subcortical white matter  hypoattenuation, nonspecific, likely reflecting chronic microvascular  ischemic changes. Old appearing lacunar type infarcts of the  bilateral basal ganglia. The grey-white differentiation is intact.  There is no mass effect or midline shift.  There is no intracranial  hemorrhage.      Calvarium: No acute displaced calvarial fracture.      Paranasal sinuses and mastoids: Mild bilateral ethmoid opacification.  Mastoid air cells appear clear.      Impression: No evidence of acute cortical infarct or intracranial hemorrhage.              MACRO:  None          Signed by: Judd Eastman 11/30/2023 11:46 AM  Dictation workstation:   [id number]    XR lumbar spine 2-3 views    Result Date: 11/30/2023  STUDY: Lumbar Spine Radiographs; 11/30/2023 at 8:21 PM INDICATION: Fracture. COMPARISON: None available. ACCESSION NUMBER(S): [id number] ORDERING CLINICIAN: STEPHANY PAEZ TECHNIQUE:  3 view(s) of the lumbar spine. FINDINGS:  AP alignment is near-anatomic.  There is osteopenia.  There is redemonstration of compression fracture at L1 with anterior wedging which appears grossly stable.  There is disc space narrowing and mild endplate degenerative changes with minimal  anterolisthesis L4 on L5 which appears stable.  Visualized portions of the sacrum and pelvis are intact with mild arthrosis of the sacroiliac joints.  There are postsurgical changes left hip.  There are atherosclerotic changes of the abdominal aorta.    Redemonstration of compression fracture at L1.  Degenerative changes which appear similar to prior CT.  Osteopenia. Signed by Se Ortiz, DO    CT thoracic spine wo IV contrast    Result Date: 11/30/2023  STUDY: CT Thoracic Spine and Lumbar Spine without IV Contrast; 11/30/2023 6:45 PM INDICATION: Trauma. COMPARISON: None available. ACCESSION NUMBER(S): [id number], [id number] ORDERING CLINICIAN: MIGUELITO LEÓN TECHNIQUE:  CT of the thoracic spine and lumbar spine was performed without intravenous or intrathecal contrast.  Sagittal and coronal reconstructions were generated.  Automated mA/kV exposure control was utilized and patient examination was performed in strict accordance with principles of ALARA. FINDINGS: Thoracic spine: The alignment is anatomic.  There is no fracture or traumatic subluxation.  . The vertebral body heights are well maintained.  There is a multilevel degenerative disc space narrowing of the mid to lower thoracic spine..  There is confluent anterior osteophytosis/flowing ossification.  No significant central canal stenosis is demonstrated.  The neural foramina are patent throughout.  The paravertebral soft tissues are within normal limits.  The visualized chest and abdomen are unremarkable. Lumbar spine: The alignment is anatomic.  There is a compression of the superior endplate of the L1 vertebral body (216-42) which is new compared to the prior abdominal pelvic CT from 2021.  There is approximately 15-20% loss of normal vertebral body height.. .  There is multilevel degenerative disc space narrowing of the lumbar spine most prominently noted at the L1-L2 levels.  A vacuum disc phenomenon is noted at the L4-L5 level.  There is a  grade 1 anterolisthesis of L4-L5..  There is mild central canal stenosis with associated disc bulge noted at the L3-L4 level.  There is a diffuse disc bulge noted at the L4-L5 level with central canal stenosis and bilateral neuroforamina at this level.  There is bilateral neuroforaminal narrowing noted at the L3-L4, L4-L5 levels.  Narrowing.  There is a disc bulge noted the L5-S1 level.  There is mild bilateral neuroforaminal narrowing.. .  The paravertebral soft tissues are within normal limits.  The visualized abdomen is unremarkable.Coronary artery calcifications are visualized    1.  Superior endplate compression of the L1 vertebral body as described above which is new finding compared to the prior CT from 2021. 2.  The level degenerative disc disease of the lumbar spine as described above. 3.  Grade 1 anterolisthesis of L4-L5 described above. 4.  Central canal stenosis is visualized at the.  L3-L4, L4-L5 levels. 5.  Multilevel bilateral neuroforamina narrowing of the lumbar spine as outlined above. 6.  No definite acute thoracic vertebral body compression/fracture. 7.  Multilevel degenerative disc disease of the thoracic spine as described above. 8. coronary artery calcifications. Signed by Se Vidal MD    CT lumbar spine wo IV contrast    Result Date: 11/30/2023  STUDY: CT Thoracic Spine and Lumbar Spine without IV Contrast; 11/30/2023 6:45 PM INDICATION: Trauma. COMPARISON: None available. ACCESSION NUMBER(S): [id number], [id number] ORDERING CLINICIAN: MIGUELITO LEÓN TECHNIQUE:  CT of the thoracic spine and lumbar spine was performed without intravenous or intrathecal contrast.  Sagittal and coronal reconstructions were generated.  Automated mA/kV exposure control was utilized and patient examination was performed in strict accordance with principles of ALARA. FINDINGS: Thoracic spine: The alignment is anatomic.  There is no fracture or traumatic subluxation.  . The vertebral body heights are well  maintained.  There is a multilevel degenerative disc space narrowing of the mid to lower thoracic spine..  There is confluent anterior osteophytosis/flowing ossification.  No significant central canal stenosis is demonstrated.  The neural foramina are patent throughout.  The paravertebral soft tissues are within normal limits.  The visualized chest and abdomen are unremarkable. Lumbar spine: The alignment is anatomic.  There is a compression of the superior endplate of the L1 vertebral body (216-42) which is new compared to the prior abdominal pelvic CT from 2021.  There is approximately 15-20% loss of normal vertebral body height.. .  There is multilevel degenerative disc space narrowing of the lumbar spine most prominently noted at the L1-L2 levels.  A vacuum disc phenomenon is noted at the L4-L5 level.  There is a grade 1 anterolisthesis of L4-L5..  There is mild central canal stenosis with associated disc bulge noted at the L3-L4 level.  There is a diffuse disc bulge noted at the L4-L5 level with central canal stenosis and bilateral neuroforamina at this level.  There is bilateral neuroforaminal narrowing noted at the L3-L4, L4-L5 levels.  Narrowing.  There is a disc bulge noted the L5-S1 level.  There is mild bilateral neuroforaminal narrowing.. .  The paravertebral soft tissues are within normal limits.  The visualized abdomen is unremarkable.Coronary artery calcifications are visualized    1.  Superior endplate compression of the L1 vertebral body as described above which is new finding compared to the prior CT from 2021. 2.  The level degenerative disc disease of the lumbar spine as described above. 3.  Grade 1 anterolisthesis of L4-L5 described above. 4.  Central canal stenosis is visualized at the.  L3-L4, L4-L5 levels. 5.  Multilevel bilateral neuroforamina narrowing of the lumbar spine as outlined above. 6.  No definite acute thoracic vertebral body compression/fracture. 7.  Multilevel degenerative disc  disease of the thoracic spine as described above. 8. coronary artery calcifications. Signed by Se Vidal MD    CT chest abdomen pelvis w IV contrast    Result Date: 11/30/2023  STUDY: CT Chest, Abdomen, and Pelvis with IV Contrast; 11/30/2023 at 11:29 AM. INDICATION: Fall and left sided abdominal pain. COMPARISON: XR chest 11/22/2022; CT pelvis 11/22/2022; XR pelvis 3/28/2022; XR abdomen 3/9/2022; CT AP 7/12/2021. ACCESSION NUMBER(S): [id number] ORDERING CLINICIAN: STEPHANY PAEZ TECHNIQUE: CT of the chest, abdomen, and pelvis was performed.  Contiguous axial images were obtained at 3 mm slice thickness through the chest, abdomen, and pelvis.  Coronal and sagittal reconstructions at 3 mm slice thickness were performed.  Omnipaque 350 75 mL was administered intravenously.  FINDINGS: CHEST: MEDIASTINUM: The heart is normal in size without pericardial effusion.  Central vascular structures opacify normally.  LUNGS/PLEURA: There is no pleural effusion, pleural thickening, or pneumothorax. The airways are patent. Lungs are clear without consolidation, interstitial disease, or suspicious nodules. LYMPH NODES: Thoracic lymph nodes are not enlarged. ABDOMEN:  LIVER: No hepatomegaly.  Smooth surface contour.  Multiple small calcified granulomata.  BILE DUCTS: No intrahepatic or extrahepatic biliary ductal dilatation.  GALLBLADDER: The gallbladder is distended but not inflamed.  No calcified gallstones. STOMACH: Stomach is nondistended which would account for the wall thickening. Small hiatal hernia.  PANCREAS: No masses or ductal dilatation.  SPLEEN: Multiple calcified granulomata.  ADRENAL GLANDS: No thickening or nodules.  KIDNEYS AND URETERS: Kidneys are normal in size and location.  No renal or ureteral calculi.  PELVIS:  BLADDER: Posterior wall thickening likely related to the pelvic abscess further described below.  REPRODUCTIVE ORGANS: Uterus is absent.  Stable 2.6 cm left ovarian cyst.  BOWEL: 6.6 x 5.0 cm  gas and fluid collection noted within the pelvis posterior to the urinary bladder and adjacent to the thick-walled sigmoid colon.  Collection may represent abscess related to enteritis left-sided colon which may represent inflammatory or ischemic bowel.  VESSELS: No abnormalities identified.  Abdominal aorta is normal in caliber.  PERITONEUM/RETROPERITONEUM/LYMPH NODES: No free fluid.  No pneumoperitoneum. No lymphadenopathy.  ABDOMINAL WALL: No abnormalities identified. SOFT TISSUES: No abnormalities identified.  BONES: Acute fracture of the superior aspect of the L1 vertebral body noted with approximately 50% loss of normal involvement of the pedicles or lamina.    1.6.6 x 5.0 cm gas and fluid collection within the pelvis consistent with an abscess likely related to underlying acute diverticulitis.  Wall thickening of the left-side of the colon suggesting inflammatory or ischemic bowel disease 2.Acute fracture of the L1 vertebral body. 3.No acute intrathoracic abnormality. 4.Evidence of old granulomatous disease. 5.Small hiatal hernia. Signed by Dion Martin MD    CT cervical spine wo IV contrast    Result Date: 11/30/2023  Interpreted By:  Judd Eastman, STUDY: CT CERVICAL SPINE WO IV CONTRAST;  11/30/2023 11:29 am   INDICATION: Signs/Symptoms:fall.   COMPARISON: None.   ACCESSION NUMBER(S): [id number]   ORDERING CLINICIAN: STEPHANY PAEZ   TECHNIQUE: Axial CT images of the cervical spine are obtained. Axial, coronal and sagittal reconstructions are provided for review.   FINDINGS:     Fractures: There is no evidence for an acute fracture of the cervical spine.   Vertebral Alignment: No posttraumatic malalignment. 5 mm anterolisthesis of C3 on C4, increased since previous examination when it measured 3 mm.   Craniocervical Junction: The odontoid process and craniocervical junction are intact.   Vertebrae/Disc Spaces:  Multilevel disc space narrowing. Multilevel facet arthropathy Multilevel uncovertebral  hypertrophy.Multilevel osteophyte formation.   Prevertebral/Paraspinal Soft Tissues: The prevertebral and paraspinal soft tissues are unremarkable.         Multilevel spondylosis without acute osseous abnormality of the cervical spine.   Interval increased anterolisthesis of C3 on C4 which now measures 5 mm, previously measuring 3 mm on 11/22/2022. MRI may be obtained for further assessment of underlying ligamentous injury given the interval change.   MACRO: None   Signed by: Judd Eastman 11/30/2023 11:49 AM Dictation workstation:   [id number]    CT head wo IV contrast    Result Date: 11/30/2023  Interpreted By:  Judd Eastman, STUDY: CT HEAD WO IV CONTRAST;  11/30/2023 11:29 am   INDICATION: fall.   COMPARISON: Head CT 11/22/2022   ACCESSION NUMBER(S): [id number]   ORDERING CLINICIAN: STEPHANY PAEZ   TECHNIQUE: Noncontrast axial CT scan of head was performed. Angled reformats in brain and bone windows were generated. The images were reviewed in bone, brain, blood and soft tissue windows.   FINDINGS: CSF Spaces: The ventricles, sulci and basal cisterns are within normal limits. There is no extraaxial fluid collection.   Parenchyma: Confluent periventricular and subcortical white matter hypoattenuation, nonspecific, likely reflecting chronic microvascular ischemic changes. Old appearing lacunar type infarcts of the bilateral basal ganglia. The grey-white differentiation is intact. There is no mass effect or midline shift.  There is no intracranial hemorrhage.   Calvarium: No acute displaced calvarial fracture.   Paranasal sinuses and mastoids: Mild bilateral ethmoid opacification. Mastoid air cells appear clear.       No evidence of acute cortical infarct or intracranial hemorrhage.       MACRO: None     Signed by: Judd Eastman 11/30/2023 11:46 AM Dictation workstation:   [id number]      Head/Brain Imaging  === Results for orders placed during the hospital encounter of 11/30/23 ===    CT head wo IV  contrast [EUH233] 11/30/2023    Status: Normal  No evidence of acute cortical infarct or intracranial hemorrhage.        MACRO:  None      Signed by: Judd Eastman 11/30/2023 11:46 AM  Dictation workstation:   [id number]  No results found for this or any previous visit.        DATA:  EKG:  ms  Anti-psychotics in 48 hours: none  Opioids/Benzodiazepines in 48 hours: 1x fentanyl and midazolam for IR procedure 12/4  Anticholinergics on board:No  Restraints:No  Indwelling catheters:No  Last BM: 12/3  UO in 24 hours: 675 ml urine output (net -ve 115 ml in last 24 hours)  Activity in the past 24 hours: PT/OT  Need for ambulatory devices: walker    Assessment/Plan   This is a/an 89 y.o. year old female, with past medical history relevant for depression, AS s/p AVR (2014), HTN, HLD, RA, hypothyroidism, GERD, recurrent falls admitted on 11/30 after a fall c/b acute L1 vertebral body fracture, being managed nonoperatively. Hospital course notable for acute diverticulitis c/b pelvic abscess, on Zosyn and s/p IR-guided drain placement 12/4. Geriatrics is consulted for elderly trauma.     Principal Problem:    Diverticulitis    #Acute unwitnessed fall, unclear mechanism of injury, with resultant L1 vertebral body fracture  :: Most recent vitamin D in 2021: 50  :: PT recommending moderate intensity level of continued care  Plan:  Please check vitamin D, as low levels have been associated with falls and would need to replete if low  Please check orthostatic vitals to r/o contributing factor to fall    #Acute L1 vertebral body fracture  :: Being managed nonoperatively  :: Most recent DEXA 2015: normal  :: Patient rating pain 0/10 on assessment  Plan:  C/w pain regimen as ordered: tylenol TID scheduled and lidocaine patch  May need DEXA scan as outpatient to evaluate for osteopenia/osteoporosis especially I/s/o long-term PPI use    #Suspected underlying mild-moderate dementia  :: No formal diagnosis, however patient on  "donepezil and memantine. Donepezil started in August 2023 due to concern for \"memory loss\"  :: BADLs: requires assistance in bathing, dependent on adult diapers for urinary incontinence  :: IADLs: relies on assisted living facility for shopping/cooking//transportation/meds/finances but able to use a phone independently  :: CAM -ve, 4AT score 3 today although likely confounded by language barrier (English is 2nd language) and recent versed/fentanyl for IR procedure today.  :: Patient does have hx depression which may mimic dementia. Other mimickers include hypothyroidism (TSH last month 6.59(H), on synthroid), vitamin B12 deficiency (last checked 2021)  :: No current concern for delirium, however patient is at high risk given age, fracture, suspected underlying cognitive impairment, and hospitalization  Plan:  C/w donepezil and memantine as prescribed  Patient's son agreeable to continuing these medications for now, pending conversation with AL psychiatrist and PCP after discharge  Please check vitamin B12, as deficiency may mimic dementia  Please consider the following general measures for minimizing delirium in a hospitalized patient:   -Bright lights during the day, keeps blinds up, switch all lights on   -avoid disturbances at night. Encourage at least 6 hours uninterrupted sleep. Consider d/c 4am vitals check  -avoid benzodiazepines, sedatives. Minimize opioids   -avoid anti-cholinergics    -avoid restraints. D/c camacho if possible.   - if requiring no to little insulin coverage, d/c sliding scale  -use low dose haldol 0.5mg PO (IM if PO not possible) only PRN severe agitation where pt exhibits volatile behavior and is a threat to self or others. EKGs to monitor QTc   -daily orientation to time and place by the staff   -out of bed to chair few hours everyday  - encourage stimulating activities during the day if possible     #Polypharmacy  :: Per pharmacy med rec, patient on many high risk " medications including antihistamines, trazodone, combination PPI + H2 blocker  Plan:  Agree with holding home trazodone, would discontinue melatonin as well as patient is on a high dose and do not want to make her overly sleepy. Can make melatonin prn    Agree with holding home loratadine as antihistamine medications can cause confusion in the elderly  Agree with holding home omeprazole and continuing famotidine monotherapy  Requested updated medication list from [facility], pending receipt     #Advanced care planning  #Code status  :: HCPOA is son Luis (documents scanned into EMR)  :: Per conversation 12/4 with son/HCPOA, patient and son would NOT want resuscitative measures. Clearly mentioned no CPR, no intubation.  Plan:  Please update code status to DNR/DNI    Medication Evaluation:   High risk medications: gabapentin, famotidine, mirtazapine    Geriatric medicine will continue to follow the patient. Thank you for allowing geriatric medicine to be involved in the care of your patient. Geriatric medicine consultation team is available during work hours Monday through Friday. For any emergency issues requiring immediate assistance over the weekend, please page Geriatrics pager [phone]      Consult Billing Time  Prep time on date of patient encounter(minutes):  Time directly with patient/family/caregiver(minutes):  Documentation time(minutes):  TOTAL TIME(minutes):    Patient seen and discussed with Dr. Spear who agrees with the plan as outlined above.    Rachell Dang MD

## 2023-12-04 NOTE — CARE PLAN
The patient's goals for the shift include       Problem: Safety  Goal: Patient will be injury free during hospitalization  Outcome: Progressing  Goal: I will remain free of falls  Outcome: Progressing        The clinical goals for the shift include pt will be free from falls

## 2023-12-04 NOTE — PROGRESS NOTES
Physical Therapy                 Therapy Communication Note    Patient Name: Amena Chapa  MRN: [id number]  Today's Date: 12/4/2023     Discipline: Physical Therapy    Missed Visit Reason: Missed Visit Reason: Patient in a medical procedure (pt off floor. will re-attempt as schedule permits.)    Missed Time: Attempt

## 2023-12-05 NOTE — PROGRESS NOTES
[facility]  TRAUMA SERVICE - PROGRESS NOTE    Patient Name: Amena Chapa  MRN: [id number]  Admit Date: 1130  : 1933  AGE: 89 y.o.   GENDER: female  ==============================================================================  MECHANISM OF INJURY:   89 y/o female present s/p unwitnessed fall at SNF.     LOC (yes/no?): Unknown   Anticoagulant / Anti-platelet Rx? (for what dx?): ASA  Referring Facility Name (N/A for scene EMR run): Arrived via EMS    INJURIES:   L1 Vert Body Fx   Pelvic Abscess secondary to Acute diverticulitis    OTHER MEDICAL PROBLEMS:  HTN  HLD  RA  GERD  Aortic Stenosis  CAD w/ stent on ASA   Dementia A/O X 2 Baseline    INCIDENTAL FINDINGS:  Hiatal Hernia     PROCEDURES:   - IR drain placement for fluid collection adjacent to bladder    ==============================================================================  TODAY'S ASSESSMENT AND PLAN OF CARE:  ##L1 Vert Body Fx  -Ortho Spine consulted aprec recs        ->No acute orthopaedic intervention. Admit to Trauma for PT.       -> WB: No restrictions       -> Pain meds per primary team/ED       -> Antibiotics: None from orthopaedic standpoint        -> Diet: Okay for diet from orthopaedic standpoint  -Multimodal w/ Sched Tylenol, Oxy 2.5/5 PRN  -PT/OT eval     ##Pelvic Abscess 2/2 Diverticulitis   -IR Consulted for Drain placement aprec recs       ->IR placed KATHRYN drain on , thick purulent drainage in bulb  -Cont IV ABX Zosyn day 3/7    -> follow up drain cultures sent by IR  - Blood cultures/Urine culture, NGTD x2 days  - Will need colonoscopy and colorectal follow up after discharge (to be scheduled)  - Will require drain for approximately 2 weeks    ##Comorbidity: HTN, HLD, RA, GERD, Aortic Stenosis, CAD, Dementia   -Cont home Amlodipine, Aricept, Cymbalta, Pepcid, Gabapentin, Synthroid, Melatonin, Mirtazapine, and Xalatan  -Holding Trazodone   - Per geriatrics consult, melatonin switched  to prn, code status DNR/DNI after discussion with patient POA, orthostatics ordered and B12, Vitamin D level ordered  - Avoid QT prolonging agents due to prolonged QT    ##FEN/GI/: Pre-hospital UTI   - Starting clear liquid diet, will progress if tolerating  -mIVF   -Q4Hr I/O's   -Check and replace lytes as indicated     -Hypokalemia/hyupomagnesmeia on 12/3 --> corrected   -> RFP in AM  -No indication for [place]ant     ##PPX:  -SCD's  -Lovenox restarted 12/4    Dispo: Continued nursing floor care, pain control, ongoing PT/OT/SW. Awaiting S/C from pelvic drain.      Pt. Seen and plan discussed with Dr. Hayley Chang, [facility]  Trauma Surgery  [id number]    Total face to face time spent with patient of 35 minutes, with >50% of the time spent discussing plan of care/management, counseling/educating on disease processes, explaining results of diagnostic testing.      ==============================================================================  CHIEF COMPLAINT / OVERNIGHT EVENTS:   Pt seen post IR drain placement. Patient complaining of abdominal upset and discomfort.       MEDICAL HISTORY / ROS:  Admission history and ROS reviewed. Pertinent changes as follows: NONE      PHYSICAL EXAM:  Heart Rate:  [64-70]   Temp:  [36.5 °C (97.7 °F)-37.3 °C (99.1 °F)]   Resp:  [14-17]   BP: (128-147)/(55-75)   SpO2:  [93 %-99 %]     Physical Exam  Constitutional:       Appearance: She is not ill-appearing or toxic-appearing.      Comments: Pt in bed appearing uncomfortable, endorsing abdominal pain   HENT:      Head: Normocephalic.      Nose: No congestion or rhinorrhea.      Mouth/Throat:      Mouth: Mucous membranes are moist.      Pharynx: No oropharyngeal exudate or posterior oropharyngeal erythema.      Comments: Age related dental decay  Eyes:      General: No scleral icterus.     Pupils: Pupils are equal, round, and reactive to light.   Cardiovascular:      Rate and Rhythm: Normal rate and regular rhythm.      Pulses:  Normal pulses.      Heart sounds: Normal heart sounds. No murmur heard.     No gallop.   Pulmonary:      Effort: Pulmonary effort is normal. No respiratory distress.      Breath sounds: Normal breath sounds. No wheezing or rales.      Comments: Speaks in full sentences, no overnight oxygen requirements  Chest:      Chest wall: No tenderness.   Abdominal:      General: Bowel sounds are normal.      Palpations: Abdomen is soft.      Tenderness: There is abdominal tenderness.      Comments: +BM this admission. Patient complaining of slight global abdominal tenderness on palpation. Abdomen not distended, no masses or gross organomegaly   Musculoskeletal:         General: No swelling, tenderness or signs of injury.      Cervical back: Normal range of motion. No rigidity or tenderness.   Skin:     General: Skin is warm and dry.      Capillary Refill: Capillary refill takes less than 2 seconds.      Coloration: Skin is not jaundiced.      Findings: No erythema or rash.      Comments: Age related skin changes   Neurological:      Mental Status: Mental status is at baseline. She is disoriented.      Comments: A&Ox2, GCS 14 (baseline)   Psychiatric:         Mood and Affect: Mood normal.      IMAGING SUMMARY:   Please refer to imaging studies for all historical imaging and interpretations.     I have reviewed all medications, laboratory results, and imaging pertinent for today's encounter.

## 2023-12-05 NOTE — PROGRESS NOTES
Subjective   NAEO. Patient seen and examined at bedside this AM, AO to person/place. Denies pain, difficulty breathing. Does not enjoy lying in the hospital bed which she states is not very comfortable. Will encourage OOB to chair.        Objective       Current Facility-Administered Medications:     acetaminophen (Tylenol) tablet 975 mg, 975 mg, oral, q8h, Adams Galvan PA-C, 975 mg at 12/05/23 0046    amLODIPine (Norvasc) tablet 10 mg, 10 mg, oral, Daily, Adams Galvan PA-C, 10 mg at 12/05/23 0952    donepezil (Aricept) tablet 10 mg, 10 mg, oral, Nightly, Adams Galvan PA-C, 10 mg at 12/04/23 2214    DULoxetine (Cymbalta) DR capsule 30 mg, 30 mg, oral, BID, Adams Galvan PA-C, 30 mg at 12/05/23 0949    enoxaparin (Lovenox) syringe 30 mg, 30 mg, subcutaneous, q12h, Adams Galvan PA-C, 30 mg at 12/05/23 0948    famotidine (Pepcid) tablet 10 mg, 10 mg, oral, Nightly, Adams Galvan PA-C, 10 mg at 12/04/23 2215    gabapentin (Neurontin) capsule 300 mg, 300 mg, oral, Nightly, Adams Galvan PA-C, 300 mg at 12/04/23 2216    haloperidol lactate (Haldol) injection 0.5 mg, 0.5 mg, intravenous, q4h PRN, MISTY Bryant-CNP    lactated Ringer's infusion, 75 mL/hr, intravenous, Continuous, MISTY Bryant-CNP, Last Rate: 75 mL/hr at 12/03/23 0147, 75 mL/hr at 12/03/23 0147    lactated Ringer's infusion, 50 mL/hr, intravenous, Continuous, Tye Chang, MISTY-CNP, Last Rate: 50 mL/hr at 12/04/23 2232, 50 mL/hr at 12/04/23 2232    latanoprost (Xalatan) 0.005 % ophthalmic solution 1 drop, 1 drop, Both Eyes, Nightly, Adams Galvan PA-C, 1 drop at 12/04/23 2214    levothyroxine (Synthroid, Levoxyl) tablet 88 mcg, 88 mcg, oral, Daily before breakfast, Adams Galvan PA-C, 88 mcg at 12/05/23 0949    lidocaine 4 % patch 1 patch, 1 patch, transdermal, Daily, Franko Beauchamp, APRN-CNP, 1 patch at 12/03/23 1352    melatonin tablet 5 mg, 5 mg, oral, Nightly PRN, Tye Chang, APRN-CNP, 5 mg at 12/04/23  2215    memantine (Namenda) tablet 10 mg, 10 mg, oral, Nightly, Adams Galvan PA-C, 10 mg at 12/04/23 2214    mirtazapine (Remeron) tablet 15 mg, 15 mg, oral, Nightly, Adams Galvan PA-C, 15 mg at 12/04/23 2215    naloxone (Narcan) injection 0.2 mg, 0.2 mg, intravenous, q5 min PRN, Adams Galvan PA-C    ondansetron (Zofran) tablet 4 mg, 4 mg, oral, q8h PRN **OR** ondansetron (Zofran) injection 4 mg, 4 mg, intravenous, q8h PRN, MACHO Bryant    oxyCODONE (Roxicodone) immediate release tablet 5 mg, 5 mg, oral, q4h PRN, MACHO Bryant, 5 mg at 12/01/23 1623    oxygen (O2) therapy, , inhalation, Continuous - 02/gases, Adams Galvan PA-C, Start at 12/05/23 0800    piperacillin-tazobactam-dextrose (Zosyn) IV 3.375 g, 3.375 g, intravenous, q6h, Lazarus Najera MD, Last Rate: 100 mL/hr at 12/05/23 0951, 3.375 g at 12/05/23 0951    sennosides-docusate sodium (Dawna-Colace) 8.6-50 mg per tablet 2 tablet, 2 tablet, oral, BID, Adams Galvan PA-C, 2 tablet at 12/04/23 2217    [Held by provider] traZODone (Desyrel) tablet 50 mg, 50 mg, oral, Nightly, Adams Galvan PA-C    Physical Exam:  Constitutional:       General: She is not in acute distress.     Appearance: Normal appearance. She is not ill-appearing or diaphoretic.   HENT:      Head: Normocephalic and atraumatic.      Right Ear: External ear normal.      Left Ear: External ear normal.      Nose: Nose normal.      Mouth/Throat:      Mouth: Mucous membranes are moist.      Pharynx: Oropharynx is clear.   Eyes:      General: No scleral icterus.     Extraocular Movements: Extraocular movements intact.      Conjunctiva/sclera: Conjunctivae normal.   Cardiovascular:      Rate and Rhythm: Normal rate and regular rhythm.      Pulses: Normal pulses.      Heart sounds: Normal heart sounds. No murmur heard.     No friction rub. No gallop.   Pulmonary:      Effort: Pulmonary effort is normal. No respiratory distress.      Breath sounds: Normal  breath sounds. No wheezing, rhonchi or rales.   Abdominal:      General: Abdomen is flat. Bowel sounds are normal. There is no distension.      Palpations: Abdomen is soft.      Tenderness: There is no abdominal tenderness. There is no guarding or rebound.      Comments: abdominal drain collecting serosanguinous output, site appears clean w/o s/s infection  Musculoskeletal:         General: No swelling. Normal range of motion.      Right lower leg: No edema.      Left lower leg: No edema.   Skin:     General: Skin is warm and dry.      Coloration: Skin is not jaundiced.      Findings: No lesion or rash.   Neurological:      General: No focal deficit present.      Mental Status: She is alert.      Comments: AO to person/place.      Confusion Assessment Method(CAM) for diagnosis of delirium:    1.  Acute onset or fluctuating course: absent/present: Absent  2.  Inattention: absent/present: Absent  3.  Disorganized thinking: absent/present: Absent  4.  Altered level of consciousness: absent/present: Absent  CAM: negative    AT Score For Assessment of Delirium and Cognitive Impairment:  (Per 4AT from 12/4)  Alertness: 0  Normal(fully alert,but not agitated, throughout assessment)=0  Mild sleepiness for <10 seconds after walking, then normal=0  Clearly abnormal=4  2.  AMT4: 2  No mistakes=0  One mistake=1  Two or more mistakes/untestable=2  3.  Attention: 1  Achieves seven months or more correctly=0  Starts but scores <7 months/ refuses to start=1  Untestable(cannot start because unwell, drowsy, inattentive)=2  4.  Acute: 0  No=0  Yes=4    Total Score: 3  4 or above: Possible delirium +/- cognitive impairment  1-3: Possible cognitive impairment  0: Delirium or severe cognitive impairment unlikely(but delirium still possible if (4) information incomplete)      Visit Vitals  /75 (BP Location: Right arm, Patient Position: Lying)   Pulse 70   Temp 37.3 °C (99.1 °F) (Temporal)   Resp 16   Ht 1.524 m (5')   Wt 54 kg (119  lb 0.8 oz)   SpO2 98%   BMI 23.25 kg/m²   Smoking Status Never   BSA 1.51 m²      Last Recorded Vitals      12/4/2023    12:15 PM 12/4/2023    12:20 PM 12/4/2023    12:25 PM 12/4/2023     1:05 PM 12/4/2023     8:38 PM 12/4/2023    11:40 PM 12/5/2023     7:59 AM   Vitals   Systolic 146 121 131 134 147 128 137   Diastolic 58 58 60 54 62 55 75   Heart Rate 61 61 62 63 65 69 70   Temp     36.8 °C (98.2 °F) 37 °C (98.6 °F) 37.3 °C (99.1 °F)   Resp 14 14 14 14 14 14 16      Vitals:    11/30/23 1618   Weight: 54 kg (119 lb 0.8 oz)        Relevant Results  Results for orders placed or performed during the hospital encounter of 11/30/23 (from the past 24 hour(s))   Sterile Fluid Culture/Smear    Specimen: Intra-abdominal Abscess; Fluid   Result Value Ref Range    Gram Stain (4+) Abundant Polymorphonuclear leukocytes     Gram Stain (4+) Abundant Mixed Gram positive bacteria    Vitamin D 25-Hydroxy,Total (for eval of Vitamin D levels)   Result Value Ref Range    Vitamin D, 25-Hydroxy, Total 46 30 - 100 ng/mL   Vitamin B12   Result Value Ref Range    Vitamin B12 1,961 (H) 211 - 911 pg/mL   Renal function panel   Result Value Ref Range    Glucose 85 74 - 99 mg/dL    Sodium 139 136 - 145 mmol/L    Potassium 3.8 3.5 - 5.3 mmol/L    Chloride 96 (L) 98 - 107 mmol/L    Bicarbonate 32 21 - 32 mmol/L    Anion Gap 15 10 - 20 mmol/L    Urea Nitrogen 8 6 - 23 mg/dL    Creatinine 0.72 0.50 - 1.05 mg/dL    eGFR 80 >60 mL/min/1.73m*2    Calcium 8.6 8.6 - 10.6 mg/dL    Phosphorus 4.1 2.5 - 4.9 mg/dL    Albumin 2.8 (L) 3.4 - 5.0 g/dL   CBC   Result Value Ref Range    WBC 6.7 4.4 - 11.3 x10*3/uL    nRBC 0.0 0.0 - 0.0 /100 WBCs    RBC 3.64 (L) 4.00 - 5.20 x10*6/uL    Hemoglobin 9.2 (L) 12.0 - 16.0 g/dL    Hematocrit 29.9 (L) 36.0 - 46.0 %    MCV 82 80 - 100 fL    MCH 25.3 (L) 26.0 - 34.0 pg    MCHC 30.8 (L) 32.0 - 36.0 g/dL    RDW 14.3 11.5 - 14.5 %    Platelets 302 150 - 450 x10*3/uL     XR lumbar spine 2-3 views    Result Date:  11/30/2023  STUDY: Lumbar Spine Radiographs; 11/30/2023 at 8:21 PM INDICATION: Fracture. COMPARISON: None available. ACCESSION NUMBER(S): [id number] ORDERING CLINICIAN: STEPHANY PAEZ TECHNIQUE:  3 view(s) of the lumbar spine. FINDINGS:  AP alignment is near-anatomic.  There is osteopenia.  There is redemonstration of compression fracture at L1 with anterior wedging which appears grossly stable.  There is disc space narrowing and mild endplate degenerative changes with minimal anterolisthesis L4 on L5 which appears stable.  Visualized portions of the sacrum and pelvis are intact with mild arthrosis of the sacroiliac joints.  There are postsurgical changes left hip.  There are atherosclerotic changes of the abdominal aorta.    Redemonstration of compression fracture at L1.  Degenerative changes which appear similar to prior CT.  Osteopenia. Signed by Se Ortiz, DO    CT thoracic spine wo IV contrast    Result Date: 11/30/2023  STUDY: CT Thoracic Spine and Lumbar Spine without IV Contrast; 11/30/2023 6:45 PM INDICATION: Trauma. COMPARISON: None available. ACCESSION NUMBER(S): [id number], [id number] ORDERING CLINICIAN: MIGUELITO LEÓN TECHNIQUE:  CT of the thoracic spine and lumbar spine was performed without intravenous or intrathecal contrast.  Sagittal and coronal reconstructions were generated.  Automated mA/kV exposure control was utilized and patient examination was performed in strict accordance with principles of ALARA. FINDINGS: Thoracic spine: The alignment is anatomic.  There is no fracture or traumatic subluxation.  . The vertebral body heights are well maintained.  There is a multilevel degenerative disc space narrowing of the mid to lower thoracic spine..  There is confluent anterior osteophytosis/flowing ossification.  No significant central canal stenosis is demonstrated.  The neural foramina are patent throughout.  The paravertebral soft tissues are within normal limits.  The  visualized chest and abdomen are unremarkable. Lumbar spine: The alignment is anatomic.  There is a compression of the superior endplate of the L1 vertebral body (216-42) which is new compared to the prior abdominal pelvic CT from 2021.  There is approximately 15-20% loss of normal vertebral body height.. .  There is multilevel degenerative disc space narrowing of the lumbar spine most prominently noted at the L1-L2 levels.  A vacuum disc phenomenon is noted at the L4-L5 level.  There is a grade 1 anterolisthesis of L4-L5..  There is mild central canal stenosis with associated disc bulge noted at the L3-L4 level.  There is a diffuse disc bulge noted at the L4-L5 level with central canal stenosis and bilateral neuroforamina at this level.  There is bilateral neuroforaminal narrowing noted at the L3-L4, L4-L5 levels.  Narrowing.  There is a disc bulge noted the L5-S1 level.  There is mild bilateral neuroforaminal narrowing.. .  The paravertebral soft tissues are within normal limits.  The visualized abdomen is unremarkable.Coronary artery calcifications are visualized    1.  Superior endplate compression of the L1 vertebral body as described above which is new finding compared to the prior CT from 2021. 2.  The level degenerative disc disease of the lumbar spine as described above. 3.  Grade 1 anterolisthesis of L4-L5 described above. 4.  Central canal stenosis is visualized at the.  L3-L4, L4-L5 levels. 5.  Multilevel bilateral neuroforamina narrowing of the lumbar spine as outlined above. 6.  No definite acute thoracic vertebral body compression/fracture. 7.  Multilevel degenerative disc disease of the thoracic spine as described above. 8. coronary artery calcifications. Signed by Se Vidal MD    CT lumbar spine wo IV contrast    Result Date: 11/30/2023  STUDY: CT Thoracic Spine and Lumbar Spine without IV Contrast; 11/30/2023 6:45 PM INDICATION: Trauma. COMPARISON: None available. ACCESSION NUMBER(S):  [id number], [id number] ORDERING CLINICIAN: MIGUELITO LEÓN TECHNIQUE:  CT of the thoracic spine and lumbar spine was performed without intravenous or intrathecal contrast.  Sagittal and coronal reconstructions were generated.  Automated mA/kV exposure control was utilized and patient examination was performed in strict accordance with principles of ALARA. FINDINGS: Thoracic spine: The alignment is anatomic.  There is no fracture or traumatic subluxation.  . The vertebral body heights are well maintained.  There is a multilevel degenerative disc space narrowing of the mid to lower thoracic spine..  There is confluent anterior osteophytosis/flowing ossification.  No significant central canal stenosis is demonstrated.  The neural foramina are patent throughout.  The paravertebral soft tissues are within normal limits.  The visualized chest and abdomen are unremarkable. Lumbar spine: The alignment is anatomic.  There is a compression of the superior endplate of the L1 vertebral body (216-42) which is new compared to the prior abdominal pelvic CT from 2021.  There is approximately 15-20% loss of normal vertebral body height.. .  There is multilevel degenerative disc space narrowing of the lumbar spine most prominently noted at the L1-L2 levels.  A vacuum disc phenomenon is noted at the L4-L5 level.  There is a grade 1 anterolisthesis of L4-L5..  There is mild central canal stenosis with associated disc bulge noted at the L3-L4 level.  There is a diffuse disc bulge noted at the L4-L5 level with central canal stenosis and bilateral neuroforamina at this level.  There is bilateral neuroforaminal narrowing noted at the L3-L4, L4-L5 levels.  Narrowing.  There is a disc bulge noted the L5-S1 level.  There is mild bilateral neuroforaminal narrowing.. .  The paravertebral soft tissues are within normal limits.  The visualized abdomen is unremarkable.Coronary artery calcifications are visualized    1.  Superior endplate  compression of the L1 vertebral body as described above which is new finding compared to the prior CT from 2021. 2.  The level degenerative disc disease of the lumbar spine as described above. 3.  Grade 1 anterolisthesis of L4-L5 described above. 4.  Central canal stenosis is visualized at the.  L3-L4, L4-L5 levels. 5.  Multilevel bilateral neuroforamina narrowing of the lumbar spine as outlined above. 6.  No definite acute thoracic vertebral body compression/fracture. 7.  Multilevel degenerative disc disease of the thoracic spine as described above. 8. coronary artery calcifications. Signed by Se Vidal MD    CT chest abdomen pelvis w IV contrast    Result Date: 11/30/2023  STUDY: CT Chest, Abdomen, and Pelvis with IV Contrast; 11/30/2023 at 11:29 AM. INDICATION: Fall and left sided abdominal pain. COMPARISON: XR chest 11/22/2022; CT pelvis 11/22/2022; XR pelvis 3/28/2022; XR abdomen 3/9/2022; CT AP 7/12/2021. ACCESSION NUMBER(S): [id number] ORDERING CLINICIAN: STEPHANY PAEZ TECHNIQUE: CT of the chest, abdomen, and pelvis was performed.  Contiguous axial images were obtained at 3 mm slice thickness through the chest, abdomen, and pelvis.  Coronal and sagittal reconstructions at 3 mm slice thickness were performed.  Omnipaque 350 75 mL was administered intravenously.  FINDINGS: CHEST: MEDIASTINUM: The heart is normal in size without pericardial effusion.  Central vascular structures opacify normally.  LUNGS/PLEURA: There is no pleural effusion, pleural thickening, or pneumothorax. The airways are patent. Lungs are clear without consolidation, interstitial disease, or suspicious nodules. LYMPH NODES: Thoracic lymph nodes are not enlarged. ABDOMEN:  LIVER: No hepatomegaly.  Smooth surface contour.  Multiple small calcified granulomata.  BILE DUCTS: No intrahepatic or extrahepatic biliary ductal dilatation.  GALLBLADDER: The gallbladder is distended but not inflamed.  No calcified gallstones. STOMACH:  Stomach is nondistended which would account for the wall thickening. Small hiatal hernia.  PANCREAS: No masses or ductal dilatation.  SPLEEN: Multiple calcified granulomata.  ADRENAL GLANDS: No thickening or nodules.  KIDNEYS AND URETERS: Kidneys are normal in size and location.  No renal or ureteral calculi.  PELVIS:  BLADDER: Posterior wall thickening likely related to the pelvic abscess further described below.  REPRODUCTIVE ORGANS: Uterus is absent.  Stable 2.6 cm left ovarian cyst.  BOWEL: 6.6 x 5.0 cm gas and fluid collection noted within the pelvis posterior to the urinary bladder and adjacent to the thick-walled sigmoid colon.  Collection may represent abscess related to enteritis left-sided colon which may represent inflammatory or ischemic bowel.  VESSELS: No abnormalities identified.  Abdominal aorta is normal in caliber.  PERITONEUM/RETROPERITONEUM/LYMPH NODES: No free fluid.  No pneumoperitoneum. No lymphadenopathy.  ABDOMINAL WALL: No abnormalities identified. SOFT TISSUES: No abnormalities identified.  BONES: Acute fracture of the superior aspect of the L1 vertebral body noted with approximately 50% loss of normal involvement of the pedicles or lamina.    1.6.6 x 5.0 cm gas and fluid collection within the pelvis consistent with an abscess likely related to underlying acute diverticulitis.  Wall thickening of the left-side of the colon suggesting inflammatory or ischemic bowel disease 2.Acute fracture of the L1 vertebral body. 3.No acute intrathoracic abnormality. 4.Evidence of old granulomatous disease. 5.Small hiatal hernia. Signed by Dion Martin MD    CT cervical spine wo IV contrast    Result Date: 11/30/2023  Interpreted By:  Judd Eastman, STUDY: CT CERVICAL SPINE WO IV CONTRAST;  11/30/2023 11:29 am   INDICATION: Signs/Symptoms:fall.   COMPARISON: None.   ACCESSION NUMBER(S): [id number]   ORDERING CLINICIAN: STEPHANY PAEZ   TECHNIQUE: Axial CT images of the cervical spine are  obtained. Axial, coronal and sagittal reconstructions are provided for review.   FINDINGS:     Fractures: There is no evidence for an acute fracture of the cervical spine.   Vertebral Alignment: No posttraumatic malalignment. 5 mm anterolisthesis of C3 on C4, increased since previous examination when it measured 3 mm.   Craniocervical Junction: The odontoid process and craniocervical junction are intact.   Vertebrae/Disc Spaces:  Multilevel disc space narrowing. Multilevel facet arthropathy Multilevel uncovertebral hypertrophy.Multilevel osteophyte formation.   Prevertebral/Paraspinal Soft Tissues: The prevertebral and paraspinal soft tissues are unremarkable.         Multilevel spondylosis without acute osseous abnormality of the cervical spine.   Interval increased anterolisthesis of C3 on C4 which now measures 5 mm, previously measuring 3 mm on 11/22/2022. MRI may be obtained for further assessment of underlying ligamentous injury given the interval change.   MACRO: None   Signed by: Judd Eastman 11/30/2023 11:49 AM Dictation workstation:   [id number]    CT head wo IV contrast    Result Date: 11/30/2023  Interpreted By:  Judd Eastman, STUDY: CT HEAD WO IV CONTRAST;  11/30/2023 11:29 am   INDICATION: fall.   COMPARISON: Head CT 11/22/2022   ACCESSION NUMBER(S): [id number]   ORDERING CLINICIAN: STEPHANY PAEZ   TECHNIQUE: Noncontrast axial CT scan of head was performed. Angled reformats in brain and bone windows were generated. The images were reviewed in bone, brain, blood and soft tissue windows.   FINDINGS: CSF Spaces: The ventricles, sulci and basal cisterns are within normal limits. There is no extraaxial fluid collection.   Parenchyma: Confluent periventricular and subcortical white matter hypoattenuation, nonspecific, likely reflecting chronic microvascular ischemic changes. Old appearing lacunar type infarcts of the bilateral basal ganglia. The grey-white differentiation is intact. There is no  mass effect or midline shift.  There is no intracranial hemorrhage.   Calvarium: No acute displaced calvarial fracture.   Paranasal sinuses and mastoids: Mild bilateral ethmoid opacification. Mastoid air cells appear clear.       No evidence of acute cortical infarct or intracranial hemorrhage.       MACRO: None     Signed by: Judd Eastman 11/30/2023 11:46 AM Dictation workstation:   [id number]        DATA:  EKG:  ms (11/30/23)  Anti-psychotics in 48 hours: none  Opioids/Benzodiazepines in 48 hours: 1x fentanyl and midazolam for IR procedure 12/4   Anticholinergics on board:No  Restraints:No  Indwelling catheters:No  Last BM: 12/4  UO in 24 hours: 640 ml (net +ve 310 ml)   Activity in the past 24 hours: PT/OT  Need for ambulatory devices: walker              Assessment/Plan   Amena Chapa is a 89 y.o. female on day 5 of admission presenting with Diverticulitis. She has PMH depression, AS s/p AVR (2014), HTN, HLD, RA, hypothyroidism, GERD, recurrent falls admitted on 11/30 after a fall c/b acute L1 vertebral body fracture, being managed nonoperatively. Hospital course notable for acute diverticulitis c/b pelvic abscess, on Zosyn and s/p IR-guided drain placement 12/4. Geriatrics is consulted for elderly trauma.     Principal Problem:    Diverticulitis    #Acute unwitnessed fall, unclear mechanism of injury, with resultant L1 vertebral body fracture  :: vitamin D WNL  :: PT recommending moderate intensity level of continued care  Plan:  Please check orthostatic vitals to r/o contributing factor to fall     #Acute L1 vertebral body fracture  :: Being managed nonoperatively  :: Most recent DEXA 2015: normal  :: Patient rating pain 0/10 on assessment  Plan:  C/w pain regimen as ordered: tylenol TID scheduled and lidocaine patch  May need DEXA scan as outpatient to evaluate for osteopenia/osteoporosis especially I/s/o long-term PPI use  Encourage IS use to prevent atelectasis   Continue PT/OT      #Suspected underlying mild-moderate dementia  :: No formal diagnosis, however patient on donepezil and memantine per PCP. Donepezil started in July 2023, memantine started in November 2023.  :: BADLs: requires assistance in bathing, dependent on adult diapers for urinary incontinence  :: IADLs: relies on assisted living facility for shopping/cooking//transportation/meds/finances but able to use a phone independently  :: CAM -ve, 4AT score 3 today although likely confounded by language barrier (English is 2nd language) and recent versed/fentanyl for IR procedure today.  :: Patient does have hx depression which may mimic dementia. Other mimickers include hypothyroidism (TSH last month 6.59(H), on synthroid), vitamin B12 deficiency (vitamin B12 HIGH)  :: No current concern for delirium, however patient is at high risk given age, fracture, suspected underlying cognitive impairment, and hospitalization  Plan:  C/w donepezil and memantine as prescribed  Patient's son agreeable to continuing these medications for now, pending conversation with AL psychiatrist and PCP after discharge  Please consider the following general measures for minimizing delirium in a hospitalized patient:   -Bright lights during the day, keeps blinds up, switch all lights on   -avoid disturbances at night. Encourage at least 6 hours uninterrupted sleep. Consider d/c 4am vitals check  -avoid benzodiazepines, sedatives. Minimize opioids   -avoid anti-cholinergics    -avoid restraints. D/c camacho if possible.   - if requiring no to little insulin coverage, d/c sliding scale  -use low dose haldol 0.5mg PO (IM if PO not possible) only PRN severe agitation where pt exhibits volatile behavior and is a threat to self or others. EKGs to monitor QTc   -daily orientation to time and place by the staff   -out of bed to chair few hours everyday  - encourage stimulating activities during the day if possible      #Polypharmacy  :: Per  medication list from [facility], patient on many high risk medications including antihistamines, trazodone, combination PPI + H2 blocker  :: Patient has prolonged Qtc 517 ms. Not currently on any Qtc prolonging agents  Plan:  Agree with holding home trazodone, would discontinue melatonin as well as patient is on a high dose and do not want to make her overly sleepy. Can make melatonin prn    Agree with holding home loratadine as antihistamine medications can cause confusion in the elderly  Agree with holding home omeprazole and continuing famotidine monotherapy  Avoid QT prolonging agents given prolonged Qtc 517 ms     #Advanced care planning  #Code status  :: HCPOA is son Luis (documents scanned into EMR)  :: Per conversation 12/4 with son/HCPOA, patient and son would NOT want resuscitative measures. Clearly mentioned no CPR, no intubation.  Plan:  Please update code status to DNR/DNI     Medication Evaluation:   High risk medications: gabapentin, famotidine, mirtazapine    Geriatric medicine will continue to follow the patient. Thank you for allowing geriatric medicine to be involved in the care of your patient. Geriatric medicine consultation team is available during work hours Monday through Friday. For any emergency issues requiring immediate assistance over the weekend, please page Geriatrics pager [phone]      Patient seen and discussed with Dr. Spear who agrees with the plan as outlined above.     Rachell Dang MD

## 2023-12-05 NOTE — PROGRESS NOTES
SRINIW met with patient and her son Luis at bedside. Luis conveyed that he understood why [facility] will not accept the patient at this time. Luis stated that he would like for his mother (patient) to be referred to [facility] for her physical therapy needs, and once that is completed she will return to [facility], her place of residency. The referral was sent and they are reviewing. LSW will follow until discharged.

## 2023-12-05 NOTE — PROGRESS NOTES
Physical Therapy    Physical Therapy Treatment    Patient Name: Amena Chapa  MRN: [id number]  Today's Date: 12/5/2023  Time Calculation  Start Time: 1144  Stop Time: 1202  Time Calculation (min): 18 min       Assessment/Plan   PT Assessment  End of Session Communication: Bedside nurse, PCT/NA/CTA  End of Session Patient Position: Up in chair, Alarm on     PT Plan  PT Plan: Skilled PT  PT Frequency: 3 times per week  PT Discharge Recommendations: Moderate intensity level of continued care  PT Recommended Transfer Status: Assist x1  PT - OK to Discharge: Yes (EVal completed and recs made.)      General Visit Information:   PT  Visit  PT Received On: 12/05/23  Prior to Session Communication: Bedside nurse  Patient Position Received: Bed, 2 rail up, Alarm on  Family/Caregiver Present: No  General Comment: pt able to stand with aide, seated EOB. assisted 2/2 unsteadiess and pt not wanting to use FWW despite cues. assisted to chair with modA arm and arm. pt completed seated ther-ex during session.     Subjective   Precautions:  Precautions  Medical Precautions: Fall precautions    Objective   Pain:  Pain Assessment  Pain Assessment: 0-10  Pain Score: 0 - No pain  Cognition:  Cognition  Orientation Level: Oriented X4 (with cues for date)  Lines/Tubes/Drains:  Closed/Suction Drain Left;Ventral RLQ Bulb 8 Fr. (Active)   Number of days: 0       External Urinary Catheter Female (Active)   Number of days: 4       PT Treatments:  Therapeutic Exercise  Therapeutic Exercise Performed: Yes  Therapeutic Exercise Activity 1: 2x10 LAQs  Therapeutic Exercise Activity 2: 2x10 APs  Therapeutic Exercise Activity 3: 2x10 seated hip marches  Therapeutic Activity  Therapeutic Activity Performed: Yes  Therapeutic Activity 1: pt stood ~2 minutes with forward flexed posture of head/trunk with min-modA while aide completed pericare 2/2 pt having BM. VC to bring head/chest into upright midline positioning     Bed Mobility  Bed Mobility: No  (pt seated EOB start of session.)  Ambulation/Gait Training  Ambulation/Gait Training Performed: Yes  Ambulation/Gait Training 1  Surface 1: Level tile  Device 1: No device  Assistance 1: Moderate verbal cues, Moderate assistance  Quality of Gait 1: Narrow base of support, Antalgic  Transfer 1  Transfer From 1: Bed to, Chair with arms to  Transfer to 1: Stand  Technique 1: Sit to stand  Transfer Device 1:  (arm and arm assist)  Transfer Level of Assistance 1: Minimum assistance, Moderate verbal cues  Trials/Comments 1: 2  Transfers 2  Transfer From 2: Stand to  Transfer to 2: Chair with arms  Technique 2: Stand to sit  Transfer Device 2:  (arm and arm)  Transfer Level of Assistance 2: Minimum assistance, Minimal verbal cues  Trials/Comments 2: 2             Outcome Measures:  [facility] Basic Mobility  Turning from your back to your side while in a flat bed without using bedrails: A lot  Moving from lying on your back to sitting on the side of a flat bed without using bedrails: A little  Moving to and from bed to chair (including a wheelchair): A little  Standing up from a chair using your arms (e.g. wheelchair or bedside chair): A little  To walk in hospital room: A little  Climbing 3-5 steps with railing: Total  Basic Mobility - Total Score: 15                            Education Documentation  Precautions, taught by Sherrill Zhu PT at 12/5/2023 12:29 PM.  Learner: Patient  Readiness: Acceptance  Method: Explanation  Response: Verbalizes Understanding    Body Mechanics, taught by Sherrill Zhu, PT at 12/5/2023 12:29 PM.  Learner: Patient  Readiness: Acceptance  Method: Explanation  Response: Verbalizes Understanding    Mobility Training, taught by Sherrill Zhu PT at 12/5/2023 12:29 PM.  Learner: Patient  Readiness: Acceptance  Method: Explanation  Response: Verbalizes Understanding    Education Comments  No comments found.          OP EDUCATION:       Encounter Problems       Encounter  Problems (Active)       Balance       STG - Maintains static standing balance with upper extremity support with CGA and WW  (Progressing)       Start:  12/01/23    Expected End:  12/11/23       INTERVENTIONS:  1. Practice standing with minimal support.  2. Educate patient about standing tolerance.  3. Educate patient about independence with gait, transfers, and ADL's.  4. Educate patient about use of assistive device.  5. Educate patient about self-directed care.         STG - Maintains dynamic sitting balance with upper extremity support with SBA  (Progressing)       Start:  12/01/23    Expected End:  12/11/23       INTERVENTIONS:  1. Practice sitting on the edge of a bed/mat with minimal support.  2. Educate patient about maintining total hip precautions while maintaining balance.  3. Educate patient about pressure relief.  4. Educate patient about use of assistive device.            Mobility       STG - Patient will ambulate 25 ft with ww and CGA  (Progressing)       Start:  12/01/23    Expected End:  12/11/23               Pain - Adult          Transfers       STG - Transfer from bed to chair with  CGA and ww  (Progressing)       Start:  12/01/23    Expected End:  12/11/23            STG - Patient will perform bed mobility with CGA  (Progressing)       Start:  12/01/23    Expected End:  12/11/23 12/05/23 at 12:30 PM   Sherrill Zhu PT   Rehab Office: [phone]

## 2023-12-05 NOTE — PROGRESS NOTES
Transitional Care Coordinator Note: Per medical team patient is not medically ready for discharge. Plan for patient to discharge to SNF pending choices and accepting facility. SW informed patient's son 12/4 that [facility] refused to take patient back with current PT/OT recommendations. TCC placed request for PT and OT call patient's son when available.       Isadora Valle RN BSN

## 2023-12-05 NOTE — CARE PLAN
Problem: Fall/Injury  Goal: Verbalize understanding of personal risk factors for fall in the hospital  Outcome: Progressing  Goal: Verbalize understanding of risk factor reduction measures to prevent injury from fall in the home  Outcome: Progressing  Goal: Use assistive devices by end of the shift  Outcome: Progressing  Goal: Pace activities to prevent fatigue by end of the shift  Outcome: Progressing     Problem: Safety  Goal: Patient will be injury free during hospitalization  Outcome: Progressing  Goal: I will remain free of falls  Outcome: Progressing     Problem: Daily Care  Goal: Daily care needs are met  Outcome: Progressing     Problem: Psychosocial Needs  Goal: Demonstrates ability to cope with hospitalization/illness  Outcome: Progressing  Goal: Collaborate with me, my family, and caregiver to identify my specific goals  Outcome: Progressing     Problem: Discharge Barriers  Goal: My discharge needs are met  Outcome: Progressing     Problem: Pain  Goal: Takes deep breaths with improved pain control throughout the shift  Outcome: Progressing  Goal: Turns in bed with improved pain control throughout the shift  Outcome: Progressing  Goal: Walks with improved pain control throughout the shift  Outcome: Progressing  Goal: Performs ADL's with improved pain control throughout shift  Outcome: Progressing  Goal: Participates in PT with improved pain control throughout the shift  Outcome: Progressing  Goal: Free from acute confusion related to pain meds throughout the shift  Outcome: Progressing     Problem: Skin  Goal: Participates in plan/prevention/treatment measures  Outcome: Progressing  Goal: Prevent/manage excess moisture  Outcome: Progressing  Goal: Prevent/minimize sheer/friction injuries  Outcome: Progressing  Goal: Promote/optimize nutrition  Outcome: Progressing  Goal: Promote skin healing  Outcome: Progressing     Problem: Pain - Adult  Goal: Verbalizes/displays adequate comfort level or baseline  comfort level  Outcome: Progressing     Problem: Safety - Adult  Goal: Free from fall injury  Outcome: Progressing     Problem: Discharge Planning  Goal: Discharge to home or other facility with appropriate resources  Outcome: Progressing     Problem: Chronic Conditions and Co-morbidities  Goal: Patient's chronic conditions and co-morbidity symptoms are monitored and maintained or improved  Outcome: Progressing   The patient's goals for the shift include      The clinical goals for the shift include pt will be free from falls    Over the shift, the patient did not make progress toward the following goals. Barriers to progression include emotions Recommendations to address these barriers include abilio

## 2023-12-06 NOTE — CARE PLAN
The patient's goals for the shift include sleep    The clinical goals for the shift include increase pt food intake

## 2023-12-06 NOTE — CARE PLAN
Problem: Skin  Goal: Prevent/manage excess moisture  Outcome: Progressing   The patient's goals for the shift include  remain dry for entire shift    The clinical goals for the shift include offer to toilet patient Q 2-3 hours while awake

## 2023-12-06 NOTE — PROGRESS NOTES
Social Work Note:    MIRA checked on referral to [facility] in [facility].  [facility] requested more clinical information be sent.  MIRA resent referral.  MIRA asked if they would be able to accept patient.  MIRA sent a message to [facility] to see if patient is medically ready.  CNP reported that he would check with attending to see.  MIRA will continue to follow    GABE Burks, MIRA-S

## 2023-12-06 NOTE — CARE PLAN
Problem: Pain  Goal: Turns in bed with improved pain control throughout the shift  Outcome: Progressing   The patient's goals for the shift include  pain control in bed while turning    The clinical goals for the shift include patient will report pain level less than 4

## 2023-12-06 NOTE — PROGRESS NOTES
[facility]  TRAUMA SERVICE - PROGRESS NOTE    Patient Name: Amena Chapa  MRN: [id number]  Admit Date: 1130  : 1933  AGE: 89 y.o.   GENDER: female  ==============================================================================  MECHANISM OF INJURY:   89 y/o female present s/p unwitnessed fall at SNF.     LOC (yes/no?): Unknown   Anticoagulant / Anti-platelet Rx? (for what dx?): ASA  Referring Facility Name (N/A for scene EMR run): Arrived via EMS    INJURIES:   L1 Vert Body Fx   Pelvic Abscess secondary to Acute diverticulitis    OTHER MEDICAL PROBLEMS:  HTN  HLD  RA  GERD  Aortic Stenosis  CAD w/ stent on ASA   Dementia A/O X 2 Baseline    INCIDENTAL FINDINGS:  Hiatal Hernia     PROCEDURES:   - IR drain placement for fluid collection adjacent to bladder    ==============================================================================  TODAY'S ASSESSMENT AND PLAN OF CARE:  ##L1 Vert Body Fx  -Ortho Spine consulted aprec recs        ->No acute orthopaedic intervention. Admit to Trauma for PT.       -> WB: No restrictions       -> Pain meds per primary team/ED       -> Antibiotics: None from orthopaedic standpoint        -> Diet: Okay for diet from orthopaedic standpoint  -Multimodal w/ Sched Tylenol, Oxy 2.5/5 PRN  -PT/OT eval     ##Pelvic Abscess 2/2 Diverticulitis   -IR Consulted for Drain placement aprec recs       ->IR placed KATHRYN drain on , thick purulent drainage in bulb  -Cont IV ABX Zosyn day 3/7    -> Drain cultures positive for pseudomonas A., follow up sensitivities   -> Flush drain 10cc BID with NS  - Blood cultures/Urine culture, NGTD x2 days  - Will need colonoscopy and colorectal follow up after discharge (to be scheduled)  - Will require drain for approximately 2 weeks   -> follow up with trauma clinic for drain removal scheduled  12:30pm    ##Comorbidity: HTN, HLD, RA, GERD, Aortic Stenosis, CAD, Dementia   -Cont home Amlodipine, Aricept,  Cymbalta, Pepcid, Gabapentin, Synthroid, Melatonin, Mirtazapine, and Xalatan  -Holding Trazodone   - Per geriatrics consult, melatonin switched to prn, code status DNR/DNI after discussion with patient POA, orthostatics ordered and B12, Vitamin D level ordered  - Avoid QT prolonging agents due to prolonged QT    ##FEN/GI/: Pre-hospital UTI   - Regular diet resumed, magic cups added for supplementation  - D5LR at 50ml/hr due to low intake   -Q4Hr I/O's   -Check and replace lytes as indicated     ##PPX:  -SCD's  -Lovenox restarted 12/4    Dispo: Continue RNF care, patient recommended SNF and SW working on placement.      Pt. discussed with Dr. Hayley Chang, [facility]  Trauma Surgery  [id number]    Total face to face time spent with patient of 25 minutes, with >50% of the time spent discussing plan of care/management, counseling/educating on disease processes, explaining results of diagnostic testing.      ==============================================================================  CHIEF COMPLAINT / OVERNIGHT EVENTS:   Patient complaining of lack of appetite this AM, no other complaints at this time. Pt urinating appropriately, and does not remember if she has had a BM. Patient states her pain is under control this AM      MEDICAL HISTORY / ROS:  Admission history and ROS reviewed. Pertinent changes as follows: NONE      PHYSICAL EXAM:  Heart Rate:  [59-71]   Temp:  [36.2 °C (97.2 °F)-37.5 °C (99.5 °F)]   Resp:  [16-18]   BP: (111-137)/(57-73)   SpO2:  [98 %-100 %]     Physical Exam  Constitutional:       Appearance: She is not ill-appearing or toxic-appearing.      Comments: Pt in bed appearing uncomfortable, endorsing abdominal pain   HENT:      Head: Normocephalic.      Nose: No congestion or rhinorrhea.      Mouth/Throat:      Mouth: Mucous membranes are moist.      Pharynx: No oropharyngeal exudate or posterior oropharyngeal erythema.      Comments: Age related dental decay  Eyes:      General: No  scleral icterus.     Pupils: Pupils are equal, round, and reactive to light.   Cardiovascular:      Rate and Rhythm: Normal rate and regular rhythm.      Pulses: Normal pulses.      Heart sounds: Normal heart sounds. No murmur heard.     No gallop.   Pulmonary:      Effort: Pulmonary effort is normal. No respiratory distress.      Breath sounds: Normal breath sounds. No wheezing or rales.      Comments: Speaks in full sentences, no overnight oxygen requirements  Chest:      Chest wall: No tenderness.   Abdominal:      General: Bowel sounds are normal.      Palpations: Abdomen is soft.      Tenderness: There is abdominal tenderness.      Comments: +BM this admission. Patient complaining of slight global abdominal tenderness on palpation. Abdomen not distended, no masses or gross organomegaly   Musculoskeletal:         General: No swelling, tenderness or signs of injury.      Cervical back: Normal range of motion. No rigidity or tenderness.   Skin:     General: Skin is warm and dry.      Capillary Refill: Capillary refill takes less than 2 seconds.      Coloration: Skin is not jaundiced.      Findings: No erythema or rash.      Comments: Age related skin changes   Neurological:      Mental Status: Mental status is at baseline. She is disoriented.      Comments: A&Ox2, GCS 14 (baseline)   Psychiatric:         Mood and Affect: Mood normal.        IMAGING SUMMARY:   Please refer to imaging studies for all historical imaging and interpretations.     I have reviewed all medications, laboratory results, and imaging pertinent for today's encounter.

## 2023-12-07 NOTE — CARE PLAN
The patient's goals for the shift include  prevent skin breakdown     The clinical goals for the shift include frequent toileting

## 2023-12-07 NOTE — PROGRESS NOTES
[facility] able to accept, but will not have bed available until tomorrow. SW to request transport for tomorrow at 1pm and will continue to follow to facilitate discharge plan.     Maeve Brown LCSW

## 2023-12-07 NOTE — CARE PLAN
The patient's goals for the shift include      The clinical goals for the shift include increase food intake

## 2023-12-07 NOTE — PROGRESS NOTES
Occupational Therapy    OT Treatment    Patient Name: Amena Chapa  MRN: [id number]  Today's Date: 12/7/2023  Time Calculation  Start Time: 0859  Stop Time: 0938  Time Calculation (min): 39 min          12/07/23 0859   OT Last Visit   OT Received On 12/07/23   General   Family/Caregiver Present No   Prior to Session Communication Bedside nurse   Patient Position Received Bed, 3 rail up;Alarm on   General Comment Pt noted to be soiled in bed, pt willing to get up to chair, pt demos x4 episodes of loose bowels during session, requires increased time for hygiene d/t this   Pain Assessment   Pain Assessment 0-10   Pain Score 0 - No pain   Cognition   Overall Cognitive Status Impaired at baseline   Orientation Level Disoriented to time;Disoriented to situation;Disoriented to place   UE Dressing   UE Dressing Level of Assistance Setup   UE Dressing Where Assessed Edge of bed   UE Dressing Comments doffing soiled gown and donning new gown   LE Dressing   LE Dressing Yes   Sock Level of Assistance Maximum assistance   LE Dressing Where Assessed Edge of bed   LE Dressing Comments requires Max A for keeping BLE in figure 4 position to prevent bending, pt still presenting with difficulty with donning sock in this position   Toileting   Toileting Level of Assistance Moderate assistance;Moderate verbal cues   Where Assessed   (EOB and chair)   Toileting Comments Pt demos anterior autumn hygiene, requires assist for posterior hygiene, pt requires assist for thoroughness, assist increased with time d/t fatigue   Functional Standing Tolerance   Time 2 min  (x4 bouts of 2 minutes for hygiene management)   Activity autumn care with therapist assist   Functional Standing Tolerance Comments requires cues for maintaining stand   Bed Mobility   Bed Mobility Yes   Bed Mobility 1   Bed Mobility 1 Sitting to supine   Level of Assistance 1 Minimum assistance;Moderate verbal cues   Bed Mobility Comments 1 use of bed rails and draw sheet    Transfers   Transfer Yes   Transfer 1   Transfer From 1 Sit to;Stand to   Transfer to 1 Stand;Sit   Technique 1 Sit to stand   Transfer Device 1 Walker   Transfer Level of Assistance 1 Minimum assistance;Minimal verbal cues   Trials/Comments 1 x3   Transfers 2   Transfer From 2 Stand to   Transfer to 2 Chair with arms   Technique 2 Lateral   Transfer Device 2 Walker   Transfer Level of Assistance 2 Minimum assistance;Minimal verbal cues   Therapeutic Activity   Therapeutic Activity Performed Yes   Therapeutic Activity 1 Pt sat EOB x7 minutes with SBA and min VC. COmpleted multiple STS transfers d/t toileting needs and hygiene management, Completing bed to chair transfer with Min A and FWW. Pt appearing very fatigued at end of session   IP OT Assessment   End of Session Communication Bedside nurse;PCT/NA/CTA   End of Session Patient Position Up in chair;Alarm on     Assessment:  End of Session Communication: Bedside nurse, PCT/NA/CTA  End of Session Patient Position: Up in chair, Alarm on       Plan:        Subjective     Outcome Measures:[facility] Daily Activity  Putting on and taking off regular lower body clothing: A lot  Bathing (including washing, rinsing, drying): A lot  Putting on and taking off regular upper body clothing: A little  Toileting, which includes using toilet, bedpan or urinal: A lot  Taking care of personal grooming such as brushing teeth: A little  Eating Meals: None  Daily Activity - Total Score: 16  Education Documentation  Body Mechanics, taught by Vanessa Aguilar OT at 12/7/2023 10:16 AM.  Learner: Patient  Readiness: Acceptance  Method: Explanation  Response: Needs Reinforcement    Precautions, taught by Vanessa Aguilar OT at 12/7/2023 10:16 AM.  Learner: Patient  Readiness: Acceptance  Method: Explanation  Response: Needs Reinforcement    ADL Training, taught by Vanessa Aguilar OT at 12/7/2023 10:16 AM.  Learner: Patient  Readiness: Acceptance  Method: Explanation  Response: Needs  Reinforcement    Education Comments  No comments found.            Goals:  Encounter Problems       Encounter Problems (Active)       ADLs       Patient with complete upper body dressing with modified independent level of assistance   with  while edge of bed. (Progressing)       Start:  12/03/23    Expected End:  12/17/23            Patient with complete lower body dressing with modified independent level of assistance donning pants without a zipper/fasteners and socks with PRN adaptive equipment while edge of bed  and/or supportive standing (Progressing)       Start:  12/03/23    Expected End:  12/17/23            Patient will complete daily grooming tasks brushing teeth and washing face/hair with modified independent level of assistance and PRN adaptive equipment while edge of bed  and/or supportive standing. (Progressing)       Start:  12/03/23    Expected End:  12/17/23            Patient will complete toileting including hygiene clothing management/hygiene with modified independent level of assistance and adaptive equipment as needed (I.e.raised toilet seat and/or grab bars). (Progressing)       Start:  12/03/23    Expected End:  12/17/23               MOBILITY       Patient will perform Functional mobility mod  Household distances/Community Distances with modified independent level of assistance and least restrictive device in order to improve safety and functional mobility. (Progressing)       Start:  12/03/23    Expected End:  12/17/23               TRANSFERS       Patient will perform bed mobility modified independent level of assistance and bed rails in order to improve safety and independence with mobility (Progressing)       Start:  12/03/23    Expected End:  12/17/23            Patient will complete functional transfer to/from toilet and/or room chair with least restrictive device with modified independent level of assistance. (Progressing)       Start:  12/03/23    Expected End:  12/17/23

## 2023-12-07 NOTE — PROGRESS NOTES
[place]  TRAUMA SERVICE - PROGRESS NOTE    Patient Name: Amena Chapa  MRN: [id number]  Admit Date: 1130  : 1933  AGE: 89 y.o.   GENDER: female  ==============================================================================  MECHANISM OF INJURY:   89 y/o female present s/p unwitnessed fall at SNF.     LOC (yes/no?): Unknown   Anticoagulant / Anti-platelet Rx? (for what dx?): ASA  Referring Facility Name (N/A for scene EMR run): Arrived via EMS    INJURIES:   L1 Vert Body Fx   Pelvic Abscess secondary to Acute diverticulitis    OTHER MEDICAL PROBLEMS:  HTN  HLD  RA  GERD  Aortic Stenosis  CAD w/ stent on ASA   Dementia A/O X 2 Baseline    INCIDENTAL FINDINGS:  Hiatal Hernia     PROCEDURES:   - IR drain placement for fluid collection adjacent to bladder    ==============================================================================  TODAY'S ASSESSMENT AND PLAN OF CARE:  ##L1 Vert Body Fx  -Ortho Spine consulted aprec recs        ->No acute orthopaedic intervention. Admit to Trauma for PT.       -> WB: No restrictions       -> Pain meds per primary team/ED       -> Antibiotics: None from orthopaedic standpoint        -> Diet: Okay for diet from orthopaedic standpoint  -Multimodal w/ Sched Tylenol, Oxy 2.5/5 PRN  -PT/OT eval     ##Pelvic Abscess 2/2 Diverticulitis   -IR Consulted for Drain placement aprec recs       ->IR placed KATHRYN drain on , thick purulent drainage in bulb  -Cont IV ABX Zosyn day 3/7    -> Drain cultures positive for pseudomonas A., follow up sensitivities   -> Flush drain 10cc BID with NS  - Blood cultures/Urine culture, NGTD x2 days  - Will need colonoscopy and colorectal follow up after discharge (to be scheduled)  - Will require drain for approximately 2 weeks   -> follow up with trauma clinic for drain removal scheduled  12:30pm    ##Comorbidity: HTN, HLD, RA, GERD, Aortic Stenosis, CAD, Dementia   -Cont home Amlodipine, Aricept,  Cymbalta, Pepcid, Gabapentin, Synthroid, Melatonin, Mirtazapine, and Xalatan  -Holding Trazodone   - Per geriatrics consult, melatonin switched to prn, code status DNR/DNI after discussion with patient POA, orthostatics ordered and B12, Vitamin D level ordered  - Avoid QT prolonging agents due to prolonged QT    ##FEN/GI/: Pre-hospital UTI   - Regular diet resumed, magic cups added for supplementation  - Assist feed order placed and spoke with nurse about encouraging/assisting with meals  -Q4Hr I/O's       ##PPX:  -SCD's  -Lovenox restarted 12/4    Dispo: Pt medically ready for discharge with plan to transition to [facility] at 1pm 12/8    Pt. discussed with Dr. Hayley Chang, [facility]  Trauma Surgery  [id number]    Total face to face time spent with patient of 25 minutes, with >50% of the time spent discussing plan of care/management, counseling/educating on disease processes, explaining results of diagnostic testing.      ==============================================================================  CHIEF COMPLAINT / OVERNIGHT EVENTS:   Patient complaining of lack of appetite this AM, no other complaints at this time. Pt urinating appropriately, and does not remember if she has had a BM. Patient states her pain is under control this AM      MEDICAL HISTORY / ROS:  Admission history and ROS reviewed. Pertinent changes as follows: NONE      PHYSICAL EXAM:  Heart Rate:  [55-71]   Temp:  [36.6 °C (97.9 °F)-37.5 °C (99.5 °F)]   Resp:  [18]   BP: (108-138)/(48-65)   SpO2:  [97 %-100 %]     Physical Exam  Constitutional:       Appearance: She is not ill-appearing or toxic-appearing.      Comments: Pt in bed appearing uncomfortable, endorsing abdominal pain   HENT:      Head: Normocephalic.      Nose: No congestion or rhinorrhea.      Mouth/Throat:      Mouth: Mucous membranes are moist.      Pharynx: No oropharyngeal exudate or posterior oropharyngeal erythema.      Comments: Age related dental decay  Eyes:       General: No scleral icterus.     Pupils: Pupils are equal, round, and reactive to light.   Cardiovascular:      Rate and Rhythm: Normal rate and regular rhythm.      Pulses: Normal pulses.      Heart sounds: Normal heart sounds. No murmur heard.     No gallop.   Pulmonary:      Effort: Pulmonary effort is normal. No respiratory distress.      Breath sounds: Normal breath sounds. No wheezing or rales.      Comments: Speaks in full sentences, no overnight oxygen requirements  Chest:      Chest wall: No tenderness.   Abdominal:      General: Bowel sounds are normal.      Palpations: Abdomen is soft.      Tenderness: There is abdominal tenderness.      Comments: +BM this admission. Patient complaining of slight global abdominal tenderness on palpation. Abdomen not distended, no masses or gross organomegaly   Musculoskeletal:         General: No swelling, tenderness or signs of injury.      Cervical back: Normal range of motion. No rigidity or tenderness.   Skin:     General: Skin is warm and dry.      Capillary Refill: Capillary refill takes less than 2 seconds.      Coloration: Skin is not jaundiced.      Findings: No erythema or rash.      Comments: Age related skin changes   Neurological:      Mental Status: Mental status is at baseline. She is disoriented.      Comments: A&Ox2, GCS 14 (baseline)   Psychiatric:         Mood and Affect: Mood normal.        IMAGING SUMMARY:   Please refer to imaging studies for all historical imaging and interpretations.     I have reviewed all medications, laboratory results, and imaging pertinent for today's encounter.

## 2023-12-07 NOTE — PROGRESS NOTES
Physical Therapy    Physical Therapy Treatment    Patient Name: Amena Chapa  MRN: [id number]  Today's Date: 12/7/2023  Time Calculation  Start Time: 1156  Stop Time: 1226  Time Calculation (min): 30 min       Assessment/Plan   PT Assessment  End of Session Communication: Bedside nurse, PCT/NA/CTA  End of Session Patient Position: Up in chair, Alarm on     PT Plan  PT Plan: Skilled PT  PT Frequency: 3 times per week  PT Discharge Recommendations: Moderate intensity level of continued care  PT Recommended Transfer Status: Assist x1  PT - OK to Discharge: Yes (EVal completed and recs made.)      General Visit Information:   PT  Visit  PT Received On: 12/07/23  Prior to Session Communication: Bedside nurse  Patient Position Received: Up in chair, Alarm on  Family/Caregiver Present: No  General Comment: pt alert and agreeable for therapy. pt stating she feels weak, eager to ambulate. pt sat 95-98% on 2L O2 via NC.     Subjective   Precautions:  Precautions  Medical Precautions: Fall precautions  Vital Signs:  Vital Signs  Heart Rate:  (60s with ambulating)  SpO2: 98 %    Objective   Pain:  Pain Assessment  Pain Assessment: 0-10  Pain Score: 0 - No pain  Cognition:  Cognition  Orientation Level: Disoriented to time  Lines/Tubes/Drains:  Closed/Suction Drain Left;Ventral RLQ Bulb 8 Fr. (Active)   Number of days: 3       External Urinary Catheter Female (Active)   Number of days: 6       PT Treatments:  Therapeutic Exercise  Therapeutic Exercise Performed: Yes  Therapeutic Exercise Activity 1: 2x10 LAQs  Therapeutic Activity  Therapeutic Activity Performed: Yes  Therapeutic Activity 1: pt took x2 sitting rest breaks in between ambulation trials with SBA. VC to bring head/chest into upright midline positioning     Bed Mobility  Bed Mobility: No (pt in chair pre/post session)  Ambulation/Gait Training  Ambulation/Gait Training Performed: Yes  Ambulation/Gait Training 1  Surface 1: Level tile  Device 1: Rolling  walker  Assistance 1: Contact guard  Quality of Gait 1: Narrow base of support, Inconsistent stride length, Decreased step length (slowed gait speed; forward flexed head/shoulders. VC to improve posture)  Comments/Distance (ft) 1: 15ftx1; 30ftx1; 50ftx1  Transfers  Transfer: Yes  Transfer 1  Transfer From 1: Chair without arms to, Chair with arms to  Transfer to 1: Stand, Sit  Technique 1: Sit to stand, Stand to sit  Transfer Device 1: Walker  Transfer Level of Assistance 1:  (CGA-Clemencia)  Trials/Comments 1: x3 trials; vc on proper hand placement to walker             Outcome Measures:  [facility] Basic Mobility  Turning from your back to your side while in a flat bed without using bedrails: A lot  Moving from lying on your back to sitting on the side of a flat bed without using bedrails: A lot  Moving to and from bed to chair (including a wheelchair): A little  Standing up from a chair using your arms (e.g. wheelchair or bedside chair): A little  To walk in hospital room: A little  Climbing 3-5 steps with railing: Total  Basic Mobility - Total Score: 14         OP EDUCATION:       Encounter Problems       Encounter Problems (Active)       Balance       STG - Maintains static standing balance with upper extremity support with CGA and WW  (Progressing)       Start:  12/01/23    Expected End:  12/11/23       INTERVENTIONS:  1. Practice standing with minimal support.  2. Educate patient about standing tolerance.  3. Educate patient about independence with gait, transfers, and ADL's.  4. Educate patient about use of assistive device.  5. Educate patient about self-directed care.         STG - Maintains dynamic sitting balance with upper extremity support with SBA  (Progressing)       Start:  12/01/23    Expected End:  12/11/23       INTERVENTIONS:  1. Practice sitting on the edge of a bed/mat with minimal support.  2. Educate patient about maintining total hip precautions while maintaining balance.  3. Educate patient about  pressure relief.  4. Educate patient about use of assistive device.            Mobility       STG - Patient will ambulate 25 ft with ww and CGA  (Progressing)       Start:  12/01/23    Expected End:  12/11/23               Pain - Adult          Transfers       STG - Transfer from bed to chair with  CGA and ww  (Progressing)       Start:  12/01/23    Expected End:  12/11/23            STG - Patient will perform bed mobility with CGA  (Progressing)       Start:  12/01/23    Expected End:  12/11/23 12/07/23 at 3:11 PM   Sherrill Zhu, PT   Rehab Office: [phone]

## 2023-12-08 NOTE — CARE PLAN
Problem: Fall/Injury  Goal: Verbalize understanding of personal risk factors for fall in the hospital  Outcome: Progressing  Goal: Verbalize understanding of risk factor reduction measures to prevent injury from fall in the home  Outcome: Progressing  Goal: Use assistive devices by end of the shift  Outcome: Progressing  Goal: Pace activities to prevent fatigue by end of the shift  Outcome: Progressing     Problem: Safety  Goal: Patient will be injury free during hospitalization  Outcome: Progressing  Goal: I will remain free of falls  Outcome: Progressing     Problem: Psychosocial Needs  Goal: Demonstrates ability to cope with hospitalization/illness  Outcome: Progressing  Goal: Collaborate with me, my family, and caregiver to identify my specific goals  Outcome: Progressing     Problem: Discharge Barriers  Goal: My discharge needs are met  Outcome: Progressing     Problem: Pain  Goal: Takes deep breaths with improved pain control throughout the shift  Outcome: Progressing  Goal: Turns in bed with improved pain control throughout the shift  Outcome: Progressing  Goal: Walks with improved pain control throughout the shift  Outcome: Progressing  Goal: Performs ADL's with improved pain control throughout shift  Outcome: Progressing  Goal: Participates in PT with improved pain control throughout the shift  Outcome: Progressing  Goal: Free from acute confusion related to pain meds throughout the shift  Outcome: Progressing     Problem: Skin  Goal: Participates in plan/prevention/treatment measures  Outcome: Progressing  Goal: Prevent/manage excess moisture  Outcome: Progressing  Goal: Prevent/minimize sheer/friction injuries  Outcome: Progressing  Goal: Promote/optimize nutrition  Outcome: Progressing  Goal: Promote skin healing  Outcome: Progressing     Problem: Pain - Adult  Goal: Verbalizes/displays adequate comfort level or baseline comfort level  Outcome: Progressing     Problem: Safety - Adult  Goal: Free from  fall injury  Outcome: Progressing     Problem: Discharge Planning  Goal: Discharge to home or other facility with appropriate resources  Outcome: Progressing     Problem: Chronic Conditions and Co-morbidities  Goal: Patient's chronic conditions and co-morbidity symptoms are monitored and maintained or improved  Outcome: Progressing   The patient's goals for the shift include      The clinical goals for the shift include increase food intake    Over the shift, the patient did not make progress toward the following goals. Barriers to progression include meds Recommendations to address these barriers include cognitive

## 2023-12-08 NOTE — DISCHARGE SUMMARY
Discharge Diagnosis  Diverticulitis    Issues Requiring Follow-Up  Follow up in Trauma Clinic on Dec 13th at 10am on [facility] 2nd floor.    Test Results Pending At Discharge  Pending Labs       Order Current Status    Sterile Fluid Culture/Smear Preliminary result            Hospital Course  Patient is a 89-year-old female who presented to [facility] as a transfer from outside hospital s/p unwitnessed fall with resulting L1 vertebral body fracture and concern for pelvic abscess secondary to acute diverticulitis. Per reports, patient was found by staff on the floor at her assisted living facility between her bed and bedside table. Unknown downtime. Patient is amnestic to the event and appears to be confabulating her story. Patient's son is also concerned the patient has been endorsing abdominal pain for couple weeks, not sure about complaints of nausea vomiting or diarrhea. Patient taken to outside hospital where imaging revealed acute L1 vertebral body fracture and concerns for pelvic abscess secondary to diverticulitis. Transferred to [facility] for orthospine and trauma evaluations. Orthospine consulted for assistance with L1 vertebral body fracture, no acute interventions or restrictions. Patient also had a positive urinalysis at outside hospital. Due to positive urinalysis and pelvic abscess, patient was started on IV Zosyn for a 7 day course. Blood cultures and urine cultures obtained with no growth to date. IR consulted for pelvic abscess, plan to drain 12/4. Geriatrics consulted for assistance with medical co-morbidities. Patient will additionally need a colonoscopy and colorectal follow up after discharge. PT/OT recommended moderate intensity rehab.    At the time of discharge, patient's pain was controlled with oral analgesia, patient was urinating, having BMs, sleeping, and eating well. Based on PT/OT's recommendation, patient was discharged to SNF on 12/8 with scripts and follow up appointments. Discharge plan was  discussed with the patient and all of the patient's questions were answered. Patient and family agreeable to plan.    Pertinent Physical Exam At Time of Discharge  Physical Exam  HENT:      Mouth/Throat:      Mouth: Mucous membranes are moist.   Eyes:      Extraocular Movements: Extraocular movements intact.   Cardiovascular:      Rate and Rhythm: Normal rate and regular rhythm.   Pulmonary:      Effort: Pulmonary effort is normal.   Abdominal:      Palpations: Abdomen is soft.   Skin:     General: Skin is warm and dry.   Neurological:      Mental Status: She is alert.         Home Medications     Medication List      START taking these medications     ciprofloxacin 250 mg tablet; Commonly known as: Cipro; Take 1 tablet   (250 mg) by mouth 2 times a day for 3 days.   lidocaine 4 % patch; Place 1 patch over 12 hours on the skin once daily   for 14 days. Remove & discard patch within 12 hours or as directed by MD.   metroNIDAZOLE 500 mg tablet; Commonly known as: Flagyl; Take 1 tablet   (500 mg) by mouth 3 times a day for 3 days.     CHANGE how you take these medications     acetaminophen 325 mg tablet; Commonly known as: Tylenol; Take 3 tablets   (975 mg) by mouth every 8 hours.; What changed: how much to take, when to   take this, additional instructions, Another medication with the same name   was removed. Continue taking this medication, and follow the directions   you see here.   * aspirin 81 mg EC tablet; Take 1 tablet (81 mg) by mouth 2 times a day   for 23 days.; What changed: additional instructions   * aspirin 81 mg EC tablet; Take 1 tablet (81 mg) by mouth 2 times a day   for 22 days.; What changed: You were already taking a medication with the   same name, and this prescription was added. Make sure you understand how   and when to take each.  * This list has 2 medication(s) that are the same as other medications   prescribed for you. Read the directions carefully, and ask your doctor or   other care  provider to review them with you.     CONTINUE taking these medications     amLODIPine 10 mg tablet; Commonly known as: Norvasc   donepezil 10 mg tablet; Commonly known as: Aricept   DULoxetine 30 mg DR capsule; Commonly known as: Cymbalta   famotidine 10 mg tablet; Commonly known as: Pepcid   gabapentin 300 mg capsule; Commonly known as: Neurontin   hydrocortisone 1 % cream   latanoprost 0.005 % ophthalmic solution; Commonly known as: Xalatan   levothyroxine 88 mcg tablet; Commonly known as: Synthroid, Levoxyl   loperamide 2 mg capsule; Commonly known as: Imodium   loratadine 10 mg tablet; Commonly known as: Claritin   magnesium hydroxide 400 mg/5 mL suspension; Commonly known as: Milk of   Magnesia   memantine 10 mg tablet; Commonly known as: Namenda   mirtazapine 15 mg tablet; Commonly known as: Remeron   omeprazole 40 mg DR capsule; Commonly known as: PriLOSEC   ondansetron 4 mg tablet; Commonly known as: Zofran   polyethylene glycol 17 gram packet; Commonly known as: Glycolax, Miralax   triamcinolone 0.1 % cream; Commonly known as: Kenalog     STOP taking these medications     melatonin 5 mg tablet   traZODone 50 mg tablet; Commonly known as: Desyrel       Outpatient Follow-Up  Future Appointments   Date Time Provider Department Center   12/13/2023 10:00 AM [id number] Academic       Casi Lepe MD

## 2023-12-08 NOTE — CARE PLAN
The patient's goals for the shift include      The clinical goals for the shift include increase food intake and also getting up to the chair at least 1x today before discharge

## 2023-12-08 NOTE — DISCHARGE INSTRUCTIONS
Follow up in Trauma Clinic on Dec 13th at 10am on [facility] 2nd floor.

## 2023-12-11 NOTE — LETTER
Patient: Amena Chapa  : 1933    Encounter Date: 2023    Subjective  Patient ID: Amena Chapa is a 89 y.o. female who is long term resident being seen and evaluated for multiple medical problems.    HPI   Patient is status post hospitalization for pelvic abscess with drain still in place  No fever chills  No cough no chest pain shortness of breath or swelling  P.o. intake is adequate    Review of Systems   Constitutional:  Negative for appetite change and unexpected weight change.   Eyes:  Negative for visual disturbance.   Gastrointestinal:  Negative for nausea.       Objective  /51   Pulse 63   Temp 36.7 °C (98 °F)   Resp 18   SpO2 90%     Physical Exam  Constitutional:       General: She is not in acute distress.     Appearance: Normal appearance.   HENT:      Head: Normocephalic and atraumatic.   Eyes:      Conjunctiva/sclera: Conjunctivae normal.   Cardiovascular:      Rate and Rhythm: Normal rate and regular rhythm.   Pulmonary:      Effort: Pulmonary effort is normal.      Breath sounds: Normal breath sounds.   Abdominal:      Palpations: Abdomen is soft.   Musculoskeletal:      Cervical back: Neck supple.   Lymphadenopathy:      Cervical: No cervical adenopathy.   Neurological:      Mental Status: She is alert.         Assessment/Plan  Diagnoses and all orders for this visit:  Primary hypertension  Comments:  Treated and controlled  Moderate major depression (CMS/HCC)  Comments:  Treated and controlled  IVH (intraventricular hemorrhage) (CMS/HCC)  Comments:  Remote and no sign of recurrence    Follow-up with surgery for drain removal and diverticulitis abscess follow-up   Goals    None     Reviewed hospitalization records  Recheck 1 month sooner if any issues arise      Electronically Signed By: Luis Fagan MD   23  8:31 AM

## 2023-12-11 NOTE — PROGRESS NOTES
Subjective   Patient ID: Amena Chapa is a 89 y.o. female who is long term resident being seen and evaluated for multiple medical problems.    HPI   Patient is status post hospitalization for pelvic abscess with drain still in place  No fever chills  No cough no chest pain shortness of breath or swelling  P.o. intake is adequate    Review of Systems   Constitutional:  Negative for appetite change and unexpected weight change.   Eyes:  Negative for visual disturbance.   Gastrointestinal:  Negative for nausea.       Objective   /51   Pulse 63   Temp 36.7 °C (98 °F)   Resp 18   SpO2 90%     Physical Exam  Constitutional:       General: She is not in acute distress.     Appearance: Normal appearance.   HENT:      Head: Normocephalic and atraumatic.   Eyes:      Conjunctiva/sclera: Conjunctivae normal.   Cardiovascular:      Rate and Rhythm: Normal rate and regular rhythm.   Pulmonary:      Effort: Pulmonary effort is normal.      Breath sounds: Normal breath sounds.   Abdominal:      Palpations: Abdomen is soft.   Musculoskeletal:      Cervical back: Neck supple.   Lymphadenopathy:      Cervical: No cervical adenopathy.   Neurological:      Mental Status: She is alert.         Assessment/Plan   Diagnoses and all orders for this visit:  Primary hypertension  Comments:  Treated and controlled  Moderate major depression (CMS/HCC)  Comments:  Treated and controlled  IVH (intraventricular hemorrhage) (CMS/HCC)  Comments:  Remote and no sign of recurrence    Follow-up with surgery for drain removal and diverticulitis abscess follow-up   Goals    None     Reviewed hospitalization records  Recheck 1 month sooner if any issues arise

## 2023-12-13 NOTE — PROGRESS NOTES
[facility]  TRAUMA CLINIC PROGRESS NOTE    Patient Name: Amena Chapa  MRN: [id number]  Admit Date:   : 1933  AGE: 89 y.o.   GENDER: female  ==============================================================================  MECHANISM OF INJURY:   Unwitnessed fall    INJURIES:   L1 vertebral body fracture    OTHER MEDICAL PROBLEMS:  Perforated diverticulitis resulting in pelvic abscess now status post IR drain    INCIDENTAL FINDINGS:  None    PROCEDURES:  IR drain placement on 2023    PATHOLOGY:  N/A  ==============================================================================  TODAY'S ASSESSMENT AND PLAN OF CARE:  Drain removal  - drain removed without issue  - ok to shower, wash with warm,soapy water and pat dry  - you may continue to have some drainage from this site, therefore you should keep the area covered  - once the drainage discontinues, ok to keep open to air  - if you notice creamy/foul smelling drainage, increased pain, increased redness, please return to the clinic    Perforated diverticulitis  Will provide her with a referral for colorectal surgery.  Will provide her with a referral for a colonoscopy.  FOLLOW UP/CALL  - No trauma/ACS follow up   - May return to work or school on N/A  - Return to clinic or ER sooner if pt. has any development of erythema, drainage, swelling, pain, fevers, or chills  - If you have questions or concerns that are not urgent, please feel free to call  [phone].  - Call [phone] to make additional appointment(s) as needed if unable to reschedule in office today    ==============================================================================  HISTORY OF PRESENT ILLNESS  Amena santos is a 89-year-old female who came to [facility] on  after a fall at her living facility resulting in an L1 vertebral body fracture.  She also had concern for a pelvic abscess secondary to acute diverticulitis.  Orthospine  consulted for L1 fracture no acute interventions or restrictions.  Amena had a positive urinalysis at the outside hospital.  She was started on IV Zosyn for 7 days.  Blood cultures and urine cultures were negative.  IR consulted for pelvic abscess drain placed on 12/4.  She was discharged to SNF on 12/8 on ciprofloxacin and metronidazole x 3 days.  She will need follow-up with colorectal as well as a colonoscopy.    According to Amena, she has been feeling okay.  She is eating and drinking.  She is voiding and having bowel movements.  Although she states that her bottom hurts quite a bit.  Her son who is with her states that he thinks with the antibiotics she had a lot of diarrhea causing some soreness.  She is at [facility].  The last time they recorded output from her drain was on December 9.  It was 5 cc.  She says that anything that is in her drain today is from yesterday.  She says she is very sleepy today.  Her son states that she is always sleepy.  She does not sleep well at night because of restless leg syndrome.  She does take mirtazapine for that.  She is not having any back pain from her L1 vertebral body fracture.  She states she does not have any issues with movement.    Patient is eating, drinking, voiding and having flatus, bowel movements.   MEDICAL HISTORY / ROS:  Admission history and ROS reviewed.   Patient denies:  fevers; chills; headache;  dizziness; chest pain; shortness of breath; nausea/vomiting/diarrhea/constipation; new/worsening abdominal pain or numbness/tingling/weakness of extremities.   Pertinent changes as follows:  N/A    PHYSICAL EXAM:  GCS 15, A&O x 3, no focal neurodeficits.  Skin is warm and dry  She is breathing comfortably on room air  Abdomen is soft nontender nondistended.  She has a left lower abdominal IR drain in place.  There is less than 1 teaspoon of cloudy drainage in the drain.  She is in a wheelchair.  But moving all of her  extremities.    LABS:  No results found for this or any previous visit (from the past 24 hour(s)).  MEDICATIONS:  Current Outpatient Medications   Medication Sig Dispense Refill    acetaminophen (Tylenol) 325 mg tablet Take 3 tablets (975 mg) by mouth every 8 hours. 270 tablet 0    amLODIPine (Norvasc) 10 mg tablet Take 1 tablet (10 mg) by mouth once daily. afternoon      aspirin 81 mg EC tablet Take 1 tablet (81 mg) by mouth 2 times a day for 23 days. 46 tablet 0    aspirin 81 mg EC tablet Take 1 tablet (81 mg) by mouth 2 times a day for 22 days. 44 tablet 0    donepezil (Aricept) 10 mg tablet Take 1 tablet (10 mg) by mouth once daily at bedtime.      DULoxetine (Cymbalta) 30 mg DR capsule Take 1 capsule (30 mg) by mouth 2 times a day. Noon and bedtime Do not crush or chew.      famotidine (Pepcid) 10 mg tablet Take 1 tablet (10 mg) by mouth once daily at bedtime.      gabapentin (Neurontin) 300 mg capsule Take 1 capsule (300 mg) by mouth once daily at bedtime.      hydrocortisone 1 % cream Apply 1 Application topically 2 times a day as needed.      latanoprost (Xalatan) 0.005 % ophthalmic solution Administer 1 drop into both eyes once daily at bedtime.      levothyroxine (Synthroid, Levoxyl) 88 mcg tablet Take 1 tablet (88 mcg) by mouth once daily in the morning. Take before meals.      lidocaine 4 % patch Place 1 patch over 12 hours on the skin once daily for 14 days. Remove & discard patch within 12 hours or as directed by MD. 14 patch 0    loperamide (Imodium) 2 mg capsule Take 1 capsule (2 mg) by mouth 4 times a day as needed for diarrhea.      loratadine (Claritin) 10 mg tablet Take 1 tablet (10 mg) by mouth once daily at bedtime.      magnesium hydroxide (Milk of Magnesia) 400 mg/5 mL suspension Take 10 mL by mouth every other day if needed for constipation.      memantine (Namenda) 10 mg tablet Take 1 tablet (10 mg) by mouth once daily at bedtime.      mirtazapine (Remeron) 15 mg tablet Take 1 tablet (15  mg) by mouth once daily at bedtime.      omeprazole (PriLOSEC) 40 mg DR capsule Take 1 capsule (40 mg) by mouth once daily. Afternoon Do not crush or chew.      ondansetron (Zofran) 4 mg tablet Take 1 tablet (4 mg) by mouth 2 times a day as needed for nausea or vomiting.      polyethylene glycol (Glycolax, Miralax) 17 gram packet Take 17 g by mouth every other day.      triamcinolone (Kenalog) 0.1 % cream Apply 1 Application topically 2 times a day as needed. To the left ulnar forearm       No current facility-administered medications for this visit.       IMAGING SUMMARY:  (summary of new imaging findings, not a copy of dictation)  NA    I have reviewed all laboratory and imaging results ordered/pertinent for today's encounter.

## 2023-12-15 NOTE — PROGRESS NOTES
Pt's son stating that patient stays in an assisted living facility where she sees PCP on a regular basis. Son declining TCM services at this time and has no questions that need addressing/answered during outreach. Son states patient is doing well.

## 2023-12-20 PROBLEM — E03.9 HYPOTHYROIDISM: Status: ACTIVE | Noted: 2022-03-11

## 2023-12-20 PROBLEM — M06.9 RHEUMATOID ARTHRITIS (MULTI): Status: ACTIVE | Noted: 2022-03-11

## 2023-12-20 PROBLEM — K57.90 DIVERTICULOSIS: Status: ACTIVE | Noted: 2023-01-01

## 2023-12-20 PROBLEM — I10 ESSENTIAL (PRIMARY) HYPERTENSION: Status: ACTIVE | Noted: 2022-03-11

## 2023-12-20 PROBLEM — E78.5 HYPERLIPIDEMIA: Status: ACTIVE | Noted: 2022-03-11

## 2023-12-20 NOTE — PROGRESS NOTES
Subjective   Patient ID: Amena Chapa is a 89 y.o. female who is assisted living/ home patient being seen and evaluated for multiple medical problems.    HPI   Patient is just returned from subacute nursing following hospitalization for diverticular abscess and vertebral compression fracture  She is much improved  Says she is very tired  Says she is not in any pain  Patient is eating somewhat and denies constipation  There are no fever chills    Review of Systems   Constitutional:  Negative for appetite change and unexpected weight change.   Eyes:  Negative for visual disturbance.   Gastrointestinal:  Negative for nausea.       Objective   /60   Pulse 75   Resp 18     Physical Exam  Constitutional:       General: She is not in acute distress.     Appearance: Normal appearance.   HENT:      Head: Normocephalic and atraumatic.   Eyes:      Conjunctiva/sclera: Conjunctivae normal.   Cardiovascular:      Rate and Rhythm: Normal rate and regular rhythm.   Pulmonary:      Effort: Pulmonary effort is normal.      Breath sounds: Normal breath sounds.   Abdominal:      Palpations: Abdomen is soft.   Musculoskeletal:      Cervical back: Neck supple.   Lymphadenopathy:      Cervical: No cervical adenopathy.   Neurological:      Mental Status: She is alert.         Assessment/Plan   Diagnoses and all orders for this visit:  Primary hypertension  Comments:  Treated and controlled  Moderate major depression (CMS/HCC)  Comments:  Controlled  Will decrease mirtazapine from 15 to 7.5 mg at bedtime due to fatigue  IVH (intraventricular hemorrhage) (CMS/HCC)  Comments:  Stable  Also because of sedation decrease gabapentin from 300 to 100 mg at bedtime  Follow mental status closely  Fall precautions  Back pain appears to be resolved at this time and patient is walking in the hallways with her son and daughter-in-law  Diverticulitis with abscess appears to be resolved  Dw son, Luis, and dtr in law, Miryam,  who are  present   Goals    None     Recheck 3 months sooner if any issues arise

## 2024-01-01 ENCOUNTER — APPOINTMENT (OUTPATIENT)
Dept: RADIOLOGY | Facility: HOSPITAL | Age: 89
DRG: 281 | End: 2024-01-01
Payer: MEDICARE

## 2024-01-01 ENCOUNTER — APPOINTMENT (OUTPATIENT)
Dept: CARDIOLOGY | Facility: HOSPITAL | Age: 89
DRG: 281 | End: 2024-01-01
Payer: MEDICARE

## 2024-01-01 ENCOUNTER — HOME VISIT (OUTPATIENT)
Dept: POST ACUTE CARE | Facility: EXTERNAL LOCATION | Age: 89
End: 2024-01-01
Payer: MEDICARE

## 2024-01-01 ENCOUNTER — APPOINTMENT (OUTPATIENT)
Dept: OPERATING ROOM | Facility: HOSPITAL | Age: 89
End: 2024-01-01
Payer: MEDICARE

## 2024-01-01 ENCOUNTER — LAB REQUISITION (OUTPATIENT)
Dept: LAB | Facility: HOSPITAL | Age: 89
End: 2024-01-01
Payer: MEDICARE

## 2024-01-01 ENCOUNTER — ANESTHESIA (OUTPATIENT)
Dept: OPERATING ROOM | Facility: HOSPITAL | Age: 89
End: 2024-01-01
Payer: MEDICARE

## 2024-01-01 ENCOUNTER — ANESTHESIA EVENT (OUTPATIENT)
Dept: OPERATING ROOM | Facility: HOSPITAL | Age: 89
End: 2024-01-01

## 2024-01-01 ENCOUNTER — HOSPITAL ENCOUNTER (INPATIENT)
Facility: HOSPITAL | Age: 89
LOS: 1 days | Discharge: HOSPICE | DRG: 281 | End: 2024-02-21
Attending: EMERGENCY MEDICINE | Admitting: INTERNAL MEDICINE
Payer: MEDICARE

## 2024-01-01 VITALS
BODY MASS INDEX: 22.21 KG/M2 | RESPIRATION RATE: 18 BRPM | WEIGHT: 113.7 LBS | DIASTOLIC BLOOD PRESSURE: 65 MMHG | HEART RATE: 73 BPM | SYSTOLIC BLOOD PRESSURE: 132 MMHG

## 2024-01-01 VITALS
WEIGHT: 125.66 LBS | SYSTOLIC BLOOD PRESSURE: 111 MMHG | BODY MASS INDEX: 23.12 KG/M2 | HEART RATE: 57 BPM | HEIGHT: 62 IN | DIASTOLIC BLOOD PRESSURE: 64 MMHG | OXYGEN SATURATION: 92 % | TEMPERATURE: 97 F | RESPIRATION RATE: 16 BRPM

## 2024-01-01 DIAGNOSIS — F32.1 MODERATE MAJOR DEPRESSION (MULTI): ICD-10-CM

## 2024-01-01 DIAGNOSIS — G30.1 MODERATE LATE ONSET ALZHEIMER'S DEMENTIA WITH MOOD DISTURBANCE (MULTI): ICD-10-CM

## 2024-01-01 DIAGNOSIS — G47.62 SLEEP RELATED LEG CRAMPS: ICD-10-CM

## 2024-01-01 DIAGNOSIS — M79.606 PAIN OF LOWER EXTREMITY, UNSPECIFIED LATERALITY: ICD-10-CM

## 2024-01-01 DIAGNOSIS — I10 ESSENTIAL (PRIMARY) HYPERTENSION: ICD-10-CM

## 2024-01-01 DIAGNOSIS — R00.1 BRADYCARDIA: Primary | ICD-10-CM

## 2024-01-01 DIAGNOSIS — R53.1 WEAKNESS: ICD-10-CM

## 2024-01-01 DIAGNOSIS — F02.B3 MODERATE LATE ONSET ALZHEIMER'S DEMENTIA WITH MOOD DISTURBANCE (MULTI): ICD-10-CM

## 2024-01-01 DIAGNOSIS — Z51.5 HOSPICE CARE: ICD-10-CM

## 2024-01-01 DIAGNOSIS — I24.9 ACS (ACUTE CORONARY SYNDROME) (MULTI): ICD-10-CM

## 2024-01-01 DIAGNOSIS — I10 ESSENTIAL (PRIMARY) HYPERTENSION: Primary | ICD-10-CM

## 2024-01-01 LAB
ALBUMIN SERPL BCP-MCNC: 3.8 G/DL (ref 3.4–5)
ALBUMIN SERPL BCP-MCNC: 4.3 G/DL (ref 3.4–5)
ALP SERPL-CCNC: 112 U/L (ref 33–136)
ALP SERPL-CCNC: 146 U/L (ref 33–136)
ALT SERPL W P-5'-P-CCNC: 20 U/L (ref 7–45)
ALT SERPL W P-5'-P-CCNC: 268 U/L (ref 7–45)
ANION GAP BLDV CALCULATED.4IONS-SCNC: 15 MMOL/L (ref 10–25)
ANION GAP SERPL CALC-SCNC: 14 MMOL/L (ref 10–20)
ANION GAP SERPL CALC-SCNC: 22 MMOL/L
AST SERPL W P-5'-P-CCNC: 253 U/L (ref 9–39)
AST SERPL W P-5'-P-CCNC: 28 U/L (ref 9–39)
BASE EXCESS BLDV CALC-SCNC: -7.2 MMOL/L (ref -2–3)
BASOPHILS # BLD AUTO: 0.03 X10*3/UL (ref 0–0.1)
BASOPHILS NFR BLD AUTO: 0.3 %
BILIRUB SERPL-MCNC: 0.4 MG/DL (ref 0–1.2)
BILIRUB SERPL-MCNC: 0.9 MG/DL (ref 0–1.2)
BODY TEMPERATURE: ABNORMAL
BUN SERPL-MCNC: 14 MG/DL (ref 6–23)
BUN SERPL-MCNC: 30 MG/DL (ref 6–23)
CA-I BLDV-SCNC: 1.04 MMOL/L (ref 1.1–1.33)
CALCIUM SERPL-MCNC: 8.5 MG/DL (ref 8.6–10.3)
CALCIUM SERPL-MCNC: 9.4 MG/DL (ref 8.6–10.3)
CARDIAC TROPONIN I PNL SERPL HS: 2427 NG/L (ref 0–13)
CHLORIDE BLDV-SCNC: 91 MMOL/L (ref 98–107)
CHLORIDE SERPL-SCNC: 88 MMOL/L (ref 98–107)
CHLORIDE SERPL-SCNC: 94 MMOL/L (ref 98–107)
CO2 SERPL-SCNC: 20 MMOL/L (ref 21–32)
CO2 SERPL-SCNC: 28 MMOL/L (ref 21–32)
CREAT SERPL-MCNC: 0.78 MG/DL (ref 0.5–1.05)
CREAT SERPL-MCNC: 1.71 MG/DL (ref 0.5–1.05)
EGFRCR SERPLBLD CKD-EPI 2021: 28 ML/MIN/1.73M*2
EGFRCR SERPLBLD CKD-EPI 2021: 72 ML/MIN/1.73M*2
EOSINOPHIL # BLD AUTO: 0.01 X10*3/UL (ref 0–0.4)
EOSINOPHIL NFR BLD AUTO: 0.1 %
ERYTHROCYTE [DISTWIDTH] IN BLOOD BY AUTOMATED COUNT: 16.7 % (ref 11.5–14.5)
ERYTHROCYTE [DISTWIDTH] IN BLOOD BY AUTOMATED COUNT: 17 % (ref 11.5–14.5)
GLUCOSE BLDV-MCNC: 171 MG/DL (ref 74–99)
GLUCOSE SERPL-MCNC: 167 MG/DL (ref 74–99)
GLUCOSE SERPL-MCNC: 86 MG/DL (ref 74–99)
HCO3 BLDV-SCNC: 20.8 MMOL/L (ref 22–26)
HCT VFR BLD AUTO: 32.7 % (ref 36–46)
HCT VFR BLD AUTO: 36.8 % (ref 36–46)
HCT VFR BLD EST: 36 % (ref 36–46)
HGB BLD-MCNC: 10.2 G/DL (ref 12–16)
HGB BLD-MCNC: 11.3 G/DL (ref 12–16)
HGB BLDV-MCNC: 11.9 G/DL (ref 12–16)
HOLD SPECIMEN: NORMAL
IMM GRANULOCYTES # BLD AUTO: 0.08 X10*3/UL (ref 0–0.5)
IMM GRANULOCYTES NFR BLD AUTO: 0.8 % (ref 0–0.9)
INHALED O2 CONCENTRATION: 32 %
LACTATE BLDV-SCNC: 6.6 MMOL/L (ref 0.4–2)
LYMPHOCYTES # BLD AUTO: 3.91 X10*3/UL (ref 0.8–3)
LYMPHOCYTES NFR BLD AUTO: 40 %
MAGNESIUM SERPL-MCNC: 2.37 MG/DL (ref 1.6–2.4)
MAGNESIUM SERPL-MCNC: 3.09 MG/DL (ref 1.6–2.4)
MCH RBC QN AUTO: 25 PG (ref 26–34)
MCH RBC QN AUTO: 25.6 PG (ref 26–34)
MCHC RBC AUTO-ENTMCNC: 30.7 G/DL (ref 32–36)
MCHC RBC AUTO-ENTMCNC: 31.2 G/DL (ref 32–36)
MCV RBC AUTO: 81 FL (ref 80–100)
MCV RBC AUTO: 82 FL (ref 80–100)
MONOCYTES # BLD AUTO: 0.86 X10*3/UL (ref 0.05–0.8)
MONOCYTES NFR BLD AUTO: 8.8 %
NEUTROPHILS # BLD AUTO: 4.89 X10*3/UL (ref 1.6–5.5)
NEUTROPHILS NFR BLD AUTO: 50 %
NRBC BLD-RTO: 0 /100 WBCS (ref 0–0)
NRBC BLD-RTO: 0 /100 WBCS (ref 0–0)
OXYHGB MFR BLDV: 23.7 % (ref 45–75)
PCO2 BLDV: 52 MM HG (ref 41–51)
PH BLDV: 7.21 PH (ref 7.33–7.43)
PLATELET # BLD AUTO: 197 X10*3/UL (ref 150–450)
PLATELET # BLD AUTO: 289 X10*3/UL (ref 150–450)
PO2 BLDV: 22 MM HG (ref 35–45)
POTASSIUM BLDV-SCNC: 5.2 MMOL/L (ref 3.5–5.3)
POTASSIUM SERPL-SCNC: 4.2 MMOL/L (ref 3.5–5.3)
POTASSIUM SERPL-SCNC: 5.6 MMOL/L (ref 3.5–5.3)
PROT SERPL-MCNC: 7.6 G/DL (ref 6.4–8.2)
PROT SERPL-MCNC: 7.9 G/DL (ref 6.4–8.2)
RBC # BLD AUTO: 3.99 X10*6/UL (ref 4–5.2)
RBC # BLD AUTO: 4.52 X10*6/UL (ref 4–5.2)
SAO2 % BLDV: 24 % (ref 45–75)
SODIUM BLDV-SCNC: 122 MMOL/L (ref 136–145)
SODIUM SERPL-SCNC: 124 MMOL/L (ref 136–145)
SODIUM SERPL-SCNC: 132 MMOL/L (ref 136–145)
T4 FREE SERPL-MCNC: 0.78 NG/DL (ref 0.61–1.12)
TSH SERPL-ACNC: 40.1 MIU/L (ref 0.44–3.98)
WBC # BLD AUTO: 6.5 X10*3/UL (ref 4.4–11.3)
WBC # BLD AUTO: 9.8 X10*3/UL (ref 4.4–11.3)

## 2024-01-01 PROCEDURE — 99238 HOSP IP/OBS DSCHRG MGMT 30/<: CPT | Performed by: INTERNAL MEDICINE

## 2024-01-01 PROCEDURE — 71045 X-RAY EXAM CHEST 1 VIEW: CPT

## 2024-01-01 PROCEDURE — 85027 COMPLETE CBC AUTOMATED: CPT | Mod: OUT | Performed by: FAMILY MEDICINE

## 2024-01-01 PROCEDURE — 84439 ASSAY OF FREE THYROXINE: CPT | Performed by: EMERGENCY MEDICINE

## 2024-01-01 PROCEDURE — 36415 COLL VENOUS BLD VENIPUNCTURE: CPT | Performed by: EMERGENCY MEDICINE

## 2024-01-01 PROCEDURE — 2500000004 HC RX 250 GENERAL PHARMACY W/ HCPCS (ALT 636 FOR OP/ED): Performed by: INTERNAL MEDICINE

## 2024-01-01 PROCEDURE — 2500000002 HC RX 250 W HCPCS SELF ADMINISTERED DRUGS (ALT 637 FOR MEDICARE OP, ALT 636 FOR OP/ED): Performed by: INTERNAL MEDICINE

## 2024-01-01 PROCEDURE — 80053 COMPREHEN METABOLIC PANEL: CPT | Mod: OUT | Performed by: FAMILY MEDICINE

## 2024-01-01 PROCEDURE — 84132 ASSAY OF SERUM POTASSIUM: CPT | Performed by: EMERGENCY MEDICINE

## 2024-01-01 PROCEDURE — 85025 COMPLETE CBC W/AUTO DIFF WBC: CPT | Performed by: EMERGENCY MEDICINE

## 2024-01-01 PROCEDURE — 99291 CRITICAL CARE FIRST HOUR: CPT | Mod: 25 | Performed by: EMERGENCY MEDICINE

## 2024-01-01 PROCEDURE — 96376 TX/PRO/DX INJ SAME DRUG ADON: CPT

## 2024-01-01 PROCEDURE — 74176 CT ABD & PELVIS W/O CONTRAST: CPT

## 2024-01-01 PROCEDURE — 2500000001 HC RX 250 WO HCPCS SELF ADMINISTERED DRUGS (ALT 637 FOR MEDICARE OP): Performed by: INTERNAL MEDICINE

## 2024-01-01 PROCEDURE — 83735 ASSAY OF MAGNESIUM: CPT | Performed by: EMERGENCY MEDICINE

## 2024-01-01 PROCEDURE — 96361 HYDRATE IV INFUSION ADD-ON: CPT

## 2024-01-01 PROCEDURE — 96375 TX/PRO/DX INJ NEW DRUG ADDON: CPT

## 2024-01-01 PROCEDURE — 2500000004 HC RX 250 GENERAL PHARMACY W/ HCPCS (ALT 636 FOR OP/ED): Performed by: NURSE PRACTITIONER

## 2024-01-01 PROCEDURE — 1100000001 HC PRIVATE ROOM DAILY

## 2024-01-01 PROCEDURE — 83735 ASSAY OF MAGNESIUM: CPT | Mod: OUT | Performed by: FAMILY MEDICINE

## 2024-01-01 PROCEDURE — 71045 X-RAY EXAM CHEST 1 VIEW: CPT | Mod: FOREIGN READ | Performed by: RADIOLOGY

## 2024-01-01 PROCEDURE — 74176 CT ABD & PELVIS W/O CONTRAST: CPT | Mod: FOREIGN READ | Performed by: RADIOLOGY

## 2024-01-01 PROCEDURE — 84443 ASSAY THYROID STIM HORMONE: CPT | Performed by: EMERGENCY MEDICINE

## 2024-01-01 PROCEDURE — 84484 ASSAY OF TROPONIN QUANT: CPT | Performed by: EMERGENCY MEDICINE

## 2024-01-01 PROCEDURE — 99349 HOME/RES VST EST MOD MDM 40: CPT | Performed by: FAMILY MEDICINE

## 2024-01-01 PROCEDURE — 93005 ELECTROCARDIOGRAM TRACING: CPT

## 2024-01-01 PROCEDURE — 71250 CT THORAX DX C-: CPT | Mod: FOREIGN READ | Performed by: RADIOLOGY

## 2024-01-01 PROCEDURE — 2500000004 HC RX 250 GENERAL PHARMACY W/ HCPCS (ALT 636 FOR OP/ED)

## 2024-01-01 PROCEDURE — 96374 THER/PROPH/DIAG INJ IV PUSH: CPT

## 2024-01-01 PROCEDURE — 2500000004 HC RX 250 GENERAL PHARMACY W/ HCPCS (ALT 636 FOR OP/ED): Performed by: STUDENT IN AN ORGANIZED HEALTH CARE EDUCATION/TRAINING PROGRAM

## 2024-01-01 PROCEDURE — 99223 1ST HOSP IP/OBS HIGH 75: CPT | Performed by: INTERNAL MEDICINE

## 2024-01-01 RX ORDER — INFANT FORMULA WITH IRON
1 POWDER (GRAM) ORAL AS NEEDED
COMMUNITY

## 2024-01-01 RX ORDER — AMOXICILLIN 250 MG
1 CAPSULE ORAL DAILY PRN
Status: DISCONTINUED | OUTPATIENT
Start: 2024-01-01 | End: 2024-01-01 | Stop reason: HOSPADM

## 2024-01-01 RX ORDER — SODIUM CHLORIDE 9 MG/ML
60 INJECTION, SOLUTION INTRAVENOUS CONTINUOUS
Status: DISCONTINUED | OUTPATIENT
Start: 2024-01-01 | End: 2024-01-01 | Stop reason: HOSPADM

## 2024-01-01 RX ORDER — MORPHINE SULFATE 20 MG/ML
0.25 SOLUTION ORAL EVERY 4 HOURS PRN
COMMUNITY

## 2024-01-01 RX ORDER — LIDOCAINE 560 MG/1
1 PATCH PERCUTANEOUS; TOPICAL; TRANSDERMAL DAILY
COMMUNITY

## 2024-01-01 RX ORDER — MORPHINE SULFATE 2 MG/ML
2 INJECTION, SOLUTION INTRAMUSCULAR; INTRAVENOUS ONCE
Status: COMPLETED | OUTPATIENT
Start: 2024-01-01 | End: 2024-01-01

## 2024-01-01 RX ORDER — LEVOTHYROXINE SODIUM 88 UG/1
88 TABLET ORAL
Status: DISCONTINUED | OUTPATIENT
Start: 2024-01-01 | End: 2024-01-01

## 2024-01-01 RX ORDER — AMOXICILLIN 250 MG
1 CAPSULE ORAL DAILY PRN
COMMUNITY
Start: 2023-01-01

## 2024-01-01 RX ORDER — MAGNESIUM HYDROXIDE 2400 MG/10ML
10 SUSPENSION ORAL
Status: DISCONTINUED | OUTPATIENT
Start: 2024-01-01 | End: 2024-01-01 | Stop reason: HOSPADM

## 2024-01-01 RX ORDER — MORPHINE SULFATE 10 MG/.5ML
10 SOLUTION ORAL
Status: DISCONTINUED | OUTPATIENT
Start: 2024-01-01 | End: 2024-01-01 | Stop reason: HOSPADM

## 2024-01-01 RX ORDER — ACETAMINOPHEN 500 MG
500 TABLET ORAL 3 TIMES DAILY
COMMUNITY

## 2024-01-01 RX ORDER — ASPIRIN 81 MG/1
81 TABLET ORAL 2 TIMES DAILY
COMMUNITY

## 2024-01-01 RX ORDER — GLYCOPYRROLATE 0.2 MG/ML
0.2 INJECTION INTRAMUSCULAR; INTRAVENOUS EVERY 4 HOURS PRN
Status: DISCONTINUED | OUTPATIENT
Start: 2024-01-01 | End: 2024-01-01 | Stop reason: HOSPADM

## 2024-01-01 RX ORDER — LORAZEPAM 0.5 MG/1
0.25 TABLET ORAL EVERY 4 HOURS PRN
COMMUNITY

## 2024-01-01 RX ORDER — ONDANSETRON HYDROCHLORIDE 2 MG/ML
INJECTION, SOLUTION INTRAVENOUS
Status: COMPLETED
Start: 2024-01-01 | End: 2024-01-01

## 2024-01-01 RX ORDER — LORAZEPAM 0.5 MG/1
0.25 TABLET ORAL EVERY 4 HOURS PRN
Status: DISCONTINUED | OUTPATIENT
Start: 2024-01-01 | End: 2024-01-01 | Stop reason: HOSPADM

## 2024-01-01 RX ORDER — MORPHINE SULFATE 10 MG/.5ML
5 SOLUTION ORAL
Status: DISCONTINUED | OUTPATIENT
Start: 2024-01-01 | End: 2024-01-01 | Stop reason: HOSPADM

## 2024-01-01 RX ORDER — HALOPERIDOL 2 MG/ML
1 SOLUTION ORAL EVERY 8 HOURS PRN
Status: DISCONTINUED | OUTPATIENT
Start: 2024-01-01 | End: 2024-01-01 | Stop reason: HOSPADM

## 2024-01-01 RX ORDER — ONDANSETRON HYDROCHLORIDE 2 MG/ML
4 INJECTION, SOLUTION INTRAVENOUS ONCE
Status: COMPLETED | OUTPATIENT
Start: 2024-01-01 | End: 2024-01-01

## 2024-01-01 RX ORDER — LORAZEPAM 0.5 MG/1
0.5 TABLET ORAL EVERY 4 HOURS PRN
Status: DISCONTINUED | OUTPATIENT
Start: 2024-01-01 | End: 2024-01-01 | Stop reason: HOSPADM

## 2024-01-01 RX ORDER — POLYVINYL ALCOHOL 14 MG/ML
1 SOLUTION/ DROPS OPHTHALMIC EVERY 8 HOURS PRN
COMMUNITY

## 2024-01-01 RX ORDER — MORPHINE SULFATE 2 MG/ML
INJECTION, SOLUTION INTRAMUSCULAR; INTRAVENOUS
Status: COMPLETED
Start: 2024-01-01 | End: 2024-01-01

## 2024-01-01 RX ORDER — DULOXETIN HYDROCHLORIDE 30 MG/1
30 CAPSULE, DELAYED RELEASE ORAL 2 TIMES DAILY
Status: DISCONTINUED | OUTPATIENT
Start: 2024-01-01 | End: 2024-01-01

## 2024-01-01 RX ORDER — ASPIRIN 81 MG/1
81 TABLET ORAL 2 TIMES DAILY
Status: DISCONTINUED | OUTPATIENT
Start: 2024-01-01 | End: 2024-01-01 | Stop reason: HOSPADM

## 2024-01-01 RX ORDER — LIDOCAINE 560 MG/1
1 PATCH PERCUTANEOUS; TOPICAL; TRANSDERMAL DAILY
Status: DISCONTINUED | OUTPATIENT
Start: 2024-01-01 | End: 2024-01-01 | Stop reason: HOSPADM

## 2024-01-01 RX ORDER — DONEPEZIL HYDROCHLORIDE 5 MG/1
10 TABLET, FILM COATED ORAL NIGHTLY
Status: DISCONTINUED | OUTPATIENT
Start: 2024-01-01 | End: 2024-01-01 | Stop reason: HOSPADM

## 2024-01-01 RX ORDER — SCOLOPAMINE TRANSDERMAL SYSTEM 1 MG/1
1 PATCH, EXTENDED RELEASE TRANSDERMAL
Status: DISCONTINUED | OUTPATIENT
Start: 2024-01-01 | End: 2024-01-01 | Stop reason: HOSPADM

## 2024-01-01 RX ORDER — FAMOTIDINE 20 MG/1
10 TABLET, FILM COATED ORAL NIGHTLY
Status: DISCONTINUED | OUTPATIENT
Start: 2024-01-01 | End: 2024-01-01 | Stop reason: HOSPADM

## 2024-01-01 RX ORDER — SODIUM CHLORIDE, SODIUM LACTATE, POTASSIUM CHLORIDE, CALCIUM CHLORIDE 600; 310; 30; 20 MG/100ML; MG/100ML; MG/100ML; MG/100ML
100 INJECTION, SOLUTION INTRAVENOUS CONTINUOUS
Status: CANCELLED | OUTPATIENT
Start: 2024-01-01

## 2024-01-01 RX ORDER — MORPHINE SULFATE 4 MG/ML
INJECTION INTRAVENOUS
Status: COMPLETED
Start: 2024-01-01 | End: 2024-01-01

## 2024-01-01 RX ORDER — DEXAMETHASONE SODIUM PHOSPHATE 10 MG/ML
4 INJECTION INTRAMUSCULAR; INTRAVENOUS ONCE
Status: COMPLETED | OUTPATIENT
Start: 2024-01-01 | End: 2024-01-01

## 2024-01-01 RX ORDER — ACETAMINOPHEN 325 MG/1
500 TABLET ORAL 3 TIMES DAILY
Status: DISCONTINUED | OUTPATIENT
Start: 2024-01-01 | End: 2024-01-01 | Stop reason: HOSPADM

## 2024-01-01 RX ORDER — POLYETHYLENE GLYCOL 3350 17 G/17G
17 POWDER, FOR SOLUTION ORAL EVERY OTHER DAY
Status: DISCONTINUED | OUTPATIENT
Start: 2024-01-01 | End: 2024-01-01 | Stop reason: HOSPADM

## 2024-01-01 RX ORDER — MORPHINE SULFATE 4 MG/ML
4 INJECTION INTRAVENOUS ONCE
Status: COMPLETED | OUTPATIENT
Start: 2024-01-01 | End: 2024-01-01

## 2024-01-01 RX ORDER — AMLODIPINE BESYLATE 5 MG/1
10 TABLET ORAL DAILY
Status: DISCONTINUED | OUTPATIENT
Start: 2024-01-01 | End: 2024-01-01 | Stop reason: HOSPADM

## 2024-01-01 RX ORDER — ACETAMINOPHEN 500 MG
2 TABLET ORAL NIGHTLY
COMMUNITY

## 2024-01-01 RX ORDER — LORATADINE 10 MG/1
10 TABLET ORAL NIGHTLY
Status: DISCONTINUED | OUTPATIENT
Start: 2024-01-01 | End: 2024-01-01 | Stop reason: HOSPADM

## 2024-01-01 RX ORDER — LATANOPROST 50 UG/ML
1 SOLUTION/ DROPS OPHTHALMIC NIGHTLY
Status: DISCONTINUED | OUTPATIENT
Start: 2024-01-01 | End: 2024-01-01 | Stop reason: HOSPADM

## 2024-01-01 RX ORDER — ACETAMINOPHEN 500 MG
10 TABLET ORAL NIGHTLY
Status: DISCONTINUED | OUTPATIENT
Start: 2024-01-01 | End: 2024-01-01 | Stop reason: HOSPADM

## 2024-01-01 RX ORDER — GABAPENTIN 100 MG/1
100 CAPSULE ORAL NIGHTLY
Status: DISCONTINUED | OUTPATIENT
Start: 2024-01-01 | End: 2024-01-01 | Stop reason: HOSPADM

## 2024-01-01 RX ORDER — NAPROXEN SODIUM 220 MG
220 TABLET ORAL
COMMUNITY
End: 2024-01-01 | Stop reason: HOSPADM

## 2024-01-01 RX ORDER — PANTOPRAZOLE SODIUM 40 MG/1
40 TABLET, DELAYED RELEASE ORAL
Status: DISCONTINUED | OUTPATIENT
Start: 2024-01-01 | End: 2024-01-01 | Stop reason: HOSPADM

## 2024-01-01 RX ORDER — DULOXETIN HYDROCHLORIDE 30 MG/1
30 CAPSULE, DELAYED RELEASE ORAL DAILY
Status: DISCONTINUED | OUTPATIENT
Start: 2024-01-01 | End: 2024-01-01 | Stop reason: HOSPADM

## 2024-01-01 RX ORDER — LEVOTHYROXINE SODIUM 100 UG/1
100 TABLET ORAL
Status: DISCONTINUED | OUTPATIENT
Start: 2024-01-01 | End: 2024-01-01 | Stop reason: HOSPADM

## 2024-01-01 RX ADMIN — MORPHINE SULFATE 2 MG: 2 INJECTION, SOLUTION INTRAMUSCULAR; INTRAVENOUS at 22:43

## 2024-01-01 RX ADMIN — ASPIRIN 81 MG: 81 TABLET, COATED ORAL at 08:34

## 2024-01-01 RX ADMIN — MORPHINE SULFATE 4 MG: 4 INJECTION INTRAVENOUS at 23:24

## 2024-01-01 RX ADMIN — MORPHINE SULFATE 2 MG: 2 INJECTION, SOLUTION INTRAMUSCULAR; INTRAVENOUS at 23:05

## 2024-01-01 RX ADMIN — SODIUM CHLORIDE 60 ML/HR: 9 INJECTION, SOLUTION INTRAVENOUS at 06:50

## 2024-01-01 RX ADMIN — ONDANSETRON HYDROCHLORIDE 4 MG: 2 INJECTION, SOLUTION INTRAVENOUS at 22:06

## 2024-01-01 RX ADMIN — SCOPOLAMINE 1 PATCH: 1.5 PATCH, EXTENDED RELEASE TRANSDERMAL at 02:29

## 2024-01-01 RX ADMIN — DEXAMETHASONE SODIUM PHOSPHATE 4 MG: 10 INJECTION, SOLUTION INTRAMUSCULAR; INTRAVENOUS at 08:36

## 2024-01-01 RX ADMIN — ACETAMINOPHEN 487.5 MG: 325 TABLET ORAL at 08:34

## 2024-01-01 RX ADMIN — LEVOTHYROXINE SODIUM 100 MCG: 100 TABLET ORAL at 06:48

## 2024-01-01 RX ADMIN — ONDANSETRON 4 MG: 2 INJECTION INTRAMUSCULAR; INTRAVENOUS at 22:43

## 2024-01-01 RX ADMIN — ONDANSETRON HYDROCHLORIDE 4 MG: 2 INJECTION, SOLUTION INTRAVENOUS at 22:43

## 2024-01-01 RX ADMIN — AMLODIPINE BESYLATE 10 MG: 5 TABLET ORAL at 08:34

## 2024-01-01 RX ADMIN — PANTOPRAZOLE SODIUM 40 MG: 40 TABLET, DELAYED RELEASE ORAL at 06:48

## 2024-01-01 RX ADMIN — DULOXETINE HYDROCHLORIDE 30 MG: 30 CAPSULE, DELAYED RELEASE ORAL at 08:34

## 2024-01-01 RX ADMIN — SODIUM CHLORIDE, POTASSIUM CHLORIDE, SODIUM LACTATE AND CALCIUM CHLORIDE 1000 ML: 600; 310; 30; 20 INJECTION, SOLUTION INTRAVENOUS at 21:35

## 2024-01-01 RX ADMIN — ONDANSETRON 4 MG: 2 INJECTION INTRAMUSCULAR; INTRAVENOUS at 22:06

## 2024-01-01 SDOH — SOCIAL STABILITY: SOCIAL INSECURITY: ABUSE: ADULT

## 2024-01-01 SDOH — SOCIAL STABILITY: SOCIAL INSECURITY: ARE YOU OR HAVE YOU BEEN THREATENED OR ABUSED PHYSICALLY, EMOTIONALLY, OR SEXUALLY BY ANYONE?: NO

## 2024-01-01 SDOH — SOCIAL STABILITY: SOCIAL INSECURITY: HAS ANYONE EVER THREATENED TO HURT YOUR FAMILY OR YOUR PETS?: NO

## 2024-01-01 SDOH — SOCIAL STABILITY: SOCIAL INSECURITY: WERE YOU ABLE TO COMPLETE ALL THE BEHAVIORAL HEALTH SCREENINGS?: YES

## 2024-01-01 SDOH — SOCIAL STABILITY: SOCIAL INSECURITY: DO YOU FEEL UNSAFE GOING BACK TO THE PLACE WHERE YOU ARE LIVING?: NO

## 2024-01-01 SDOH — SOCIAL STABILITY: SOCIAL INSECURITY: DO YOU FEEL ANYONE HAS EXPLOITED OR TAKEN ADVANTAGE OF YOU FINANCIALLY OR OF YOUR PERSONAL PROPERTY?: NO

## 2024-01-01 SDOH — SOCIAL STABILITY: SOCIAL INSECURITY: DOES ANYONE TRY TO KEEP YOU FROM HAVING/CONTACTING OTHER FRIENDS OR DOING THINGS OUTSIDE YOUR HOME?: NO

## 2024-01-01 SDOH — SOCIAL STABILITY: SOCIAL INSECURITY: HAVE YOU HAD THOUGHTS OF HARMING ANYONE ELSE?: NO

## 2024-01-01 SDOH — SOCIAL STABILITY: SOCIAL INSECURITY: ARE THERE ANY APPARENT SIGNS OF INJURIES/BEHAVIORS THAT COULD BE RELATED TO ABUSE/NEGLECT?: NO

## 2024-01-01 ASSESSMENT — ACTIVITIES OF DAILY LIVING (ADL)
FEEDING YOURSELF: INDEPENDENT
HEARING - RIGHT EAR: FUNCTIONAL
LACK_OF_TRANSPORTATION: PATIENT DECLINED
WALKS IN HOME: NEEDS ASSISTANCE
BATHING: NEEDS ASSISTANCE
GROOMING: NEEDS ASSISTANCE
TOILETING: NEEDS ASSISTANCE
DRESSING YOURSELF: NEEDS ASSISTANCE
ASSISTIVE_DEVICE: WALKER
HEARING - LEFT EAR: FUNCTIONAL
JUDGMENT_ADEQUATE_SAFELY_COMPLETE_DAILY_ACTIVITIES: YES
PATIENT'S MEMORY ADEQUATE TO SAFELY COMPLETE DAILY ACTIVITIES?: YES
ADEQUATE_TO_COMPLETE_ADL: YES

## 2024-01-01 ASSESSMENT — LIFESTYLE VARIABLES
HOW OFTEN DO YOU HAVE A DRINK CONTAINING ALCOHOL: NEVER
AUDIT-C TOTAL SCORE: 0
SKIP TO QUESTIONS 9-10: 1
HOW OFTEN DO YOU HAVE 6 OR MORE DRINKS ON ONE OCCASION: NEVER
HAVE PEOPLE ANNOYED YOU BY CRITICIZING YOUR DRINKING: NO
AUDIT-C TOTAL SCORE: 0
EVER FELT BAD OR GUILTY ABOUT YOUR DRINKING: NO
HAVE YOU EVER FELT YOU SHOULD CUT DOWN ON YOUR DRINKING: NO
HOW MANY STANDARD DRINKS CONTAINING ALCOHOL DO YOU HAVE ON A TYPICAL DAY: PATIENT DOES NOT DRINK
EVER HAD A DRINK FIRST THING IN THE MORNING TO STEADY YOUR NERVES TO GET RID OF A HANGOVER: NO

## 2024-01-01 ASSESSMENT — COGNITIVE AND FUNCTIONAL STATUS - GENERAL
WALKING IN HOSPITAL ROOM: A LITTLE
MOVING FROM LYING ON BACK TO SITTING ON SIDE OF FLAT BED WITH BEDRAILS: A LITTLE
TOILETING: A LITTLE
DRESSING REGULAR UPPER BODY CLOTHING: A LITTLE
PATIENT BASELINE BEDBOUND: NO
WALKING IN HOSPITAL ROOM: A LITTLE
MOBILITY SCORE: 17
CLIMB 3 TO 5 STEPS WITH RAILING: A LOT
STANDING UP FROM CHAIR USING ARMS: A LITTLE
TOILETING: A LITTLE
DRESSING REGULAR LOWER BODY CLOTHING: A LITTLE
DRESSING REGULAR UPPER BODY CLOTHING: A LITTLE
EATING MEALS: A LITTLE
STANDING UP FROM CHAIR USING ARMS: A LITTLE
CLIMB 3 TO 5 STEPS WITH RAILING: A LOT
TURNING FROM BACK TO SIDE WHILE IN FLAT BAD: A LITTLE
MOVING FROM LYING ON BACK TO SITTING ON SIDE OF FLAT BED WITH BEDRAILS: A LITTLE
MOVING TO AND FROM BED TO CHAIR: A LITTLE
HELP NEEDED FOR BATHING: A LITTLE
EATING MEALS: A LITTLE
TURNING FROM BACK TO SIDE WHILE IN FLAT BAD: A LITTLE
HELP NEEDED FOR BATHING: A LITTLE
MOBILITY SCORE: 17
MOVING TO AND FROM BED TO CHAIR: A LITTLE
DRESSING REGULAR LOWER BODY CLOTHING: A LITTLE
DAILY ACTIVITIY SCORE: 19
DAILY ACTIVITIY SCORE: 19

## 2024-01-01 ASSESSMENT — PAIN - FUNCTIONAL ASSESSMENT
PAIN_FUNCTIONAL_ASSESSMENT: 0-10

## 2024-01-01 ASSESSMENT — COLUMBIA-SUICIDE SEVERITY RATING SCALE - C-SSRS
2. HAVE YOU ACTUALLY HAD ANY THOUGHTS OF KILLING YOURSELF?: NO
2. HAVE YOU ACTUALLY HAD ANY THOUGHTS OF KILLING YOURSELF?: NO
6. HAVE YOU EVER DONE ANYTHING, STARTED TO DO ANYTHING, OR PREPARED TO DO ANYTHING TO END YOUR LIFE?: NO
1. IN THE PAST MONTH, HAVE YOU WISHED YOU WERE DEAD OR WISHED YOU COULD GO TO SLEEP AND NOT WAKE UP?: NO
6. HAVE YOU EVER DONE ANYTHING, STARTED TO DO ANYTHING, OR PREPARED TO DO ANYTHING TO END YOUR LIFE?: NO
1. IN THE PAST MONTH, HAVE YOU WISHED YOU WERE DEAD OR WISHED YOU COULD GO TO SLEEP AND NOT WAKE UP?: NO

## 2024-01-01 ASSESSMENT — PAIN SCALES - GENERAL
PAINLEVEL_OUTOF10: 0 - NO PAIN
PAINLEVEL_OUTOF10: 0 - NO PAIN
PAINLEVEL_OUTOF10: 7
PAINLEVEL_OUTOF10: 0 - NO PAIN

## 2024-01-01 ASSESSMENT — PATIENT HEALTH QUESTIONNAIRE - PHQ9
SUM OF ALL RESPONSES TO PHQ9 QUESTIONS 1 & 2: 0
1. LITTLE INTEREST OR PLEASURE IN DOING THINGS: NOT AT ALL
2. FEELING DOWN, DEPRESSED OR HOPELESS: NOT AT ALL

## 2024-01-01 ASSESSMENT — ENCOUNTER SYMPTOMS
UNEXPECTED WEIGHT CHANGE: 0
APPETITE CHANGE: 0
NAUSEA: 0

## 2024-01-04 PROBLEM — F02.B3 MODERATE ALZHEIMER'S DEMENTIA WITH MOOD DISTURBANCE (MULTI): Status: ACTIVE | Noted: 2024-01-01

## 2024-01-04 PROBLEM — G30.9 MODERATE ALZHEIMER'S DEMENTIA WITH MOOD DISTURBANCE (MULTI): Status: ACTIVE | Noted: 2024-01-01

## 2024-01-04 NOTE — PROGRESS NOTES
Subjective   Patient ID: Amena Chapa is a 89 y.o. female who is assisted living/ home patient being seen and evaluated for multiple medical problems.    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   Eyes:  Negative for visual disturbance.   Gastrointestinal:  Negative for nausea.       Objective   /65   Pulse 73   Resp 18   Wt 51.6 kg (113 lb 11.2 oz)   BMI 22.21 kg/m²     Physical Exam  Constitutional:       General: She is not in acute distress.     Appearance: Normal appearance.   HENT:      Head: Normocephalic and atraumatic.   Eyes:      Conjunctiva/sclera: Conjunctivae normal.   Cardiovascular:      Rate and Rhythm: Normal rate and regular rhythm.   Pulmonary:      Effort: Pulmonary effort is normal.      Breath sounds: Normal breath sounds.   Abdominal:      Palpations: Abdomen is soft.   Musculoskeletal:      Cervical back: Neck supple.   Lymphadenopathy:      Cervical: No cervical adenopathy.   Neurological:      Mental Status: She is alert.         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

## 2024-02-21 PROBLEM — R00.1 BRADYCARDIA: Status: ACTIVE | Noted: 2024-01-01

## 2024-02-21 PROBLEM — R00.1 BRADYCARDIA: Status: RESOLVED | Noted: 2024-01-01 | Resolved: 2024-01-01

## 2024-02-21 NOTE — PROGRESS NOTES
The patient was seen by the midlevel/resident.  I have personally saw the patient and made/approved the management plan and take responsibility for the patient management.  I reviewed the EKG's (when done) and agree with the interpretation.  I have seen and examined the patient; agree with the workup, evaluation, MDM, and diagnosis.  The care plan has been discussed with the midlevel/resident; I have reviewed the note and agree with the documented findings.     Patient was at the nursing home where she is in hospice care.  She said she had some pain and the nurse gave her some Naprosyn and then went back to check on her and her heart rate was in the 30s she is not responding appropriately.  EMS arrived.  They paced her and brought her to the emergency room.  We attempted to find DNR information from the nursing home and they were unable to provide it only saying that she is in hospice care.  She is not able give any history.  We able to stop pacing and her heart rhythm is normal sinus similar to previous at times goes back into an irregular slow rhythm.  We attempted to reach family members by phone and there is no answer on either number.  We will proceed with DNR that we have from 2 years ago and are attempting to get information from the hospice.    Patient worked up in ED with chest x-ray labs found to be acidotic with a low pH.  Initial call was for seizure which would explain this.  Plan is IV fluids and hospitalization.    Prolonged conversation with family patient will be kept comfortable given a total of 8 mg of morphine in the emergency department.  Hospice nurse came by and talk to the patient as well.  Patient will be hospitalized to keep her comfortable under hospice service.  ED Course as of 02/21/24 0006   Tue Feb 20, 2024 2128 EKG done at 2114 interpreted by me shows sinus rhythm with a prolonged  ms with left axis deviation right bundle this is similar to previous EKG from November 30, 2023  no new ischemia. [RZ]   2222 Discussed with both son and daughter-in-law at the bedside about what to do if patient comes bradycardic in the send cardiac pacing.  They stated that given her DNR CCA, they would like to forego cardiac pacing and only stick with medications such as atropine in an attempt to raise her heart rate.  They would like to prioritize comfort, and will continue with a DNR CCA CODE STATUS at this time. [JV]   2240 I had prolonged discussion with the son and daughter at the bedside.  We talked about options and the hospice nurse was present for the discussion.  Plan at this time is to keep her comfortable with morphine and Zofran and try alleviate her leg discomfort.  She frequently has leg pain.  She is also nauseous.  Family does not want us to be aggressive cardiac wise.  They understand that the patient may have had an MI.  Instead they like the patient to not have repeat EKGs and lab work and to instead be kept comfortable. [RZ]   2308 Further discussion with the family they would like patient to be kept comfortable i.e. pain medication and not have a cardiac workup for her ACS.  As result we did not repeat EKG or repeat troponin.  Will attempt to hospitalize under the hospice service. [RZ]      ED Course User Index  [JV] Say Santos MD  [RZ] Terrell Weinstein MD         Diagnoses as of 02/21/24 0006   Bradycardia   ACS (acute coronary syndrome) (CMS/[facility])   Hospice care   Pain of lower extremity, unspecified laterality     Terrell Weinstein MD

## 2024-02-21 NOTE — DISCHARGE SUMMARY
Discharge Diagnosis  Bradycardia    Issues Requiring Follow-Up  Bradycardia, hypothyroidism    Discharge Meds     Your medication list        CONTINUE taking these medications        Instructions Last Dose Given Next Dose Due   acetaminophen 500 mg tablet  Commonly known as: Tylenol           amLODIPine 10 mg tablet  Commonly known as: Norvasc           aspirin 81 mg EC tablet           DULoxetine 30 mg DR capsule  Commonly known as: Cymbalta           famotidine 10 mg tablet  Commonly known as: Pepcid           gabapentin 100 mg capsule  Commonly known as: Neurontin           hydrocortisone 1 % cream           latanoprost 0.005 % ophthalmic solution  Commonly known as: Xalatan           levothyroxine 88 mcg tablet  Commonly known as: Synthroid, Levoxyl           lidocaine 4 % patch           loperamide 2 mg capsule  Commonly known as: Imodium           loratadine 10 mg tablet  Commonly known as: Claritin           LORazepam 0.5 mg tablet  Commonly known as: Ativan           magnesium hydroxide 400 mg/5 mL suspension  Commonly known as: Milk of Magnesia           melatonin 5 mg tablet           morphine 20 mg/mL concentrated oral solution           omeprazole 40 mg DR capsule  Commonly known as: PriLOSEC           ondansetron 4 mg tablet  Commonly known as: Zofran           polyethylene glycol 17 gram packet  Commonly known as: Glycolax, Miralax           polyvinyl alcohol 1.4 % ophthalmic solution  Commonly known as: Liquifilm Tears           sennosides-docusate sodium 8.6-50 mg tablet  Commonly known as: Dawna-Colace           triamcinolone 0.1 % cream  Commonly known as: Kenalog           vitamin A & D ointment                  STOP taking these medications      donepezil 10 mg tablet  Commonly known as: Aricept        Flanax (naproxen) 220 mg tablet  Generic drug: naproxen sodium                 Test Results Pending At Discharge  Pending Labs       No current pending labs.            Hospital Course   Ms  Sammi is an elderly woman, enrolled in hospice, who was brought to ER last evening when staff at the AL facility felt she was acting not herself, and noted that her pulse was very low. Squad was called and she was brought in. In ER, a TCP was placed temporarily,  until it was clarified that she is a dnr/comfort care, with family being certain they wanted to continue with hospice and the dnr standing. We did discontinue her aricept, joshua was comfortable with this, as it can at times contribute to bradycardia. I also discussed with them that her TSH is elevated , around 41, and that an increase in synthroid may be helpful as well. In regards to this, they want to wait and speak with their PCP, I have talked with hospice. They will readmit her when she returns to her facility today Follow up with PCP within the next week if possible    Pertinent Physical Exam At Time of Discharge  Physical Exam  She is alert, but appears fatigued. Color, turgor fair. Zakiya. No jvd. Lungs are clear. Heart is regular at this time. Abdomen has bowel sounds, is slightly tender in luq and rlq. No edema. Pulses faint but present  Outpatient Follow-Up  No future appointments.      Che Teixeira MD

## 2024-02-21 NOTE — ED NOTES
Prtt brought in by EMS after having seizure like activity witnessed by NH staff. NH could not find her DNR papers. Pt was paced by EMS( see ems report for indepth details) Upon arrival we switched her to our monitor paced. We paused and pt was at a rate of 60. We stopped pacing. Shortly afterpatient arrived we contacted facility who found papers and faxed to us. Pt is aDNRCCA. Pt is on our cardiac monitor at this time waiting for results.      Vanessa Hightower RN  02/20/24 3886

## 2024-02-21 NOTE — ED NOTES
[facility] left her phone number and is leaving facility at this time. Family is still at bedside. [phone]     Vanessa Hightower RN  02/20/24 1709

## 2024-02-21 NOTE — ED PROVIDER NOTES
HPI  Patient is a 89-year-old female who is DNR CCA with a PMH of CAD, RA, HLD, HTN, hypothyroidism, MDD, and Alzheimer disease presenting to the emergency department due to concerns for seizure.  Patient's reportedly receives her Naprosyn for pain and began to seize.  Upon EMS arrival they noticed that her radial pulse was not palpable and her heart rate was anywhere from 20-25.  They began transcutaneous pacing with slight improvement in mentation.  Unable to obtain further history from the patient at this time.    Physical Exam  VITALS: Vital signs reviewed in nursing triage note, EMR flow sheets, and at patient's bedside  CONSTITUTIONAL: Appears to be in moderate distress, being externally paced  HEAD: NCAT  EYES: Pinpoint pupils bilaterally  NECK: Supple, non-tender, full ROM  CARD: Bradycardic upon arrival with irregular rhythm, no m/r/g, no JVD  RESP: LCTAB, no increased work of breathing, no use of accessory respiratory muscles  ABD: Bowel sounds present, non-distended, soft and non-tender, no palpable organomegaly, no masses, no guarding or rebound tenderness  BACK: No vertebral point or CVA tenderness, no obvious bony deformities, no spinal step-offs   EXT: Normal ROM in all four extremities, 2+ radial and DP pulses bilaterally, no edema  SKIN: Dry with appropriate turgor, no apparent rashes, suspicious lesions, or masses   NEURO: CNs II-XII grossly intact, AAOx0, sensation intact bilaterally, strength 5/5 on UEs and LEs bilaterally, speech is fluent and comprehensible  PSYCH: Unable to assess    Vitals:    02/20/24 2200   BP: 124/51   Pulse: 53   Resp: 15   Temp:    SpO2: (!) 93%       Assessment/Plan/MDM  Patient in critical condition upon arrival to the emergency department.  Initial heart rate ranged anywhere from 30-60.  Does appear to be appropriately transcutaneously paced, however will occasionally lose capture.  Blood pressure does appear to be normal at this time with transcutaneous pacing.   Given the patient had pinpoint pupils, and does appear to be on morphine, was provided with 0.4 mg of IV Narcan which did improve her symptoms and mentation.  At that time heart rate did appear to be over the set rate of 60 therefore pacer was paused demonstrating an underlying LBBB, although did have irregularity with it.  Heart rate continued to range anywhere from 50-90, however pressure did stay stable.  VBG was pulled due to concerns for possible hyperkalemia that demonstrated a metabolic acidosis.  Given the lack of seizure history and metabolic acidosis, concern she may have experienced cardiac arrest with then ROSC being obtained upon EMS' arrival and sustained with transcutaneous pacing; important to note that she was never defibrillated.  Electrolyte imbalance, ACS, and endocrine etiologies are considered such as severe hypothyroidism.  Family was attempted be contacted, however were unable to get a response until after some time.  They are currently on their way.  Further interventions to be contingent on the family's wishes given the patient's CODE STATUS, however if continues to be bradycardic would attempt atropine prior to initiation of transcutaneous pacing until can confirm with family what goals of care are.    Results  *See section(s) entitled ``Lab Results´´, ``Diagnostic Imaging Results Review´´ for entirety.  Notable results listed below  - Labs: I independently interpreted as pending at the time of signout  - Images: I independently interpreted as CXR shows no acute cardiopulmonary process    ED Course as of 02/20/24 2226   e Feb 20, 2024 2128 EKG done at 2114 interpreted by me shows sinus rhythm with a prolonged  ms with left axis deviation right bundle this is similar to previous EKG from November 30, 2023 no new ischemia. [RZ]   2222 Discussed with both son and daughter-in-law at the bedside about what to do if patient comes bradycardic in the send cardiac pacing.  They stated  that given her DNR CCA, they would like to forego cardiac pacing and only stick with medications such as atropine in an attempt to raise her heart rate.  They would like to prioritize comfort, and will continue with a DNR CCA CODE STATUS at this time. [JV]      ED Course User Index  [JV] Say Santos MD  [RZ] Terrell Weinstein MD         Diagnoses as of 02/20/24 2226   Bradycardia      Clinical Impression  Bradycardia    Dispo  Signed out to Dr. Weinstein at 2200; please see their note for full details regarding disposition.    Patient seen and discussed with attending physician Dr. Weinstein.    Say Santos MD  Emergency Medicine, PGY-3    Was dictated using Dragon dictation. Please excuse any errors found in the note.     Say Santos MD  Resident  02/20/24 2226

## 2024-02-21 NOTE — ED PROCEDURE NOTE
Procedure  Critical Care    Performed by: Terrell Weinstein MD  Authorized by: Brendan Biggs MD    Critical care provider statement:     Critical care time (minutes):  55    Critical care time was exclusive of:  Teaching time and separately billable procedures and treating other patients    Critical care was necessary to treat or prevent imminent or life-threatening deterioration of the following conditions:  Circulatory failure    Critical care was time spent personally by me on the following activities:  Development of treatment plan with patient or surrogate, ordering and review of laboratory studies, ordering and review of radiographic studies, pulse oximetry, re-evaluation of patient's condition, ordering and performing treatments and interventions, review of old charts and examination of patient               Terrell Weinstein MD  02/21/24 0020

## 2024-02-21 NOTE — CARE PLAN
The patient's goals for the shift include Go back to her facility.     The clinical goals for the shift include Patient will remain HDS throughout the shift.    Over the shift, the patient did not make progress toward the following goals. Barriers to progression include hospice. Recommendations to address these barriers include remain on hospice care, go back to facility.

## 2024-02-21 NOTE — PROGRESS NOTES
Pharmacy Medication History Review    Amena Chapa is a 89 y.o. female admitted for Bradycardia. Pharmacy reviewed the patient's [id number] medications and allergies for accuracy.    The list below reflects the updated PTA list. Comments regarding how patient may be taking medications differently can be found in the Admit Orders Activity  Prior to Admission Medications   Prescriptions Last Dose Informant Patient Reported?   DULoxetine (Cymbalta) 30 mg DR capsule  Other Yes   Sig: Take 1 capsule (30 mg) by mouth 2 times a day. Noon and bedtime Do not crush or chew.   LORazepam (Ativan) 0.5 mg tablet  Other Yes   Sig: Take 0.5 tablets (0.25 mg) by mouth every 4 hours if needed for anxiety (restlessness).   acetaminophen (Tylenol) 500 mg tablet  Other Yes   Sig: Take 1 tablet (500 mg) by mouth 3 times a day.   amLODIPine (Norvasc) 10 mg tablet  Other Yes   Sig: Take 1 tablet (10 mg) by mouth once daily. afternoon   aspirin 81 mg EC tablet  Other Yes   Sig: Take 1 tablet (81 mg) by mouth 2 times a day.   donepezil (Aricept) 10 mg tablet  Other Yes   Sig: Take 1 tablet (10 mg) by mouth once daily at bedtime.   famotidine (Pepcid) 10 mg tablet  Other Yes   Sig: Take 1 tablet (10 mg) by mouth once daily at bedtime.   gabapentin (Neurontin) 100 mg capsule  Other Yes   Sig: Take 1 capsule (100 mg) by mouth once daily at bedtime.   hydrocortisone 1 % cream  Other Yes   Sig: Apply 1 Application topically 2 times a day as needed.   latanoprost (Xalatan) 0.005 % ophthalmic solution  Other Yes   Sig: Administer 1 drop into both eyes once daily at bedtime.   levothyroxine (Synthroid, Levoxyl) 88 mcg tablet  Other Yes   Sig: Take 1 tablet (88 mcg) by mouth once daily in the morning. Take before meals.   lidocaine 4 % patch  Other Yes   Sig: Place 1 patch on the skin once daily. Remove & discard patch within 12 hours or as directed by MD.   loperamide (Imodium) 2 mg capsule  Other Yes   Sig: Take 1 capsule (2 mg) by  mouth 4 times a day as needed for diarrhea.   loratadine (Claritin) 10 mg tablet  Other Yes   Sig: Take 1 tablet (10 mg) by mouth once daily at bedtime.   magnesium hydroxide (Milk of Magnesia) 400 mg/5 mL suspension  Other Yes   Sig: Take 10 mL by mouth every other day if needed for constipation.   melatonin 5 mg tablet  Other Yes   Sig: Take 2 tablets (10 mg) by mouth once daily at bedtime.   morphine 20 mg/mL concentrated oral solution  Other Yes   Sig: Take 0.25 mL (5 mg) by mouth every 4 hours if needed for severe pain (7 - 10) or shortness of breath.   naproxen sodium (Flanax, naproxen,) 220 mg tablet  Other Yes   Sig: Take 1 tablet (220 mg) by mouth 2 times a day with meals.   omeprazole (PriLOSEC) 40 mg DR capsule  Other Yes   Sig: Take 1 capsule (40 mg) by mouth once daily. Afternoon Do not crush or chew.   ondansetron (Zofran) 4 mg tablet  Other Yes   Sig: Take 1 tablet (4 mg) by mouth 2 times a day as needed for nausea or vomiting.   polyethylene glycol (Glycolax, Miralax) 17 gram packet  Other Yes   Sig: Take 17 g by mouth every other day.   polyvinyl alcohol (Liquifilm Tears) 1.4 % ophthalmic solution  Other Yes   Sig: Administer 1 drop into both eyes every 8 hours if needed for dry eyes.   sennosides-docusate sodium (Autumn-Colace) 8.6-50 mg tablet  Other Yes   Sig: Take 1 tablet by mouth once daily as needed for constipation.   triamcinolone (Kenalog) 0.1 % cream  Other Yes   Sig: Apply 1 Application topically 2 times a day as needed. To the left ulnar forearm   vitamin A & D ointment  Other Yes   Sig: Apply 1 Application topically if needed for dry skin (autumn area/buttocks).      Facility-Administered Medications: None        The list below reflects the updated allergy list. Please review each documented allergy for additional clarification and justification.  Allergies  Reviewed by Marycarmen Renee, PharmD on 2/21/2024   No Known Allergies         Patient was unable to be assessed for M2B at  discharge. Pharmacy has been updated to N/A - patient is from facility.    Sources used to complete the med history include   Ron[facility] Physician's Orders report generated 2/20/24 9:08PM    Below are additional concerns with the patient's PTA list.  None    Marycarmen Renee, PharmD   Transitions of Care Pharmacist  [facility]  Please reach out via Secure Chat for questions, or if no response call [employer] or [facility]

## 2024-02-21 NOTE — ED NOTES
Son, Luis baron left. [phone]  Daughter in law Sera [phone]     Vanessa Hightower RN  02/21/24 0004

## 2024-02-24 LAB
ATRIAL RATE: 73 BPM
P OFFSET: 128 MS
P ONSET: 97 MS
PR INTERVAL: 210 MS
Q ONSET: 202 MS
QRS COUNT: 13 BEATS
QRS DURATION: 158 MS
QT INTERVAL: 454 MS
QTC CALCULATION(BAZETT): 500 MS
QTC FREDERICIA: 485 MS
R AXIS: -78 DEGREES
T AXIS: 87 DEGREES
T OFFSET: 429 MS
VENTRICULAR RATE: 73 BPM